# Patient Record
Sex: FEMALE | Race: WHITE | NOT HISPANIC OR LATINO | Employment: OTHER | URBAN - METROPOLITAN AREA
[De-identification: names, ages, dates, MRNs, and addresses within clinical notes are randomized per-mention and may not be internally consistent; named-entity substitution may affect disease eponyms.]

---

## 2022-09-02 ENCOUNTER — LAB (OUTPATIENT)
Dept: OTHER | Facility: HOSPITAL | Age: 62
End: 2022-09-02

## 2022-09-02 ENCOUNTER — CONSULT (OUTPATIENT)
Dept: ONCOLOGY | Facility: CLINIC | Age: 62
End: 2022-09-02

## 2022-09-02 VITALS
WEIGHT: 137.7 LBS | OXYGEN SATURATION: 95 % | BODY MASS INDEX: 27.03 KG/M2 | TEMPERATURE: 97.1 F | HEART RATE: 98 BPM | HEIGHT: 60 IN | DIASTOLIC BLOOD PRESSURE: 79 MMHG | SYSTOLIC BLOOD PRESSURE: 122 MMHG | RESPIRATION RATE: 16 BRPM

## 2022-09-02 DIAGNOSIS — D80.1 HYPOGAMMAGLOBULINEMIA, ACQUIRED: Primary | ICD-10-CM

## 2022-09-02 DIAGNOSIS — D64.9 ANEMIA, UNSPECIFIED TYPE: ICD-10-CM

## 2022-09-02 DIAGNOSIS — D64.9 ANEMIA, UNSPECIFIED TYPE: Primary | ICD-10-CM

## 2022-09-02 PROBLEM — R89.9 ABNORMAL LABORATORY TEST RESULT: Status: ACTIVE | Noted: 2022-09-02

## 2022-09-02 LAB
ALBUMIN SERPL-MCNC: 3.9 G/DL (ref 3.5–5.2)
ALBUMIN/GLOB SERPL: 1.3 G/DL
ALP SERPL-CCNC: 101 U/L (ref 39–117)
ALT SERPL W P-5'-P-CCNC: 19 U/L (ref 1–33)
ANION GAP SERPL CALCULATED.3IONS-SCNC: 8.8 MMOL/L (ref 5–15)
AST SERPL-CCNC: 28 U/L (ref 1–32)
BASOPHILS # BLD AUTO: 0.1 10*3/MM3 (ref 0–0.2)
BASOPHILS NFR BLD AUTO: 0.7 % (ref 0–1.5)
BILIRUB SERPL-MCNC: <0.2 MG/DL (ref 0–1.2)
BUN SERPL-MCNC: 15 MG/DL (ref 8–23)
BUN/CREAT SERPL: 22.4 (ref 7–25)
CALCIUM SPEC-SCNC: 9.3 MG/DL (ref 8.6–10.5)
CHLORIDE SERPL-SCNC: 94 MMOL/L (ref 98–107)
CHROMATIN AB SERPL-ACNC: <10 IU/ML (ref 0–14)
CO2 SERPL-SCNC: 32.2 MMOL/L (ref 22–29)
CREAT SERPL-MCNC: 0.67 MG/DL (ref 0.57–1)
CRP SERPL-MCNC: 1.3 MG/DL (ref 0–0.5)
DEPRECATED RDW RBC AUTO: 36.2 FL (ref 37–54)
EGFRCR SERPLBLD CKD-EPI 2021: 98.4 ML/MIN/1.73
EOSINOPHIL # BLD AUTO: 0.4 10*3/MM3 (ref 0–0.4)
EOSINOPHIL NFR BLD AUTO: 2.7 % (ref 0.3–6.2)
ERYTHROCYTE [DISTWIDTH] IN BLOOD BY AUTOMATED COUNT: 11.4 % (ref 12.3–15.4)
ERYTHROCYTE [SEDIMENTATION RATE] IN BLOOD: 32 MM/HR (ref 0–30)
FERRITIN SERPL-MCNC: 54.5 NG/ML (ref 13–150)
FOLATE SERPL-MCNC: >20 NG/ML (ref 4.78–24.2)
GLOBULIN UR ELPH-MCNC: 3.1 GM/DL
GLUCOSE SERPL-MCNC: 103 MG/DL (ref 65–99)
HCT VFR BLD AUTO: 33.3 % (ref 34–46.6)
HGB BLD-MCNC: 11.4 G/DL (ref 12–15.9)
HGB RETIC QN AUTO: 29.7 PG (ref 29.8–36.1)
IMM GRANULOCYTES # BLD AUTO: 0.08 10*3/MM3 (ref 0–0.05)
IMM GRANULOCYTES NFR BLD AUTO: 0.5 % (ref 0–0.5)
IMM RETICS NFR: 20.8 % (ref 3–15.8)
IRON 24H UR-MRATE: 170 MCG/DL (ref 37–145)
IRON SATN MFR SERPL: 44 % (ref 20–50)
LDH SERPL-CCNC: 202 U/L (ref 135–214)
LYMPHOCYTES # BLD AUTO: 2.29 10*3/MM3 (ref 0.7–3.1)
LYMPHOCYTES NFR BLD AUTO: 15.5 % (ref 19.6–45.3)
MCH RBC QN AUTO: 29.5 PG (ref 26.6–33)
MCHC RBC AUTO-ENTMCNC: 34.2 G/DL (ref 31.5–35.7)
MCV RBC AUTO: 86.3 FL (ref 79–97)
MONOCYTES # BLD AUTO: 0.8 10*3/MM3 (ref 0.1–0.9)
MONOCYTES NFR BLD AUTO: 5.4 % (ref 5–12)
NEUTROPHILS NFR BLD AUTO: 11.1 10*3/MM3 (ref 1.7–7)
NEUTROPHILS NFR BLD AUTO: 75.2 % (ref 42.7–76)
NRBC BLD AUTO-RTO: 0 /100 WBC (ref 0–0.2)
PLATELET # BLD AUTO: 434 10*3/MM3 (ref 140–450)
PMV BLD AUTO: 10.1 FL (ref 6–12)
POTASSIUM SERPL-SCNC: 3.7 MMOL/L (ref 3.5–5.2)
PROT SERPL-MCNC: 7 G/DL (ref 6–8.5)
RBC # BLD AUTO: 3.86 10*6/MM3 (ref 3.77–5.28)
RETICS # AUTO: 0.07 10*6/MM3 (ref 0.02–0.13)
RETICS/RBC NFR AUTO: 1.77 % (ref 0.7–1.9)
SODIUM SERPL-SCNC: 135 MMOL/L (ref 136–145)
TIBC SERPL-MCNC: 389 MCG/DL (ref 298–536)
TRANSFERRIN SERPL-MCNC: 261 MG/DL (ref 200–360)
URATE SERPL-MCNC: 5.2 MG/DL (ref 2.4–5.7)
VIT B12 BLD-MCNC: 1931 PG/ML (ref 211–946)
WBC NRBC COR # BLD: 14.77 10*3/MM3 (ref 3.4–10.8)

## 2022-09-02 PROCEDURE — 83521 IG LIGHT CHAINS FREE EACH: CPT | Performed by: INTERNAL MEDICINE

## 2022-09-02 PROCEDURE — 82607 VITAMIN B-12: CPT | Performed by: INTERNAL MEDICINE

## 2022-09-02 PROCEDURE — 86038 ANTINUCLEAR ANTIBODIES: CPT | Performed by: INTERNAL MEDICINE

## 2022-09-02 PROCEDURE — 82728 ASSAY OF FERRITIN: CPT | Performed by: INTERNAL MEDICINE

## 2022-09-02 PROCEDURE — 36415 COLL VENOUS BLD VENIPUNCTURE: CPT

## 2022-09-02 PROCEDURE — 82784 ASSAY IGA/IGD/IGG/IGM EACH: CPT | Performed by: INTERNAL MEDICINE

## 2022-09-02 PROCEDURE — 80053 COMPREHEN METABOLIC PANEL: CPT | Performed by: INTERNAL MEDICINE

## 2022-09-02 PROCEDURE — 85025 COMPLETE CBC W/AUTO DIFF WBC: CPT | Performed by: INTERNAL MEDICINE

## 2022-09-02 PROCEDURE — 82746 ASSAY OF FOLIC ACID SERUM: CPT | Performed by: INTERNAL MEDICINE

## 2022-09-02 PROCEDURE — 84165 PROTEIN E-PHORESIS SERUM: CPT | Performed by: INTERNAL MEDICINE

## 2022-09-02 PROCEDURE — 86334 IMMUNOFIX E-PHORESIS SERUM: CPT | Performed by: INTERNAL MEDICINE

## 2022-09-02 PROCEDURE — 86140 C-REACTIVE PROTEIN: CPT | Performed by: INTERNAL MEDICINE

## 2022-09-02 PROCEDURE — 84550 ASSAY OF BLOOD/URIC ACID: CPT | Performed by: INTERNAL MEDICINE

## 2022-09-02 PROCEDURE — 85652 RBC SED RATE AUTOMATED: CPT | Performed by: INTERNAL MEDICINE

## 2022-09-02 PROCEDURE — 99204 OFFICE O/P NEW MOD 45 MIN: CPT | Performed by: INTERNAL MEDICINE

## 2022-09-02 PROCEDURE — 85046 RETICYTE/HGB CONCENTRATE: CPT | Performed by: INTERNAL MEDICINE

## 2022-09-02 PROCEDURE — 83615 LACTATE (LD) (LDH) ENZYME: CPT | Performed by: INTERNAL MEDICINE

## 2022-09-02 PROCEDURE — 86431 RHEUMATOID FACTOR QUANT: CPT | Performed by: INTERNAL MEDICINE

## 2022-09-02 PROCEDURE — 84466 ASSAY OF TRANSFERRIN: CPT | Performed by: INTERNAL MEDICINE

## 2022-09-02 PROCEDURE — 83540 ASSAY OF IRON: CPT | Performed by: INTERNAL MEDICINE

## 2022-09-02 RX ORDER — CELECOXIB 200 MG/1
2 CAPSULE ORAL 2 TIMES DAILY
COMMUNITY
Start: 2022-08-30

## 2022-09-02 RX ORDER — POTASSIUM CHLORIDE 20 MEQ/1
1 TABLET, EXTENDED RELEASE ORAL 2 TIMES DAILY
COMMUNITY
Start: 2022-08-30

## 2022-09-02 RX ORDER — DULOXETIN HYDROCHLORIDE 30 MG/1
CAPSULE, DELAYED RELEASE ORAL
COMMUNITY
Start: 2012-05-30 | End: 2022-12-02

## 2022-09-02 RX ORDER — DOXYCYCLINE HYCLATE 50 MG/1
324 CAPSULE, GELATIN COATED ORAL DAILY
COMMUNITY
Start: 2022-05-18

## 2022-09-02 RX ORDER — DULOXETIN HYDROCHLORIDE 60 MG/1
1 CAPSULE, DELAYED RELEASE ORAL 2 TIMES DAILY
COMMUNITY
Start: 2022-06-28

## 2022-09-02 RX ORDER — FUROSEMIDE 40 MG/1
1 TABLET ORAL 2 TIMES DAILY
COMMUNITY
Start: 2022-06-28

## 2022-09-02 RX ORDER — DOXEPIN HYDROCHLORIDE 25 MG/1
1 CAPSULE ORAL NIGHTLY
COMMUNITY
Start: 2022-06-22

## 2022-09-02 RX ORDER — SACCHAROMYCES BOULARDII 250 MG
250 CAPSULE ORAL
COMMUNITY
Start: 2022-08-15

## 2022-09-02 RX ORDER — ARIPIPRAZOLE 5 MG/1
TABLET ORAL
COMMUNITY
Start: 2022-08-09

## 2022-09-02 RX ORDER — FERROUS SULFATE 325(65) MG
325 TABLET ORAL
Qty: 60 TABLET | Refills: 3 | Status: SHIPPED | OUTPATIENT
Start: 2022-09-02 | End: 2022-12-02

## 2022-09-02 RX ORDER — LEVOTHYROXINE SODIUM 25 UG/1
CAPSULE ORAL
COMMUNITY
Start: 2022-08-06

## 2022-09-02 RX ORDER — OXYCODONE HYDROCHLORIDE 30 MG/1
TABLET ORAL
COMMUNITY
End: 2022-12-02

## 2022-09-02 RX ORDER — DIPHENOXYLATE HYDROCHLORIDE AND ATROPINE SULFATE 2.5; .025 MG/1; MG/1
TABLET ORAL
COMMUNITY

## 2022-09-02 RX ORDER — FERROUS SULFATE 325(65) MG
325 TABLET ORAL
Qty: 30 TABLET | Refills: 3 | Status: SHIPPED | OUTPATIENT
Start: 2022-09-02 | End: 2022-09-02

## 2022-09-02 RX ORDER — ONDANSETRON 4 MG/1
TABLET, FILM COATED ORAL
COMMUNITY
Start: 2022-08-25

## 2022-09-02 RX ORDER — OXYCODONE AND ACETAMINOPHEN 10; 325 MG/1; MG/1
TABLET ORAL
COMMUNITY
Start: 2022-08-26

## 2022-09-02 NOTE — PROGRESS NOTES
Subjective     REASON FOR CONSULTATION:   Hypogammaglobulinemia   Iron deficiency anemia vs anemia of chronic disease  Aberrant T-cell population with absent CD7  Leukocytosis  Provide an opinion on any further workup or treatment                             REQUESTING PHYSICIAN: Contreras Fontanez MD    RECORDS OBTAINED:  Records of the patients history including those obtained from the referring provider were reviewed and summarized in detail.    HISTORY OF PRESENT ILLNESS:  The patient is a 61 y.o. year old female who is here for an opinion about the above issue.  She is referred to us from her allergy/immunology office due to a finding of hypogammaglobulinemia and iron deficiency.  She has been experiencing recurring episodes of bronchitis/pneumonia and is also being followed by Dr. Julian Funez of pulmonary medicine.    As part of her immunology work-up she had T and B cell immunophenotyping performed which showed no obvious monoclonal process but did show an aberrant T-cell population with absent CD7.  The pathologist commented that this could be seen with reactive or neoplastic processes.    She also was noted to be anemic with iron studies that were somewhat ambiguous with normal ferritin level but a decreased iron saturation and normal TIBC.  It is unclear at this time whether this is most consistent with iron deficiency or anemia of chronic disease.    Her blood count in the office today shows an elevated white count with a preponderance of mature neutrophils and normal absolute lymphocytes.  Her hemoglobin was slightly better at 11.4 g/dL compared to a hemoglobin of 10.5 with elevated platelets of 563,000 on her labs from 8/15/2022.  Platelet count today is within normal limits at 434,000.      She tells me that she has been taking an iron supplement once daily which may explain the improvement in her hemoglobin and platelet count.    She tells me that she has had chronic back problems and has had 19 separate  back related surgeries.  After the most recent surgery she coded and had to be resuscitated.  She reports that most of her health issues have developed since that episode.  History of Present Illness     Past Medical History:   Diagnosis Date   • Bone infection (HCC)    • DDD (degenerative disc disease), lumbar    • Depression    • Disease of thyroid gland    • H/O transfusion of packed red blood cells    • Hypertension    • Neuropathy         Past Surgical History:   Procedure Laterality Date   • APPENDECTOMY     • BREAST SURGERY     • BUNIONECTOMY  2017   • CATARACT EXTRACTION Bilateral 2014   • CHOLECYSTECTOMY     • COLONOSCOPY  2011   • DILATATION AND CURETTAGE     • EYE SURGERY     • HAMMER TOE REPAIR     • LUMBAR SPINE SURGERY     • NECK SURGERY  2021    decompression fusion C3-C4, C6-C7   • THYROID SURGERY     • TUBAL ABDOMINAL LIGATION          Current Outpatient Medications on File Prior to Visit   Medication Sig Dispense Refill   • ARIPiprazole (ABILIFY) 5 MG tablet TAKE 1 TABLET BY MOUTH DAILY IN THE MORNING     • calcium citrate-vitamin d (CALCITRATE) 315-250 MG-UNIT tablet tablet Take  by mouth.     • celecoxib (CeleBREX) 200 MG capsule Take 2 capsules by mouth 2 (Two) Times a Day.     • cyclobenzaprine (FLEXERIL) 10 MG tablet Take 10 mg by mouth 3 (Three) Times a Day As Needed for Muscle Spasms.     • doxepin (SINEquan) 25 MG capsule Take 1 capsule by mouth Every Night.     • DULoxetine (CYMBALTA) 30 MG capsule Take  by mouth.     • DULoxetine (CYMBALTA) 60 MG capsule Take  by mouth.     • DULoxetine (CYMBALTA) 60 MG capsule Take 1 capsule by mouth 2 (Two) Times a Day.     • ferrous gluconate (FERGON) 324 MG tablet Take 324 mg by mouth Daily.     • furosemide (LASIX) 20 MG tablet Take 20 mg by mouth 2 (Two) Times a Day.     • furosemide (LASIX) 40 MG tablet Take 1 tablet by mouth 2 (Two) Times a Day.     • gabapentin (NEURONTIN) 800 MG tablet Take  by mouth.     • hydrochlorothiazide (HYDRODIURIL) 25  MG tablet Take 25 mg by mouth Daily.     • levothyroxine sodium (TIROSINT) 137 MCG capsule Take 137 mcg by mouth Daily.     • liothyronine (CYTOMEL) 5 MCG tablet Take 5 mcg by mouth Daily.     • methadone (DOLOPHINE) 10 MG tablet Take 10 mg by mouth Every 6 (Six) Hours As Needed for Moderate Pain ,     • multivitamin (THERAGRAN) tablet tablet Take  by mouth.     • ondansetron (ZOFRAN) 4 MG tablet TAKE 1 TABLET BY MOUTH EVERY 12 HOURS AS NEEDED FOR NAUSEA     • oxyCODONE (ROXICODONE) 30 MG immediate release tablet Take  by mouth.     • oxyCODONE-acetaminophen (PERCOCET)  MG per tablet      • potassium chloride (K-DUR,KLOR-CON) 20 MEQ CR tablet Take 1 tablet by mouth 2 (Two) Times a Day.     • potassium chloride (KLOR-CON) 20 MEQ packet Take 20 mEq by mouth 2 (Two) Times a Day.     • saccharomyces boulardii (FLORASTOR) 250 MG capsule Take 250 mg by mouth.     • Tirosint 25 MCG capsule      • Vortioxetine HBr (TRINTELLIX PO) Take  by mouth.     • zolpidem (AMBIEN) 10 MG tablet Take 10 mg by mouth At Night As Needed for Sleep.       No current facility-administered medications on file prior to visit.        ALLERGIES:    Allergies   Allergen Reactions   • Diclofenac Swelling   • Adhesive Tape Rash     blisters   • Baclofen Other (See Comments)     Makes me crazy, jitters when mixed with other pain medications    • Meperidine Hives        Social History     Socioeconomic History   • Marital status:      Spouse name: J Luis   Tobacco Use   • Smoking status: Never Smoker   • Smokeless tobacco: Never Used   Substance and Sexual Activity   • Alcohol use: No   • Drug use: No   • Sexual activity: Defer        Family History   Problem Relation Age of Onset   • Hypertension Mother    • Heart disease Mother    • Heart failure Mother    • Stroke Father    • No Known Problems Sister    • Heart failure Brother    • No Known Problems Daughter    • No Known Problems Son    • Breast cancer Maternal Grandmother    • No  "Known Problems Maternal Grandfather    • No Known Problems Paternal Grandmother    • Breast cancer Paternal Grandfather    • No Known Problems Cousin         Review of Systems   Constitutional: Positive for activity change and fatigue. Negative for chills and fever.   HENT: Negative for mouth sores, trouble swallowing and voice change.    Eyes: Negative for pain and visual disturbance.   Respiratory: Negative for cough, shortness of breath and wheezing.    Cardiovascular: Negative for chest pain and palpitations.   Gastrointestinal: Positive for nausea. Negative for abdominal pain, constipation, diarrhea and vomiting.        She tells me she was recently diagnosed with gastroparesis   Genitourinary: Negative for difficulty urinating, frequency and urgency.   Musculoskeletal: Positive for arthralgias, back pain, gait problem, joint swelling and neck stiffness.   Skin: Negative for rash.   Neurological: Negative for dizziness, seizures, weakness and headaches.   Hematological: Negative for adenopathy. Does not bruise/bleed easily.   Psychiatric/Behavioral: Negative for behavioral problems and confusion. The patient is not nervous/anxious.         Objective     Vitals:    09/02/22 1038   BP: 122/79   Pulse: 98   Resp: 16   Temp: 97.1 °F (36.2 °C)   TempSrc: Temporal   SpO2: 95%   Weight: 62.5 kg (137 lb 11.2 oz)   Height: 152.4 cm (60\")   PainSc:   6   PainLoc: Back  Comment: back/ neck/ knee/ abdomen PAIN     Current Status 9/2/2022   ECOG score 0       Physical Exam  Constitutional:       General: She is not in acute distress.     Appearance: She is well-developed.   HENT:      Head: Normocephalic.   Eyes:      General: No scleral icterus.     Conjunctiva/sclera: Conjunctivae normal.      Pupils: Pupils are equal, round, and reactive to light.   Neck:      Thyroid: No thyromegaly.      Vascular: No JVD.   Cardiovascular:      Rate and Rhythm: Normal rate and regular rhythm.      Heart sounds: No murmur heard.    No " friction rub. No gallop.   Pulmonary:      Effort: Pulmonary effort is normal.      Breath sounds: Normal breath sounds. No wheezing or rales.   Abdominal:      General: There is no distension.      Palpations: Abdomen is soft. There is no mass.      Tenderness: There is no abdominal tenderness.   Musculoskeletal:         General: Swelling and deformity present. Normal range of motion.      Cervical back: Normal range of motion and neck supple.      Comments: She has visible swelling and redness of the metacarpal joints bilaterally.  She is chronically stooped over due to her severe back problems.  She is ambulating without a cane or walker.   Lymphadenopathy:      Cervical: No cervical adenopathy.   Skin:     General: Skin is warm and dry.      Findings: No erythema or rash.   Neurological:      Mental Status: She is alert and oriented to person, place, and time.      Cranial Nerves: No cranial nerve deficit.      Deep Tendon Reflexes: Reflexes are normal and symmetric.   Psychiatric:         Behavior: Behavior normal.         Judgment: Judgment normal.           RECENT LABS:  Hematology WBC   Date Value Ref Range Status   09/02/2022 14.77 (H) 3.40 - 10.80 10*3/mm3 Final   05/27/2021 5.04 4.5 - 11.0 10*3/uL Final     RBC   Date Value Ref Range Status   09/02/2022 3.86 3.77 - 5.28 10*6/mm3 Final   05/27/2021 4.10 4.0 - 5.2 10*6/uL Final     Hemoglobin   Date Value Ref Range Status   09/02/2022 11.4 (L) 12.0 - 15.9 g/dL Final   05/27/2021 12.5 12.0 - 16.0 g/dL Final     Hematocrit   Date Value Ref Range Status   09/02/2022 33.3 (L) 34.0 - 46.6 % Final   05/27/2021 34.2 (L) 36.0 - 46.0 % Final     Platelets   Date Value Ref Range Status   09/02/2022 434 140 - 450 10*3/mm3 Final   05/27/2021 358 140 - 440 10*3/uL Final        Iron   37 - 147 ug/dL 45    Unsaturated Binding Capacity   112 - 347 ug/dL 319    TIBC   265 - 497 ug/dL 364    SAT  12%    CT SCAN OF THE CHEST WITHOUT CONTRAST  7/7/2022  CONCLUSION:     1.  Worsening subtotal middle lobe atelectasis.   2. Development of mild multifocal bilateral lower lobe reticulonodular pulmonary infiltrate.   3. Small but increasing pericardial thickening/effusion.   4. Chronic changes of the chest are stable including the right upper lobe lung nodule.   5. Postoperative thoracic spine.       Assessment & Plan   1.  Hypogammaglobulinemia with recurring pulmonary infections.  2.  Anemia with somewhat ambiguous iron studies.  Her hemoglobin does seem to be improving with once daily iron supplementation.  3.  Aberrant population of CD7 negative T cells noted on T and B cell analysis.  There is no evidence of monoclonal B-cell population.    Recommendations  1.  I reassured the patient that I suspect this is a benign reactive process but we will pursue additional lab studies to shed further light on it.  2.  Additional labs will be drawn from the office today including repeat iron panel and ferritin, reticulocyte count, serum protein electrophoresis with immunofixation and free serum light chains, serum chemistries with LDH and uric acid, TONG and rheumatoid factor.  We also will check erythrocyte sedimentation rate and C-reactive protein as markers of inflammation.  3.  We we will asked patient to increase her iron supplement to twice daily with ferrous sulfate 325 mg daily with breakfast and dinner.  4.  We will have the patient return to our office in 4 weeks for follow-up with repeat CBC at that time.    Thanks for allowing us to see this nice patient in consultation.

## 2022-09-06 LAB
ALBUMIN SERPL ELPH-MCNC: 3.5 G/DL (ref 2.9–4.4)
ALBUMIN/GLOB SERPL: 1.2 {RATIO} (ref 0.7–1.7)
ALPHA1 GLOB SERPL ELPH-MCNC: 0.4 G/DL (ref 0–0.4)
ALPHA2 GLOB SERPL ELPH-MCNC: 1 G/DL (ref 0.4–1)
ANA SER QL: POSITIVE
B-GLOBULIN SERPL ELPH-MCNC: 1 G/DL (ref 0.7–1.3)
GAMMA GLOB SERPL ELPH-MCNC: 0.7 G/DL (ref 0.4–1.8)
GLOBULIN SER-MCNC: 3.1 G/DL (ref 2.2–3.9)
IGA SERPL-MCNC: 226 MG/DL (ref 87–352)
IGG SERPL-MCNC: 636 MG/DL (ref 586–1602)
IGM SERPL-MCNC: 68 MG/DL (ref 26–217)
INTERPRETATION SERPL IEP-IMP: ABNORMAL
KAPPA LC FREE SER-MCNC: 20 MG/L (ref 3.3–19.4)
KAPPA LC FREE/LAMBDA FREE SER: 1.4 {RATIO} (ref 0.26–1.65)
LABORATORY COMMENT REPORT: ABNORMAL
LAMBDA LC FREE SERPL-MCNC: 14.3 MG/L (ref 5.7–26.3)
M PROTEIN SERPL ELPH-MCNC: ABNORMAL G/DL
PROT SERPL-MCNC: 6.6 G/DL (ref 6–8.5)

## 2022-09-30 ENCOUNTER — OFFICE VISIT (OUTPATIENT)
Dept: ONCOLOGY | Facility: CLINIC | Age: 62
End: 2022-09-30

## 2022-09-30 ENCOUNTER — LAB (OUTPATIENT)
Dept: OTHER | Facility: HOSPITAL | Age: 62
End: 2022-09-30

## 2022-09-30 VITALS
OXYGEN SATURATION: 98 % | RESPIRATION RATE: 20 BRPM | WEIGHT: 139 LBS | DIASTOLIC BLOOD PRESSURE: 88 MMHG | BODY MASS INDEX: 27.29 KG/M2 | HEART RATE: 101 BPM | SYSTOLIC BLOOD PRESSURE: 160 MMHG | TEMPERATURE: 98.4 F | HEIGHT: 60 IN

## 2022-09-30 DIAGNOSIS — D64.9 ANEMIA, UNSPECIFIED TYPE: ICD-10-CM

## 2022-09-30 DIAGNOSIS — D80.1 HYPOGAMMAGLOBULINEMIA, ACQUIRED: Primary | ICD-10-CM

## 2022-09-30 LAB
BASOPHILS # BLD AUTO: 0.05 10*3/MM3 (ref 0–0.2)
BASOPHILS NFR BLD AUTO: 0.3 % (ref 0–1.5)
DEPRECATED RDW RBC AUTO: 36.9 FL (ref 37–54)
EOSINOPHIL # BLD AUTO: 0.01 10*3/MM3 (ref 0–0.4)
EOSINOPHIL NFR BLD AUTO: 0.1 % (ref 0.3–6.2)
ERYTHROCYTE [DISTWIDTH] IN BLOOD BY AUTOMATED COUNT: 12.1 % (ref 12.3–15.4)
HCT VFR BLD AUTO: 37.1 % (ref 34–46.6)
HGB BLD-MCNC: 12.6 G/DL (ref 12–15.9)
IMM GRANULOCYTES # BLD AUTO: 0.15 10*3/MM3 (ref 0–0.05)
IMM GRANULOCYTES NFR BLD AUTO: 0.9 % (ref 0–0.5)
LYMPHOCYTES # BLD AUTO: 2.26 10*3/MM3 (ref 0.7–3.1)
LYMPHOCYTES NFR BLD AUTO: 13.3 % (ref 19.6–45.3)
MCH RBC QN AUTO: 28.6 PG (ref 26.6–33)
MCHC RBC AUTO-ENTMCNC: 34 G/DL (ref 31.5–35.7)
MCV RBC AUTO: 84.1 FL (ref 79–97)
MONOCYTES # BLD AUTO: 0.7 10*3/MM3 (ref 0.1–0.9)
MONOCYTES NFR BLD AUTO: 4.1 % (ref 5–12)
NEUTROPHILS NFR BLD AUTO: 13.88 10*3/MM3 (ref 1.7–7)
NEUTROPHILS NFR BLD AUTO: 81.3 % (ref 42.7–76)
NRBC BLD AUTO-RTO: 0 /100 WBC (ref 0–0.2)
PLATELET # BLD AUTO: 486 10*3/MM3 (ref 140–450)
PMV BLD AUTO: 9.6 FL (ref 6–12)
RBC # BLD AUTO: 4.41 10*6/MM3 (ref 3.77–5.28)
WBC NRBC COR # BLD: 17.05 10*3/MM3 (ref 3.4–10.8)

## 2022-09-30 PROCEDURE — 36415 COLL VENOUS BLD VENIPUNCTURE: CPT

## 2022-09-30 PROCEDURE — 85025 COMPLETE CBC W/AUTO DIFF WBC: CPT | Performed by: INTERNAL MEDICINE

## 2022-09-30 PROCEDURE — 99213 OFFICE O/P EST LOW 20 MIN: CPT | Performed by: INTERNAL MEDICINE

## 2022-09-30 RX ORDER — CLONAZEPAM 0.5 MG/1
TABLET ORAL
COMMUNITY
Start: 2022-05-26

## 2022-09-30 RX ORDER — LEVOTHYROXINE SODIUM 13 UG/1
CAPSULE ORAL
COMMUNITY
Start: 2022-09-22

## 2022-09-30 NOTE — PROGRESS NOTES
Subjective     REASON FOR FOLLOW UP:   Hypogammaglobulinemia   Iron deficiency anemia vs anemia of chronic disease  Aberrant T-cell population with absent CD7  Leukocytosis    HISTORY OF PRESENT ILLNESS:  The patient is a 62 y.o. year old female who was referred to us from her allergy/immunology office due to a finding of hypogammaglobulinemia and iron deficiency.  She has been experiencing recurring episodes of bronchitis/pneumonia and is also being followed by Dr. Julian Funez of pulmonary medicine.    As part of her immunology work-up she had T and B cell immunophenotyping performed which showed no obvious monoclonal process but did show an aberrant T-cell population with absent CD7.  The pathologist commented that this could be seen with reactive or neoplastic processes.    She also was noted to be anemic with iron studies that were somewhat ambiguous with normal ferritin level but a decreased iron saturation and normal TIBC.  It is unclear at this time whether this is most consistent with iron deficiency or anemia of chronic disease.    Her blood count in the office today shows an elevated white count with a preponderance of mature neutrophils and normal absolute lymphocytes.  Her hemoglobin was slightly better at 11.4 g/dL compared to a hemoglobin of 10.5 with elevated platelets of 563,000 on her labs from 8/15/2022.  Platelet count today is within normal limits at 434,000.      She tells me that she has been taking an iron supplement once daily which may explain the improvement in her hemoglobin and platelet count.    She tells me that she has had chronic back problems and has had 19 separate back related surgeries.  After the most recent surgery she coded and had to be resuscitated.  She reports that most of her health issues have developed since that episode.    INTERVAL HISTORY:  After her initial consult visit of 9/2/2022 we asked her to increase her iron supplement to twice daily.  She also had  additional lab test performed with results as noted below.  She did have elevated markers of inflammation and a positive TONG.  We feel that her abnormal T-cell population is reactive and not malignant.    We will try to arrange referral to rheumatology as well as checking an TONG comprehensive panel.    History of Present Illness     Past Medical History:   Diagnosis Date   • Anemia    • Bone infection (HCC)    • DDD (degenerative disc disease), lumbar    • Depression    • Disease of thyroid gland    • H/O transfusion of packed red blood cells    • Hypertension    • Neuropathy         Past Surgical History:   Procedure Laterality Date   • APPENDECTOMY     • BREAST SURGERY     • BUNIONECTOMY  2017   • CATARACT EXTRACTION Bilateral 2014   • CHOLECYSTECTOMY     • COLONOSCOPY  2011   • DILATATION AND CURETTAGE     • EYE SURGERY     • HAMMER TOE REPAIR     • LUMBAR SPINE SURGERY     • NECK SURGERY  2021    decompression fusion C3-C4, C6-C7   • THYROID SURGERY     • TUBAL ABDOMINAL LIGATION          Current Outpatient Medications on File Prior to Visit   Medication Sig Dispense Refill   • ARIPiprazole (ABILIFY) 5 MG tablet TAKE 1 TABLET BY MOUTH DAILY IN THE MORNING     • celecoxib (CeleBREX) 200 MG capsule Take 2 capsules by mouth 2 (Two) Times a Day.     • clonazePAM (KlonoPIN) 0.5 MG tablet      • cyclobenzaprine (FLEXERIL) 10 MG tablet Take 10 mg by mouth 3 (Three) Times a Day As Needed for Muscle Spasms.     • doxepin (SINEquan) 25 MG capsule Take 1 capsule by mouth Every Night.     • DULoxetine (CYMBALTA) 60 MG capsule Take 1 capsule by mouth 2 (Two) Times a Day.     • ferrous gluconate (FERGON) 324 MG tablet Take 324 mg by mouth Daily.     • furosemide (LASIX) 40 MG tablet Take 1 tablet by mouth 2 (Two) Times a Day.     • gabapentin (NEURONTIN) 800 MG tablet Take  by mouth.     • hydrochlorothiazide (HYDRODIURIL) 25 MG tablet Take 25 mg by mouth Daily.     • liothyronine (CYTOMEL) 5 MCG tablet Take 5 mcg by mouth  "Daily.     • methadone (DOLOPHINE) 10 MG tablet Take 10 mg by mouth Every 6 (Six) Hours As Needed for Moderate Pain ,     • multivitamin (THERAGRAN) tablet tablet Take  by mouth.     • ondansetron (ZOFRAN) 4 MG tablet TAKE 1 TABLET BY MOUTH EVERY 12 HOURS AS NEEDED FOR NAUSEA     • oxyCODONE (ROXICODONE) 30 MG immediate release tablet Take  by mouth.     • oxyCODONE-acetaminophen (PERCOCET)  MG per tablet      • potassium chloride (K-DUR,KLOR-CON) 20 MEQ CR tablet Take 1 tablet by mouth 2 (Two) Times a Day.     • potassium chloride (KLOR-CON) 20 MEQ packet Take 20 mEq by mouth 2 (Two) Times a Day.     • saccharomyces boulardii (FLORASTOR) 250 MG capsule Take 250 mg by mouth.     • Tirosint 150 MCG capsule      • Vortioxetine HBr (TRINTELLIX PO) Take  by mouth.     • zolpidem (AMBIEN) 10 MG tablet Take 10 mg by mouth At Night As Needed for Sleep.     • calcium citrate-vitamin d (CALCITRATE) 315-250 MG-UNIT tablet tablet Take  by mouth.     • DULoxetine (CYMBALTA) 30 MG capsule Take  by mouth.     • DULoxetine (CYMBALTA) 60 MG capsule Take  by mouth.     • ferrous sulfate 325 (65 FE) MG tablet Take 1 tablet by mouth Daily With Breakfast & Dinner. 60 tablet 3   • furosemide (LASIX) 20 MG tablet Take 20 mg by mouth 2 (Two) Times a Day.     • levothyroxine sodium (TIROSINT) 137 MCG capsule Take 137 mcg by mouth Daily.     • Tirosint 25 MCG capsule        No current facility-administered medications on file prior to visit.        ALLERGIES:    Allergies   Allergen Reactions   • Diclofenac Swelling   • Losartan Other (See Comments)     \"GIVES ME THE JITTERS\"  \"GIVES ME THE JITTERS\"  <paragraph>\"GIVES ME THE JITTERS\"</paragraph>     • Other Unknown - Low Severity   • Adhesive Tape Rash     blisters   • Baclofen Other (See Comments)     Makes me crazy, jitters when mixed with other pain medications    • Meperidine Hives        Social History     Socioeconomic History   • Marital status:      Spouse name: J Luis " "  Tobacco Use   • Smoking status: Never Smoker   • Smokeless tobacco: Never Used   Substance and Sexual Activity   • Alcohol use: No   • Drug use: No   • Sexual activity: Defer        Family History   Problem Relation Age of Onset   • Hypertension Mother    • Heart disease Mother    • Heart failure Mother    • Stroke Father    • No Known Problems Sister    • Heart failure Brother    • No Known Problems Daughter    • No Known Problems Son    • Breast cancer Maternal Grandmother    • No Known Problems Maternal Grandfather    • No Known Problems Paternal Grandmother    • Breast cancer Paternal Grandfather    • No Known Problems Cousin         Review of Systems   Constitutional: Positive for activity change and fatigue. Negative for chills and fever.   HENT: Negative for mouth sores, trouble swallowing and voice change.    Eyes: Negative for pain and visual disturbance.   Respiratory: Negative for cough, shortness of breath and wheezing.    Cardiovascular: Negative for chest pain and palpitations.   Gastrointestinal: Positive for nausea. Negative for abdominal pain, constipation, diarrhea and vomiting.        She tells me she was recently diagnosed with gastroparesis   Genitourinary: Negative for difficulty urinating, frequency and urgency.   Musculoskeletal: Positive for arthralgias, back pain, gait problem, joint swelling and neck stiffness.   Skin: Negative for rash.   Neurological: Negative for dizziness, seizures, weakness and headaches.   Hematological: Negative for adenopathy. Does not bruise/bleed easily.   Psychiatric/Behavioral: Negative for behavioral problems and confusion. The patient is not nervous/anxious.         Objective     Vitals:    09/30/22 1515   BP: 160/88   Pulse: 101   Resp: 20   Temp: 98.4 °F (36.9 °C)   TempSrc: Temporal   SpO2: 98%   Weight: 63 kg (139 lb)  Comment: per patient   Height: 152.4 cm (60\")   PainSc:   7   PainLoc: Generalized  Comment: all over body pain, neck, back, belly "     Current Status 9/30/2022   ECOG score 1       Physical Exam  Constitutional:       General: She is not in acute distress.     Appearance: She is well-developed.   HENT:      Head: Normocephalic.   Eyes:      General: No scleral icterus.     Conjunctiva/sclera: Conjunctivae normal.      Pupils: Pupils are equal, round, and reactive to light.   Neck:      Thyroid: No thyromegaly.      Vascular: No JVD.   Cardiovascular:      Rate and Rhythm: Normal rate and regular rhythm.      Heart sounds: No murmur heard.    No friction rub. No gallop.   Pulmonary:      Effort: Pulmonary effort is normal.      Breath sounds: Normal breath sounds. No wheezing or rales.   Abdominal:      General: There is no distension.      Palpations: Abdomen is soft. There is no mass.      Tenderness: There is no abdominal tenderness.   Musculoskeletal:         General: Swelling and deformity present. Normal range of motion.      Cervical back: Normal range of motion and neck supple.      Comments: She has visible swelling and redness of the metacarpal joints bilaterally.  She is chronically stooped over due to her severe back problems.  She is ambulating without a cane or walker.   Lymphadenopathy:      Cervical: No cervical adenopathy.   Skin:     General: Skin is warm and dry.      Findings: No erythema or rash.   Neurological:      Mental Status: She is alert and oriented to person, place, and time.      Cranial Nerves: No cranial nerve deficit.      Deep Tendon Reflexes: Reflexes are normal and symmetric.   Psychiatric:         Behavior: Behavior normal.         Judgment: Judgment normal.           RECENT LABS:  Hematology WBC   Date Value Ref Range Status   09/30/2022 17.05 (H) 3.40 - 10.80 10*3/mm3 Final   05/27/2021 5.04 4.5 - 11.0 10*3/uL Final     RBC   Date Value Ref Range Status   09/30/2022 4.41 3.77 - 5.28 10*6/mm3 Final   05/27/2021 4.10 4.0 - 5.2 10*6/uL Final     Hemoglobin   Date Value Ref Range Status   09/30/2022 12.6 12.0  - 15.9 g/dL Final   05/27/2021 12.5 12.0 - 16.0 g/dL Final     Hematocrit   Date Value Ref Range Status   09/30/2022 37.1 34.0 - 46.6 % Final   05/27/2021 34.2 (L) 36.0 - 46.0 % Final     Platelets   Date Value Ref Range Status   09/30/2022 486 (H) 140 - 450 10*3/mm3 Final   05/27/2021 358 140 - 440 10*3/uL Final        Iron   37 - 147 ug/dL 45    Unsaturated Binding Capacity   112 - 347 ug/dL 319    TIBC   265 - 497 ug/dL 364    SAT  12%    CT SCAN OF THE CHEST WITHOUT CONTRAST  7/7/2022  CONCLUSION:     1. Worsening subtotal middle lobe atelectasis.   2. Development of mild multifocal bilateral lower lobe reticulonodular pulmonary infiltrate.   3. Small but increasing pericardial thickening/effusion.   4. Chronic changes of the chest are stable including the right upper lobe lung nodule.   5. Postoperative thoracic spine.       Assessment & Plan   1.  Hypogammaglobulinemia with recurring pulmonary infections.  2.  Anemia with somewhat ambiguous iron studies.  Her hemoglobin does seem to be improving with once daily iron supplementation.  Since increasing her iron supplement to twice daily she has had further improvement in her hemoglobin up to 12.6 today.  3.  Aberrant population of CD7 negative T cells noted on T and B cell analysis.  There is no evidence of monoclonal B-cell population.  We feel this is a reactive T-cell population and not indicative of an underlying T-cell leukemia or lymphoma.  4.  Positive TONG and elevated markers of inflammation.    Recommendations  1.  She will continue her iron supplement twice daily for now.  2.  We have asked her to return to our office in 8 weeks with labs drawn 1 week prior to that visit to include a repeat CBC iron panel and ferritin as well as a comprehensive TONG panel.  3.  We will try to arrange referral to rheumatology  4.  We recommended screening colonoscopy and she tells me that she is seeing a gastroenterologist in October.      Thanks for allowing us to see  this nice patient in consultation.

## 2022-10-19 ENCOUNTER — TELEPHONE (OUTPATIENT)
Dept: ONCOLOGY | Facility: CLINIC | Age: 62
End: 2022-10-19

## 2022-10-19 NOTE — TELEPHONE ENCOUNTER
Calling patient to check if [facility] has reached out to the patient, no answer, DEMETRIUS, requested a call back [phone]. Will provide their contact no. [phone] as we have faxed over referral last 10/4/22     [id number] As per patient she was seen yesterday and had labs drawn and imaging ordered

## 2022-10-28 ENCOUNTER — TRANSCRIBE ORDERS (OUTPATIENT)
Dept: ADMINISTRATIVE | Facility: HOSPITAL | Age: 62
End: 2022-10-28

## 2022-10-28 ENCOUNTER — HOSPITAL ENCOUNTER (OUTPATIENT)
Dept: GENERAL RADIOLOGY | Facility: HOSPITAL | Age: 62
Discharge: HOME | End: 2022-10-28
Admitting: INTERNAL MEDICINE

## 2022-10-28 DIAGNOSIS — M79.641 PAIN IN BOTH HANDS: ICD-10-CM

## 2022-10-28 DIAGNOSIS — M79.642 PAIN IN BOTH HANDS: ICD-10-CM

## 2022-10-28 DIAGNOSIS — D80.1 COMMON VARIABLE AGAMMAGLOBULINEMIA: ICD-10-CM

## 2022-10-28 DIAGNOSIS — D80.1 COMMON VARIABLE AGAMMAGLOBULINEMIA: Primary | ICD-10-CM

## 2022-10-28 DIAGNOSIS — M06.4 INFLAMMATORY POLYARTHROPATHY: ICD-10-CM

## 2022-10-28 DIAGNOSIS — R76.0 RAISED ANTIBODY TITER: ICD-10-CM

## 2022-10-28 PROCEDURE — 73130 X-RAY EXAM OF HAND: CPT

## 2022-11-02 ENCOUNTER — TELEPHONE (OUTPATIENT)
Dept: ONCOLOGY | Facility: CLINIC | Age: 62
End: 2022-11-02

## 2022-11-02 NOTE — TELEPHONE ENCOUNTER
Caller: Paola Reid    Relationship: Self    Best call back number:[phone]    Who are you requesting to speak with (clinical staff, provider,  specific staff member): CLINICAL    What was the call regarding: PATIENT RETURNING A MISSED CALL FOR KATHY    Do you require a callback: YES

## 2022-11-23 ENCOUNTER — APPOINTMENT (OUTPATIENT)
Dept: OTHER | Facility: HOSPITAL | Age: 62
End: 2022-11-23

## 2022-11-28 ENCOUNTER — LAB (OUTPATIENT)
Dept: OTHER | Facility: HOSPITAL | Age: 62
End: 2022-11-28

## 2022-11-28 DIAGNOSIS — D80.1 HYPOGAMMAGLOBULINEMIA, ACQUIRED: ICD-10-CM

## 2022-11-28 DIAGNOSIS — D64.9 ANEMIA, UNSPECIFIED TYPE: ICD-10-CM

## 2022-11-28 LAB
BASOPHILS # BLD AUTO: 0.08 10*3/MM3 (ref 0–0.2)
BASOPHILS NFR BLD AUTO: 0.7 % (ref 0–1.5)
DEPRECATED RDW RBC AUTO: 43 FL (ref 37–54)
EOSINOPHIL # BLD AUTO: 0.06 10*3/MM3 (ref 0–0.4)
EOSINOPHIL NFR BLD AUTO: 0.5 % (ref 0.3–6.2)
ERYTHROCYTE [DISTWIDTH] IN BLOOD BY AUTOMATED COUNT: 13.7 % (ref 12.3–15.4)
FERRITIN SERPL-MCNC: 39.9 NG/ML (ref 13–150)
HCT VFR BLD AUTO: 35.3 % (ref 34–46.6)
HGB BLD-MCNC: 11.7 G/DL (ref 12–15.9)
IMM GRANULOCYTES # BLD AUTO: 0.05 10*3/MM3 (ref 0–0.05)
IMM GRANULOCYTES NFR BLD AUTO: 0.4 % (ref 0–0.5)
IRON 24H UR-MRATE: 165 MCG/DL (ref 37–145)
IRON SATN MFR SERPL: 38 % (ref 20–50)
LYMPHOCYTES # BLD AUTO: 1.21 10*3/MM3 (ref 0.7–3.1)
LYMPHOCYTES NFR BLD AUTO: 10 % (ref 19.6–45.3)
MCH RBC QN AUTO: 28.6 PG (ref 26.6–33)
MCHC RBC AUTO-ENTMCNC: 33.1 G/DL (ref 31.5–35.7)
MCV RBC AUTO: 86.3 FL (ref 79–97)
MONOCYTES # BLD AUTO: 0.25 10*3/MM3 (ref 0.1–0.9)
MONOCYTES NFR BLD AUTO: 2.1 % (ref 5–12)
NEUTROPHILS NFR BLD AUTO: 10.43 10*3/MM3 (ref 1.7–7)
NEUTROPHILS NFR BLD AUTO: 86.3 % (ref 42.7–76)
NRBC BLD AUTO-RTO: 0 /100 WBC (ref 0–0.2)
PLATELET # BLD AUTO: 381 10*3/MM3 (ref 140–450)
PMV BLD AUTO: 9.2 FL (ref 6–12)
RBC # BLD AUTO: 4.09 10*6/MM3 (ref 3.77–5.28)
TIBC SERPL-MCNC: 437 MCG/DL (ref 298–536)
TRANSFERRIN SERPL-MCNC: 293 MG/DL (ref 200–360)
WBC NRBC COR # BLD: 12.08 10*3/MM3 (ref 3.4–10.8)

## 2022-11-28 PROCEDURE — 86225 DNA ANTIBODY NATIVE: CPT | Performed by: INTERNAL MEDICINE

## 2022-11-28 PROCEDURE — 86235 NUCLEAR ANTIGEN ANTIBODY: CPT | Performed by: INTERNAL MEDICINE

## 2022-11-28 PROCEDURE — 36415 COLL VENOUS BLD VENIPUNCTURE: CPT

## 2022-11-28 PROCEDURE — 85025 COMPLETE CBC W/AUTO DIFF WBC: CPT | Performed by: INTERNAL MEDICINE

## 2022-11-28 PROCEDURE — 82728 ASSAY OF FERRITIN: CPT | Performed by: INTERNAL MEDICINE

## 2022-11-28 PROCEDURE — 83540 ASSAY OF IRON: CPT | Performed by: INTERNAL MEDICINE

## 2022-11-28 PROCEDURE — 84466 ASSAY OF TRANSFERRIN: CPT | Performed by: INTERNAL MEDICINE

## 2022-11-29 LAB
CENTROMERE B AB SER-ACNC: <0.2 AI (ref 0–0.9)
CHROMATIN AB SERPL-ACNC: <0.2 AI (ref 0–0.9)
DSDNA AB SER-ACNC: <1 IU/ML (ref 0–9)
ENA JO1 AB SER-ACNC: <0.2 AI (ref 0–0.9)
ENA RNP AB SER-ACNC: <0.2 AI (ref 0–0.9)
ENA SCL70 AB SER-ACNC: <0.2 AI (ref 0–0.9)
ENA SM AB SER-ACNC: <0.2 AI (ref 0–0.9)
ENA SS-A AB SER-ACNC: 0.4 AI (ref 0–0.9)
ENA SS-B AB SER-ACNC: <0.2 AI (ref 0–0.9)
Lab: NORMAL

## 2022-12-02 ENCOUNTER — OFFICE VISIT (OUTPATIENT)
Dept: ONCOLOGY | Facility: CLINIC | Age: 62
End: 2022-12-02

## 2022-12-02 VITALS
HEIGHT: 60 IN | DIASTOLIC BLOOD PRESSURE: 74 MMHG | OXYGEN SATURATION: 98 % | BODY MASS INDEX: 28.07 KG/M2 | TEMPERATURE: 98.7 F | WEIGHT: 143 LBS | SYSTOLIC BLOOD PRESSURE: 118 MMHG | RESPIRATION RATE: 18 BRPM | HEART RATE: 101 BPM

## 2022-12-02 DIAGNOSIS — D80.1 HYPOGAMMAGLOBULINEMIA, ACQUIRED: ICD-10-CM

## 2022-12-02 DIAGNOSIS — D64.9 ANEMIA, UNSPECIFIED TYPE: Primary | ICD-10-CM

## 2022-12-02 PROCEDURE — 99213 OFFICE O/P EST LOW 20 MIN: CPT | Performed by: INTERNAL MEDICINE

## 2022-12-02 RX ORDER — GABAPENTIN 800 MG/1
1 TABLET ORAL 3 TIMES DAILY
COMMUNITY
Start: 2022-11-07

## 2022-12-02 RX ORDER — ESOMEPRAZOLE MAGNESIUM 40 MG/1
40 CAPSULE, DELAYED RELEASE ORAL DAILY
COMMUNITY
Start: 2022-11-05 | End: 2023-11-05

## 2022-12-02 RX ORDER — PREDNISONE 1 MG/1
15 TABLET ORAL EVERY MORNING
COMMUNITY
Start: 2022-11-16

## 2022-12-02 RX ORDER — FOLIC ACID 1 MG/1
1000 TABLET ORAL DAILY
COMMUNITY
Start: 2022-11-29 | End: 2022-12-02

## 2022-12-02 RX ORDER — LIFITEGRAST 50 MG/ML
SOLUTION/ DROPS OPHTHALMIC
COMMUNITY
Start: 2022-11-01

## 2022-12-02 NOTE — PROGRESS NOTES
Subjective     REASON FOR FOLLOW UP:   Hypogammaglobulinemia   Iron deficiency anemia vs anemia of chronic disease  Aberrant T-cell population with absent CD7  Leukocytosis    HISTORY OF PRESENT ILLNESS:  The patient is a 62 y.o. year old female who was referred to us from her allergy/immunology office due to a finding of hypogammaglobulinemia and iron deficiency.  She has been experiencing recurring episodes of bronchitis/pneumonia and is also being followed by Dr. Julian Funez of pulmonary medicine.    As part of her immunology work-up she had T and B cell immunophenotyping performed which showed no obvious monoclonal process but did show an aberrant T-cell population with absent CD7.  The pathologist commented that this could be seen with reactive or neoplastic processes.    She also was noted to be anemic with iron studies that were somewhat ambiguous with normal ferritin level but a decreased iron saturation and normal TIBC.  It is unclear at this time whether this is most consistent with iron deficiency or anemia of chronic disease.    Her blood count in the office today shows an elevated white count with a preponderance of mature neutrophils and normal absolute lymphocytes.  Her hemoglobin was slightly better at 11.4 g/dL compared to a hemoglobin of 10.5 with elevated platelets of 563,000 on her labs from 8/15/2022.  Platelet count today is within normal limits at 434,000.      She tells me that she has been taking an iron supplement once daily which may explain the improvement in her hemoglobin and platelet count.    She tells me that she has had chronic back problems and has had 19 separate back related surgeries.  After the most recent surgery she coded and had to be resuscitated.  She reports that most of her health issues have developed since that episode.    INTERVAL HISTORY:  Since her last visit here, she was evaluated by rheumatology and diagnosed with rheumatoid arthritis.  She initially was given  steroid medication but now is going to be taking weekly methotrexate under the direction of Dr. Ofelia Tinajero    Her hemoglobin remains stable at 11.7 g/dL and she seems to be replete with iron at this point.    She has been seen by gastroenterology and is scheduled for colonoscopy later this month.  History of Present Illness     Past Medical History:   Diagnosis Date   • Anemia    • Bone infection (HCC)    • DDD (degenerative disc disease), lumbar    • Depression    • Disease of thyroid gland    • H/O transfusion of packed red blood cells    • Hypertension    • Neuropathy         Past Surgical History:   Procedure Laterality Date   • APPENDECTOMY     • BREAST SURGERY     • BUNIONECTOMY  2017   • CATARACT EXTRACTION Bilateral 2014   • CHOLECYSTECTOMY     • COLONOSCOPY  2011   • DILATATION AND CURETTAGE     • EYE SURGERY     • HAMMER TOE REPAIR     • LUMBAR SPINE SURGERY     • NECK SURGERY  2021    decompression fusion C3-C4, C6-C7   • THYROID SURGERY     • TUBAL ABDOMINAL LIGATION          Current Outpatient Medications on File Prior to Visit   Medication Sig Dispense Refill   • esomeprazole (nexIUM) 40 MG capsule Take 40 mg by mouth Daily.     • gabapentin (NEURONTIN) 800 MG tablet Take 1 tablet by mouth 3 (Three) Times a Day.     • ARIPiprazole (ABILIFY) 5 MG tablet TAKE 1 TABLET BY MOUTH DAILY IN THE MORNING     • calcium citrate-vitamin d (CALCITRATE) 315-250 MG-UNIT tablet tablet Take  by mouth.     • celecoxib (CeleBREX) 200 MG capsule Take 2 capsules by mouth 2 (Two) Times a Day.     • clonazePAM (KlonoPIN) 0.5 MG tablet      • cyclobenzaprine (FLEXERIL) 10 MG tablet Take 10 mg by mouth 3 (Three) Times a Day As Needed for Muscle Spasms.     • doxepin (SINEquan) 25 MG capsule Take 1 capsule by mouth Every Night.     • DULoxetine (CYMBALTA) 30 MG capsule Take  by mouth.     • DULoxetine (CYMBALTA) 60 MG capsule Take  by mouth.     • DULoxetine (CYMBALTA) 60 MG capsule Take 1 capsule by mouth 2 (Two) Times a  Day.     • ferrous gluconate (FERGON) 324 MG tablet Take 324 mg by mouth Daily.     • ferrous sulfate 325 (65 FE) MG tablet Take 1 tablet by mouth Daily With Breakfast & Dinner. 60 tablet 3   • folic acid (FOLVITE) 1 MG tablet Take 1,000 mcg by mouth Daily.     • furosemide (LASIX) 20 MG tablet Take 20 mg by mouth 2 (Two) Times a Day.     • furosemide (LASIX) 40 MG tablet Take 1 tablet by mouth 2 (Two) Times a Day.     • gabapentin (NEURONTIN) 800 MG tablet Take  by mouth.     • hydrochlorothiazide (HYDRODIURIL) 25 MG tablet Take 25 mg by mouth Daily.     • levothyroxine sodium (TIROSINT) 137 MCG capsule Take 137 mcg by mouth Daily.     • liothyronine (CYTOMEL) 5 MCG tablet Take 5 mcg by mouth Daily.     • methadone (DOLOPHINE) 10 MG tablet Take 10 mg by mouth Every 6 (Six) Hours As Needed for Moderate Pain ,     • methotrexate 2.5 MG tablet Take 20 mg by mouth 1 (One) Time Per Week.     • multivitamin (THERAGRAN) tablet tablet Take  by mouth.     • ondansetron (ZOFRAN) 4 MG tablet TAKE 1 TABLET BY MOUTH EVERY 12 HOURS AS NEEDED FOR NAUSEA     • oxyCODONE (ROXICODONE) 30 MG immediate release tablet Take  by mouth.     • oxyCODONE-acetaminophen (PERCOCET)  MG per tablet      • potassium chloride (K-DUR,KLOR-CON) 20 MEQ CR tablet Take 1 tablet by mouth 2 (Two) Times a Day.     • potassium chloride (KLOR-CON) 20 MEQ packet Take 20 mEq by mouth 2 (Two) Times a Day.     • predniSONE (DELTASONE) 5 MG tablet Take 15 mg by mouth Every Morning.     • saccharomyces boulardii (FLORASTOR) 250 MG capsule Take 250 mg by mouth.     • Tirosint 150 MCG capsule      • Tirosint 25 MCG capsule      • Vortioxetine HBr (TRINTELLIX PO) Take  by mouth.     • Xiidra 5 % ophthalmic solution      • zolpidem (AMBIEN) 10 MG tablet Take 10 mg by mouth At Night As Needed for Sleep.       No current facility-administered medications on file prior to visit.        ALLERGIES:    Allergies   Allergen Reactions   • Diclofenac Swelling   •  "Losartan Other (See Comments)     \"GIVES ME THE JITTERS\"  \"GIVES ME THE JITTERS\"  <paragraph>\"GIVES ME THE JITTERS\"</paragraph>     • Other Unknown - Low Severity   • Adhesive Tape Rash     blisters   • Baclofen Other (See Comments)     Makes me crazy, jitters when mixed with other pain medications    • Meperidine Hives        Social History     Socioeconomic History   • Marital status:      Spouse name: J Luis   Tobacco Use   • Smoking status: Never   • Smokeless tobacco: Never   Substance and Sexual Activity   • Alcohol use: No   • Drug use: No   • Sexual activity: Defer        Family History   Problem Relation Age of Onset   • Hypertension Mother    • Heart disease Mother    • Heart failure Mother    • Stroke Father    • No Known Problems Sister    • Heart failure Brother    • No Known Problems Daughter    • No Known Problems Son    • Breast cancer Maternal Grandmother    • No Known Problems Maternal Grandfather    • No Known Problems Paternal Grandmother    • Breast cancer Paternal Grandfather    • No Known Problems Cousin         Review of Systems   Constitutional: Positive for activity change and fatigue. Negative for chills and fever.   HENT: Negative for mouth sores, trouble swallowing and voice change.    Eyes: Negative for pain and visual disturbance.   Respiratory: Negative for cough, shortness of breath and wheezing.    Cardiovascular: Negative for chest pain and palpitations.   Gastrointestinal: Positive for nausea. Negative for abdominal pain, constipation, diarrhea and vomiting.        She tells me she was recently diagnosed with gastroparesis   Genitourinary: Negative for difficulty urinating, frequency and urgency.   Musculoskeletal: Positive for arthralgias, back pain, gait problem, joint swelling and neck stiffness.   Skin: Negative for rash.   Neurological: Negative for dizziness, seizures, weakness and headaches.   Hematological: Negative for adenopathy. Does not bruise/bleed easily. " "  Psychiatric/Behavioral: Negative for behavioral problems and confusion. The patient is not nervous/anxious.         Objective     Vitals:    12/02/22 1546   Temp: 98.7 °F (37.1 °C)   TempSrc: Temporal   Weight: 64.9 kg (143 lb)  Comment: per patient   Height: 152 cm (59.84\")   PainSc:   7   PainLoc: Generalized  Comment: joint pain     Current Status 12/2/2022   ECOG score 0       Physical Exam  Constitutional:       General: She is not in acute distress.     Appearance: She is well-developed.   HENT:      Head: Normocephalic.   Eyes:      General: No scleral icterus.     Conjunctiva/sclera: Conjunctivae normal.      Pupils: Pupils are equal, round, and reactive to light.   Neck:      Thyroid: No thyromegaly.      Vascular: No JVD.   Cardiovascular:      Rate and Rhythm: Normal rate and regular rhythm.      Heart sounds: No murmur heard.    No friction rub. No gallop.   Pulmonary:      Effort: Pulmonary effort is normal.      Breath sounds: Normal breath sounds. No wheezing or rales.   Abdominal:      General: There is no distension.      Palpations: Abdomen is soft. There is no mass.      Tenderness: There is no abdominal tenderness.   Musculoskeletal:         General: Swelling and deformity present. Normal range of motion.      Cervical back: Normal range of motion and neck supple.      Comments: She has visible swelling and redness of the metacarpal joints bilaterally.  She is chronically stooped over due to her severe back problems.  She is ambulating without a cane or walker.   Lymphadenopathy:      Cervical: No cervical adenopathy.   Skin:     General: Skin is warm and dry.      Findings: No erythema or rash.   Neurological:      Mental Status: She is alert and oriented to person, place, and time.      Cranial Nerves: No cranial nerve deficit.      Deep Tendon Reflexes: Reflexes are normal and symmetric.   Psychiatric:         Behavior: Behavior normal.         Judgment: Judgment normal.           RECENT " LABS:  Hematology WBC   Date Value Ref Range Status   11/28/2022 12.08 (H) 3.40 - 10.80 10*3/mm3 Final   05/27/2021 5.04 4.5 - 11.0 10*3/uL Final     RBC   Date Value Ref Range Status   11/28/2022 4.09 3.77 - 5.28 10*6/mm3 Final   05/27/2021 4.10 4.0 - 5.2 10*6/uL Final     Hemoglobin   Date Value Ref Range Status   11/28/2022 11.7 (L) 12.0 - 15.9 g/dL Final   05/27/2021 12.5 12.0 - 16.0 g/dL Final     Hematocrit   Date Value Ref Range Status   11/28/2022 35.3 34.0 - 46.6 % Final   05/27/2021 34.2 (L) 36.0 - 46.0 % Final     Platelets   Date Value Ref Range Status   11/28/2022 381 140 - 450 10*3/mm3 Final   05/27/2021 358 140 - 440 10*3/uL Final        Lab Results   Component Value Date    GLUCOSE 103 (H) 09/02/2022    BUN 15 09/02/2022    CREATININE 0.67 09/02/2022    BCR 22.4 09/02/2022    K 3.7 09/02/2022    CO2 32.2 (H) 09/02/2022    CALCIUM 9.3 09/02/2022    PROTENTOTREF 6.6 09/02/2022    ALBUMIN 3.90 09/02/2022    ALBUMIN 3.5 09/02/2022    LABIL2 1.2 09/02/2022    AST 28 09/02/2022    ALT 19 09/02/2022     Lab Results   Component Value Date    IRON 165 (H) 11/28/2022    TIBC 437 11/28/2022    FERRITIN 39.90 11/28/2022   SAT 38%    9/2/2022  TONG Direct   Negative Positive Abnormal       Lab Results   Component Value Date    CRP 1.30 (H) 09/02/2022     Lab Results   Component Value Date    SEDRATE 32 (H) 09/02/2022       IMAGING:  CT SCAN OF THE CHEST WITHOUT CONTRAST  7/7/2022  CONCLUSION:     1. Worsening subtotal middle lobe atelectasis.   2. Development of mild multifocal bilateral lower lobe reticulonodular pulmonary infiltrate.   3. Small but increasing pericardial thickening/effusion.   4. Chronic changes of the chest are stable including the right upper lobe lung nodule.   5. Postoperative thoracic spine.       Assessment & Plan   1.  Hypogammaglobulinemia with recurring pulmonary infections.  2.  Anemia with somewhat ambiguous iron studies.  Her hemoglobin does seem to be improving with once daily iron  supplementation.  3.  Aberrant population of CD7 negative T cells noted on T and B cell analysis.  There is no evidence of monoclonal B-cell population.  We feel this is a reactive T-cell population and not indicative of an underlying T-cell leukemia or lymphoma.  4.  Positive TONG and elevated markers of inflammation.  Patient has been evaluated by rheumatology and will be following up with Dr. Ofelia Tinajero.    PLAN  1.  She will continue her iron supplement once daily for now.  2.    3.  We recommended screening colonoscopy and she currently is scheduled for colonoscopy 12/13/2022.

## 2023-04-02 DIAGNOSIS — D80.1 HYPOGAMMAGLOBULINEMIA, ACQUIRED: ICD-10-CM

## 2023-04-02 DIAGNOSIS — D64.9 ANEMIA, UNSPECIFIED TYPE: ICD-10-CM

## 2023-04-03 RX ORDER — FERROUS SULFATE 325(65) MG
TABLET ORAL
Qty: 30 TABLET | Refills: 1 | Status: SHIPPED | OUTPATIENT
Start: 2023-04-03

## 2023-06-07 DIAGNOSIS — D64.9 ANEMIA, UNSPECIFIED TYPE: ICD-10-CM

## 2023-06-07 DIAGNOSIS — D80.1 HYPOGAMMAGLOBULINEMIA, ACQUIRED: ICD-10-CM

## 2023-06-07 RX ORDER — FERROUS SULFATE 325(65) MG
TABLET ORAL
Qty: 30 TABLET | Refills: 1 | OUTPATIENT
Start: 2023-06-07

## 2023-06-13 ENCOUNTER — TELEPHONE (OUTPATIENT)
Dept: ONCOLOGY | Facility: CLINIC | Age: 63
End: 2023-06-13

## 2023-06-13 NOTE — TELEPHONE ENCOUNTER
Caller: Paola Reid    Relationship: Self    Best call back number: [phone]    What is the best time to reach you: ANYTIME    Who are you requesting to speak with (clinical staff, provider, specific staff member): SCHEDULING    What was the call regarding: PT NEEDS TO R/S HER MISSED LAB AND F/U APPT - HER FERROUS SULFATE WILL BE RUNNING OUT IN 3 DAYS AND THE REFILL REQUEST WAS DENIED DUE TO HER HAVING TO CANCEL HER APPT SHE SAID.    PLEASE CALL TO R/S APPT.     Is it okay if the provider responds through MyChart: NO

## 2023-06-14 ENCOUNTER — TELEPHONE (OUTPATIENT)
Dept: ONCOLOGY | Facility: CLINIC | Age: 63
End: 2023-06-14
Payer: MEDICARE

## 2023-06-14 RX ORDER — DOXYCYCLINE HYCLATE 50 MG/1
324 CAPSULE, GELATIN COATED ORAL DAILY
Qty: 30 TABLET | Refills: 0 | Status: SHIPPED | OUTPATIENT
Start: 2023-06-14

## 2023-06-14 RX ORDER — DOXYCYCLINE HYCLATE 50 MG/1
324 CAPSULE, GELATIN COATED ORAL DAILY
Qty: 90 TABLET | OUTPATIENT
Start: 2023-06-14

## 2023-06-14 NOTE — TELEPHONE ENCOUNTER
Caller: Paola Reid    Relationship to patient: Self    Best call back number: [phone]    Patient is needing: TO CHECK ON MEDICATION REFILL FOR FERROUS SULFATE. SHE STATES THE PHARMACY DID NOT REFILL BECAUSE PT HAS NOT SEEN DOCTOR, HOWEVER, PT WILL RUN OUT BEFORE SHE COMES TO SEE US ON 6-16-23.

## 2023-06-14 NOTE — TELEPHONE ENCOUNTER
Call back to Ms. Reid to let her know prescription for 7 days would be sent to get her through to her appointment, but patient states she has appointments the next 2 weeks, and has sent a message to scheduling to reschedule.  Let her know refill for 30 days will be sent with 0 refills.Patient verbalized understanding.

## 2023-06-14 NOTE — TELEPHONE ENCOUNTER
Caller: Paola Reid    Relationship to patient: Self    Best call back number: [phone]    Patient is needing: TO R/S 6-23-23 F/U APPT TO ANY OTHER DAY THE WEEK OF 6-26-23. SHE CANNOT DO 6-30-23 THOUGH.

## 2023-06-14 NOTE — TELEPHONE ENCOUNTER
CALLED AND LEFT MESSAGE FOR PT W/ APPT DATE AND TIME- TOLD HER TO CALL US IF THIS WAS NOT A GOOD DAY TIME.  PLEASE WT TO [facility]

## 2023-06-16 ENCOUNTER — LAB (OUTPATIENT)
Dept: OTHER | Facility: HOSPITAL | Age: 63
End: 2023-06-16
Payer: MEDICARE

## 2023-06-16 DIAGNOSIS — D64.9 ANEMIA, UNSPECIFIED TYPE: ICD-10-CM

## 2023-06-16 DIAGNOSIS — D80.1 HYPOGAMMAGLOBULINEMIA, ACQUIRED: ICD-10-CM

## 2023-06-16 LAB
ALBUMIN SERPL-MCNC: 4.3 G/DL (ref 3.5–5.2)
ALBUMIN/GLOB SERPL: 1.3 G/DL
ALP SERPL-CCNC: 106 U/L (ref 39–117)
ALT SERPL W P-5'-P-CCNC: 11 U/L (ref 1–33)
ANION GAP SERPL CALCULATED.3IONS-SCNC: 16.3 MMOL/L (ref 5–15)
AST SERPL-CCNC: 21 U/L (ref 1–32)
BASOPHILS # BLD AUTO: 0.1 10*3/MM3 (ref 0–0.2)
BASOPHILS NFR BLD AUTO: 1.2 % (ref 0–1.5)
BILIRUB SERPL-MCNC: 0.3 MG/DL (ref 0–1.2)
BUN SERPL-MCNC: 29 MG/DL (ref 8–23)
BUN/CREAT SERPL: 42.6 (ref 7–25)
CALCIUM SPEC-SCNC: 9.5 MG/DL (ref 8.6–10.5)
CHLORIDE SERPL-SCNC: 91 MMOL/L (ref 98–107)
CO2 SERPL-SCNC: 27.7 MMOL/L (ref 22–29)
CREAT SERPL-MCNC: 0.68 MG/DL (ref 0.57–1)
DEPRECATED RDW RBC AUTO: 38.4 FL (ref 37–54)
EGFRCR SERPLBLD CKD-EPI 2021: 97.4 ML/MIN/1.73
EOSINOPHIL # BLD AUTO: 0.18 10*3/MM3 (ref 0–0.4)
EOSINOPHIL NFR BLD AUTO: 2.1 % (ref 0.3–6.2)
ERYTHROCYTE [DISTWIDTH] IN BLOOD BY AUTOMATED COUNT: 12 % (ref 12.3–15.4)
FERRITIN SERPL-MCNC: 163.1 NG/ML (ref 13–150)
GLOBULIN UR ELPH-MCNC: 3.3 GM/DL
GLUCOSE SERPL-MCNC: 97 MG/DL (ref 65–99)
HCT VFR BLD AUTO: 34.1 % (ref 34–46.6)
HGB BLD-MCNC: 12.1 G/DL (ref 12–15.9)
IMM GRANULOCYTES # BLD AUTO: 0.05 10*3/MM3 (ref 0–0.05)
IMM GRANULOCYTES NFR BLD AUTO: 0.6 % (ref 0–0.5)
IRON 24H UR-MRATE: 100 MCG/DL (ref 37–145)
IRON SATN MFR SERPL: 26 % (ref 20–50)
LYMPHOCYTES # BLD AUTO: 2.05 10*3/MM3 (ref 0.7–3.1)
LYMPHOCYTES NFR BLD AUTO: 24.2 % (ref 19.6–45.3)
MCH RBC QN AUTO: 31.1 PG (ref 26.6–33)
MCHC RBC AUTO-ENTMCNC: 35.5 G/DL (ref 31.5–35.7)
MCV RBC AUTO: 87.7 FL (ref 79–97)
MONOCYTES # BLD AUTO: 0.41 10*3/MM3 (ref 0.1–0.9)
MONOCYTES NFR BLD AUTO: 4.8 % (ref 5–12)
NEUTROPHILS NFR BLD AUTO: 5.69 10*3/MM3 (ref 1.7–7)
NEUTROPHILS NFR BLD AUTO: 67.1 % (ref 42.7–76)
NRBC BLD AUTO-RTO: 0 /100 WBC (ref 0–0.2)
PLATELET # BLD AUTO: 510 10*3/MM3 (ref 140–450)
PMV BLD AUTO: 10.4 FL (ref 6–12)
POTASSIUM SERPL-SCNC: 3.6 MMOL/L (ref 3.5–5.2)
PROT SERPL-MCNC: 7.6 G/DL (ref 6–8.5)
RBC # BLD AUTO: 3.89 10*6/MM3 (ref 3.77–5.28)
SODIUM SERPL-SCNC: 135 MMOL/L (ref 136–145)
TIBC SERPL-MCNC: 378 MCG/DL (ref 298–536)
TRANSFERRIN SERPL-MCNC: 254 MG/DL (ref 200–360)
WBC NRBC COR # BLD: 8.48 10*3/MM3 (ref 3.4–10.8)

## 2023-06-16 PROCEDURE — 83521 IG LIGHT CHAINS FREE EACH: CPT | Performed by: INTERNAL MEDICINE

## 2023-06-16 PROCEDURE — 84165 PROTEIN E-PHORESIS SERUM: CPT | Performed by: INTERNAL MEDICINE

## 2023-06-16 PROCEDURE — 80053 COMPREHEN METABOLIC PANEL: CPT | Performed by: INTERNAL MEDICINE

## 2023-06-16 PROCEDURE — 86334 IMMUNOFIX E-PHORESIS SERUM: CPT | Performed by: INTERNAL MEDICINE

## 2023-06-16 PROCEDURE — 36415 COLL VENOUS BLD VENIPUNCTURE: CPT

## 2023-06-16 PROCEDURE — 85025 COMPLETE CBC W/AUTO DIFF WBC: CPT | Performed by: INTERNAL MEDICINE

## 2023-06-16 PROCEDURE — 83540 ASSAY OF IRON: CPT | Performed by: INTERNAL MEDICINE

## 2023-06-16 PROCEDURE — 82784 ASSAY IGA/IGD/IGG/IGM EACH: CPT | Performed by: INTERNAL MEDICINE

## 2023-06-16 PROCEDURE — 84466 ASSAY OF TRANSFERRIN: CPT | Performed by: INTERNAL MEDICINE

## 2023-06-16 PROCEDURE — 82728 ASSAY OF FERRITIN: CPT | Performed by: INTERNAL MEDICINE

## 2023-06-19 LAB
ALBUMIN SERPL ELPH-MCNC: 4.4 G/DL (ref 2.9–4.4)
ALBUMIN/GLOB SERPL: 1.5 {RATIO} (ref 0.7–1.7)
ALPHA1 GLOB SERPL ELPH-MCNC: 0.3 G/DL (ref 0–0.4)
ALPHA2 GLOB SERPL ELPH-MCNC: 0.9 G/DL (ref 0.4–1)
B-GLOBULIN SERPL ELPH-MCNC: 1.1 G/DL (ref 0.7–1.3)
GAMMA GLOB SERPL ELPH-MCNC: 0.8 G/DL (ref 0.4–1.8)
GLOBULIN SER-MCNC: 3.1 G/DL (ref 2.2–3.9)
IGA SERPL-MCNC: 207 MG/DL (ref 87–352)
IGG SERPL-MCNC: 766 MG/DL (ref 586–1602)
IGM SERPL-MCNC: 55 MG/DL (ref 26–217)
INTERPRETATION SERPL IEP-IMP: ABNORMAL
KAPPA LC FREE SER-MCNC: 22.9 MG/L (ref 3.3–19.4)
KAPPA LC FREE/LAMBDA FREE SER: 1.73 {RATIO} (ref 0.26–1.65)
LABORATORY COMMENT REPORT: ABNORMAL
LAMBDA LC FREE SERPL-MCNC: 13.2 MG/L (ref 5.7–26.3)
M PROTEIN SERPL ELPH-MCNC: ABNORMAL G/DL
PROT SERPL-MCNC: 7.5 G/DL (ref 6–8.5)

## 2023-06-27 PROBLEM — D64.9 ANEMIA: Status: ACTIVE | Noted: 2023-06-27

## 2023-06-28 PROBLEM — D80.1 HYPOGAMMAGLOBULINEMIA: Status: ACTIVE | Noted: 2023-06-28

## 2023-07-21 PROCEDURE — 85025 COMPLETE CBC W/AUTO DIFF WBC: CPT | Performed by: INTERNAL MEDICINE

## 2023-07-21 PROCEDURE — 82784 ASSAY IGA/IGD/IGG/IGM EACH: CPT | Performed by: INTERNAL MEDICINE

## 2023-07-26 RX ORDER — DOXYCYCLINE HYCLATE 50 MG/1
324 CAPSULE, GELATIN COATED ORAL DAILY
Qty: 30 TABLET | Refills: 2 | Status: SHIPPED | OUTPATIENT
Start: 2023-07-26

## 2023-08-03 DIAGNOSIS — D80.1 HYPOGAMMAGLOBULINEMIA: Primary | ICD-10-CM

## 2023-08-04 ENCOUNTER — INFUSION (OUTPATIENT)
Dept: ONCOLOGY | Facility: HOSPITAL | Age: 63
End: 2023-08-04
Payer: MEDICARE

## 2023-08-04 VITALS
BODY MASS INDEX: 29.25 KG/M2 | TEMPERATURE: 97.1 F | DIASTOLIC BLOOD PRESSURE: 79 MMHG | HEART RATE: 73 BPM | SYSTOLIC BLOOD PRESSURE: 123 MMHG | OXYGEN SATURATION: 98 % | WEIGHT: 149 LBS | RESPIRATION RATE: 16 BRPM

## 2023-08-04 DIAGNOSIS — D80.1 HYPOGAMMAGLOBULINEMIA: Primary | ICD-10-CM

## 2023-08-04 LAB
BASOPHILS # BLD AUTO: 0.11 10*3/MM3 (ref 0–0.2)
BASOPHILS NFR BLD AUTO: 0.8 % (ref 0–1.5)
DEPRECATED RDW RBC AUTO: 44.3 FL (ref 37–54)
EOSINOPHIL # BLD AUTO: 0.36 10*3/MM3 (ref 0–0.4)
EOSINOPHIL NFR BLD AUTO: 2.7 % (ref 0.3–6.2)
ERYTHROCYTE [DISTWIDTH] IN BLOOD BY AUTOMATED COUNT: 13.7 % (ref 12.3–15.4)
HCT VFR BLD AUTO: 31.3 % (ref 34–46.6)
HGB BLD-MCNC: 11.1 G/DL (ref 12–15.9)
IGA1 MFR SER: 178 MG/DL (ref 70–400)
IGG1 SER-MCNC: 913 MG/DL (ref 700–1600)
IGM SERPL-MCNC: 54 MG/DL (ref 40–230)
IMM GRANULOCYTES # BLD AUTO: 0.16 10*3/MM3 (ref 0–0.05)
IMM GRANULOCYTES NFR BLD AUTO: 1.2 % (ref 0–0.5)
LYMPHOCYTES # BLD AUTO: 2.43 10*3/MM3 (ref 0.7–3.1)
LYMPHOCYTES NFR BLD AUTO: 18.5 % (ref 19.6–45.3)
MCH RBC QN AUTO: 31.9 PG (ref 26.6–33)
MCHC RBC AUTO-ENTMCNC: 35.5 G/DL (ref 31.5–35.7)
MCV RBC AUTO: 89.9 FL (ref 79–97)
MONOCYTES # BLD AUTO: 0.66 10*3/MM3 (ref 0.1–0.9)
MONOCYTES NFR BLD AUTO: 5 % (ref 5–12)
NEUTROPHILS NFR BLD AUTO: 71.8 % (ref 42.7–76)
NEUTROPHILS NFR BLD AUTO: 9.38 10*3/MM3 (ref 1.7–7)
NRBC BLD AUTO-RTO: 0 /100 WBC (ref 0–0.2)
PLATELET # BLD AUTO: 393 10*3/MM3 (ref 140–450)
PMV BLD AUTO: 10.1 FL (ref 6–12)
RBC # BLD AUTO: 3.48 10*6/MM3 (ref 3.77–5.28)
WBC NRBC COR # BLD: 13.1 10*3/MM3 (ref 3.4–10.8)

## 2023-08-04 PROCEDURE — 63710000001 DIPHENHYDRAMINE PER 50 MG: Performed by: INTERNAL MEDICINE

## 2023-08-04 PROCEDURE — 25010000002 IMMUNE GLOBULIN (HUMAN) 10 GM/100ML SOLUTION: Performed by: INTERNAL MEDICINE

## 2023-08-04 PROCEDURE — 85025 COMPLETE CBC W/AUTO DIFF WBC: CPT

## 2023-08-04 PROCEDURE — 96365 THER/PROPH/DIAG IV INF INIT: CPT

## 2023-08-04 PROCEDURE — 25010000002 IMMUNE GLOBULIN (HUMAN) 5 GM/50ML SOLUTION: Performed by: INTERNAL MEDICINE

## 2023-08-04 PROCEDURE — 96366 THER/PROPH/DIAG IV INF ADDON: CPT

## 2023-08-04 PROCEDURE — 82784 ASSAY IGA/IGD/IGG/IGM EACH: CPT | Performed by: INTERNAL MEDICINE

## 2023-08-04 RX ORDER — ACETAMINOPHEN 325 MG/1
650 TABLET ORAL ONCE
Status: COMPLETED | OUTPATIENT
Start: 2023-08-04 | End: 2023-08-04

## 2023-08-04 RX ORDER — SODIUM CHLORIDE 9 MG/ML
250 INJECTION, SOLUTION INTRAVENOUS ONCE
Status: COMPLETED | OUTPATIENT
Start: 2023-08-04 | End: 2023-08-04

## 2023-08-04 RX ORDER — DIPHENHYDRAMINE HCL 25 MG
25 CAPSULE ORAL ONCE
Status: COMPLETED | OUTPATIENT
Start: 2023-08-04 | End: 2023-08-04

## 2023-08-04 RX ADMIN — ACETAMINOPHEN 650 MG: 325 TABLET ORAL at 11:37

## 2023-08-04 RX ADMIN — IMMUNE GLOBULIN (HUMAN) 10 G: 10 INJECTION INTRAVENOUS; SUBCUTANEOUS at 12:01

## 2023-08-04 RX ADMIN — IMMUNE GLOBULIN (HUMAN) 5 G: 10 INJECTION INTRAVENOUS; SUBCUTANEOUS at 13:18

## 2023-08-04 RX ADMIN — DIPHENHYDRAMINE HYDROCHLORIDE 25 MG: 25 CAPSULE ORAL at 11:37

## 2023-08-04 RX ADMIN — SODIUM CHLORIDE 250 ML: 9 INJECTION, SOLUTION INTRAVENOUS at 11:40

## 2023-08-18 ENCOUNTER — INFUSION (OUTPATIENT)
Dept: ONCOLOGY | Facility: HOSPITAL | Age: 63
End: 2023-08-18
Payer: MEDICARE

## 2023-08-18 VITALS
DIASTOLIC BLOOD PRESSURE: 76 MMHG | SYSTOLIC BLOOD PRESSURE: 119 MMHG | BODY MASS INDEX: 28.11 KG/M2 | OXYGEN SATURATION: 97 % | HEART RATE: 79 BPM | RESPIRATION RATE: 20 BRPM | HEIGHT: 60 IN | TEMPERATURE: 98.2 F | WEIGHT: 143.2 LBS

## 2023-08-18 DIAGNOSIS — D80.1 HYPOGAMMAGLOBULINEMIA: Primary | ICD-10-CM

## 2023-08-18 DIAGNOSIS — I87.8 POOR VENOUS ACCESS: ICD-10-CM

## 2023-08-18 LAB
BASOPHILS # BLD AUTO: 0.1 10*3/MM3 (ref 0–0.2)
BASOPHILS NFR BLD AUTO: 1.5 % (ref 0–1.5)
DEPRECATED RDW RBC AUTO: 42.7 FL (ref 37–54)
EOSINOPHIL # BLD AUTO: 0.25 10*3/MM3 (ref 0–0.4)
EOSINOPHIL NFR BLD AUTO: 3.8 % (ref 0.3–6.2)
ERYTHROCYTE [DISTWIDTH] IN BLOOD BY AUTOMATED COUNT: 13.2 % (ref 12.3–15.4)
HCT VFR BLD AUTO: 34.5 % (ref 34–46.6)
HGB BLD-MCNC: 12.5 G/DL (ref 12–15.9)
IGA1 MFR SER: 214 MG/DL (ref 70–400)
IGG1 SER-MCNC: 1052 MG/DL (ref 700–1600)
IGM SERPL-MCNC: 61 MG/DL (ref 40–230)
IMM GRANULOCYTES # BLD AUTO: 0.01 10*3/MM3 (ref 0–0.05)
IMM GRANULOCYTES NFR BLD AUTO: 0.2 % (ref 0–0.5)
LYMPHOCYTES # BLD AUTO: 2 10*3/MM3 (ref 0.7–3.1)
LYMPHOCYTES NFR BLD AUTO: 30.3 % (ref 19.6–45.3)
MCH RBC QN AUTO: 32.6 PG (ref 26.6–33)
MCHC RBC AUTO-ENTMCNC: 36.2 G/DL (ref 31.5–35.7)
MCV RBC AUTO: 89.8 FL (ref 79–97)
MONOCYTES # BLD AUTO: 0.34 10*3/MM3 (ref 0.1–0.9)
MONOCYTES NFR BLD AUTO: 5.2 % (ref 5–12)
NEUTROPHILS NFR BLD AUTO: 3.9 10*3/MM3 (ref 1.7–7)
NEUTROPHILS NFR BLD AUTO: 59 % (ref 42.7–76)
NRBC BLD AUTO-RTO: 0 /100 WBC (ref 0–0.2)
PLATELET # BLD AUTO: 459 10*3/MM3 (ref 140–450)
PMV BLD AUTO: 10.4 FL (ref 6–12)
RBC # BLD AUTO: 3.84 10*6/MM3 (ref 3.77–5.28)
WBC NRBC COR # BLD: 6.6 10*3/MM3 (ref 3.4–10.8)

## 2023-08-18 PROCEDURE — 25010000002 IMMUNE GLOBULIN (HUMAN) 10 GM/100ML SOLUTION: Performed by: NURSE PRACTITIONER

## 2023-08-18 PROCEDURE — 82784 ASSAY IGA/IGD/IGG/IGM EACH: CPT | Performed by: NURSE PRACTITIONER

## 2023-08-18 PROCEDURE — 96365 THER/PROPH/DIAG IV INF INIT: CPT

## 2023-08-18 PROCEDURE — 25010000002 IMMUNE GLOBULIN (HUMAN) 5 GM/50ML SOLUTION: Performed by: INTERNAL MEDICINE

## 2023-08-18 PROCEDURE — 96375 TX/PRO/DX INJ NEW DRUG ADDON: CPT

## 2023-08-18 PROCEDURE — 63710000001 DIPHENHYDRAMINE PER 50 MG: Performed by: NURSE PRACTITIONER

## 2023-08-18 PROCEDURE — 96366 THER/PROPH/DIAG IV INF ADDON: CPT

## 2023-08-18 PROCEDURE — 85025 COMPLETE CBC W/AUTO DIFF WBC: CPT | Performed by: NURSE PRACTITIONER

## 2023-08-18 RX ORDER — SODIUM CHLORIDE 9 MG/ML
250 INJECTION, SOLUTION INTRAVENOUS ONCE
Status: COMPLETED | OUTPATIENT
Start: 2023-08-18 | End: 2023-08-18

## 2023-08-18 RX ORDER — DIPHENHYDRAMINE HCL 25 MG
25 CAPSULE ORAL ONCE
Status: COMPLETED | OUTPATIENT
Start: 2023-08-18 | End: 2023-08-18

## 2023-08-18 RX ORDER — IMMUNE GLOBULIN (HUMAN) 10 G/100ML
5 INJECTION INTRAVENOUS; SUBCUTANEOUS ONCE
Qty: 5 G | Refills: 0 | Status: SHIPPED | OUTPATIENT
Start: 2023-08-18 | End: 2023-08-18

## 2023-08-18 RX ORDER — FAMOTIDINE 10 MG/ML
20 INJECTION, SOLUTION INTRAVENOUS AS NEEDED
Status: COMPLETED | OUTPATIENT
Start: 2023-08-18 | End: 2023-08-18

## 2023-08-18 RX ORDER — ACETAMINOPHEN 325 MG/1
650 TABLET ORAL ONCE
Status: COMPLETED | OUTPATIENT
Start: 2023-08-18 | End: 2023-08-18

## 2023-08-18 RX ADMIN — IMMUNE GLOBULIN (HUMAN) 5 G: 10 INJECTION INTRAVENOUS; SUBCUTANEOUS at 16:23

## 2023-08-18 RX ADMIN — ACETAMINOPHEN 650 MG: 325 TABLET ORAL at 14:13

## 2023-08-18 RX ADMIN — FAMOTIDINE 20 MG: 10 INJECTION INTRAVENOUS at 16:38

## 2023-08-18 RX ADMIN — DIPHENHYDRAMINE HYDROCHLORIDE 25 MG: 25 CAPSULE ORAL at 14:13

## 2023-08-18 RX ADMIN — SODIUM CHLORIDE 250 ML: 9 INJECTION, SOLUTION INTRAVENOUS at 14:13

## 2023-08-18 RX ADMIN — IMMUNE GLOBULIN (HUMAN) 10 G: 10 INJECTION INTRAVENOUS; SUBCUTANEOUS at 14:46

## 2023-08-18 NOTE — NURSING NOTE
Patient here for IVIG infusion. Verbal order per Dr. Dominguez to continue IVIG as ordered.    1637 pt developed red rash above IV site (after 10g IVIG had infused and about 20 ml of the 5g IVIG had infused) that pt states feels sensitive. Infusion stopped. Altagracia MOSQUERA came to chair side to evaluate and received order for IV pepcid. /76; HR 80.  1638 20mg IV pepcid given.  1641 Site above IV on right wrist with no swelling but diffuse redness noted and pt c/o itching.  1644 Altagracia back at bedside to evaluate redness and itching at site. NS IVF's stopped and she states to not infuse any more of today's IVIG infusion. Altagracia to discuss options for IV access with Dr Dominguez due to pt's poor venous access.  1651 Pt states itching at IV site subsiding but still present. Redness fading at site.  1655 Altagracia spoke with pt after discussing the above with Dr Dominguez regarding Scheduling to arrange to have a port placed. Altagracia instructed that she could use hydrocortisone cream to site at home and another PO benadryl at home if needed. Pt verbalized understanding.  1709 Pt states itching at site continues to improve and IV site area not as red. Pt discharged in stable condition.

## 2023-08-21 PROBLEM — I87.8 POOR VENOUS ACCESS: Status: ACTIVE | Noted: 2023-08-21

## 2023-08-21 PROBLEM — Z45.2 ENCOUNTER FOR ADJUSTMENT OR MANAGEMENT OF VASCULAR ACCESS DEVICE: Status: ACTIVE | Noted: 2023-08-21

## 2023-08-21 RX ORDER — SODIUM CHLORIDE 0.9 % (FLUSH) 0.9 %
10 SYRINGE (ML) INJECTION AS NEEDED
OUTPATIENT
Start: 2023-08-22

## 2023-08-21 RX ORDER — HEPARIN SODIUM (PORCINE) LOCK FLUSH IV SOLN 100 UNIT/ML 100 UNIT/ML
500 SOLUTION INTRAVENOUS AS NEEDED
OUTPATIENT
Start: 2023-08-22

## 2023-08-21 NOTE — PROGRESS NOTES
08/22/23 0001   Pre-Procedure Phone Call   Procedure Time Verified Yes   Arrival Time 1200   Procedure Location Verified Yes   Medical History Reviewed No   NPO Status Reinforced Yes   Ride and Caregiver Arranged Yes   Patient Knows to Bring Current Medications Yes   Bring Outside Films Requested No

## 2023-08-22 ENCOUNTER — APPOINTMENT (OUTPATIENT)
Dept: OTHER | Facility: HOSPITAL | Age: 63
End: 2023-08-22
Payer: MEDICARE

## 2023-08-22 ENCOUNTER — HOSPITAL ENCOUNTER (OUTPATIENT)
Dept: INTERVENTIONAL RADIOLOGY/VASCULAR | Facility: HOSPITAL | Age: 63
Discharge: HOME | End: 2023-08-22
Payer: MEDICARE

## 2023-08-22 VITALS
HEIGHT: 60 IN | OXYGEN SATURATION: 97 % | BODY MASS INDEX: 27.48 KG/M2 | SYSTOLIC BLOOD PRESSURE: 97 MMHG | RESPIRATION RATE: 18 BRPM | HEART RATE: 85 BPM | WEIGHT: 140 LBS | DIASTOLIC BLOOD PRESSURE: 76 MMHG | TEMPERATURE: 98.3 F

## 2023-08-22 DIAGNOSIS — D80.1 HYPOGAMMAGLOBULINEMIA: ICD-10-CM

## 2023-08-22 DIAGNOSIS — Z09 FOLLOW-UP EXAM: ICD-10-CM

## 2023-08-22 DIAGNOSIS — I87.8 POOR VENOUS ACCESS: ICD-10-CM

## 2023-08-22 LAB
ANION GAP SERPL CALCULATED.3IONS-SCNC: 14 MMOL/L (ref 5–15)
BUN SERPL-MCNC: 33 MG/DL (ref 8–23)
BUN/CREAT SERPL: 42.3 (ref 7–25)
CALCIUM SPEC-SCNC: 8.9 MG/DL (ref 8.6–10.5)
CHLORIDE SERPL-SCNC: 97 MMOL/L (ref 98–107)
CO2 SERPL-SCNC: 29 MMOL/L (ref 22–29)
CREAT SERPL-MCNC: 0.78 MG/DL (ref 0.57–1)
EGFRCR SERPLBLD CKD-EPI 2021: 84.9 ML/MIN/1.73
GLUCOSE SERPL-MCNC: 106 MG/DL (ref 65–99)
INR PPP: 1 (ref 0.9–1.1)
INR PPP: 1.1 (ref 0.8–1.2)
PLATELET # BLD AUTO: 353 10*3/MM3 (ref 140–450)
POTASSIUM SERPL-SCNC: 3.6 MMOL/L (ref 3.5–5.2)
PROTHROMBIN TIME: 13.1 SECONDS (ref 12.8–15.2)
PROTHROMBIN TIME: 13.3 SECONDS (ref 11.7–14.2)
SODIUM SERPL-SCNC: 140 MMOL/L (ref 136–145)

## 2023-08-22 PROCEDURE — 25010000002 DIPHENHYDRAMINE PER 50 MG: Performed by: RADIOLOGY

## 2023-08-22 PROCEDURE — 25010000002 CEFAZOLIN IN DEXTROSE 2000 MG/ 100 ML SOLUTION: Performed by: RADIOLOGY

## 2023-08-22 PROCEDURE — 25010000002 HYDROMORPHONE PER 4 MG: Performed by: RADIOLOGY

## 2023-08-22 PROCEDURE — 80048 BASIC METABOLIC PNL TOTAL CA: CPT | Performed by: RADIOLOGY

## 2023-08-22 PROCEDURE — 85049 AUTOMATED PLATELET COUNT: CPT | Performed by: RADIOLOGY

## 2023-08-22 PROCEDURE — 85610 PROTHROMBIN TIME: CPT

## 2023-08-22 PROCEDURE — 25010000002 FENTANYL CITRATE (PF) 50 MCG/ML SOLUTION: Performed by: RADIOLOGY

## 2023-08-22 PROCEDURE — C1788 PORT, INDWELLING, IMP: HCPCS

## 2023-08-22 PROCEDURE — 85610 PROTHROMBIN TIME: CPT | Performed by: RADIOLOGY

## 2023-08-22 PROCEDURE — 25010000002 MIDAZOLAM PER 1 MG: Performed by: RADIOLOGY

## 2023-08-22 PROCEDURE — C1894 INTRO/SHEATH, NON-LASER: HCPCS

## 2023-08-22 RX ORDER — HYDROCODONE BITARTRATE AND ACETAMINOPHEN 5; 325 MG/1; MG/1
1 TABLET ORAL EVERY 4 HOURS PRN
COMMUNITY
Start: 2023-07-22

## 2023-08-22 RX ORDER — ALBUTEROL SULFATE 90 UG/1
2 AEROSOL, METERED RESPIRATORY (INHALATION)
COMMUNITY
Start: 2023-06-20 | End: 2024-06-19

## 2023-08-22 RX ORDER — HYDROMORPHONE HYDROCHLORIDE 1 MG/ML
INJECTION, SOLUTION INTRAMUSCULAR; INTRAVENOUS; SUBCUTANEOUS AS NEEDED
Status: COMPLETED | OUTPATIENT
Start: 2023-08-22 | End: 2023-08-22

## 2023-08-22 RX ORDER — SODIUM CHLORIDE 9 MG/ML
INJECTION, SOLUTION INTRAVENOUS CONTINUOUS PRN
Status: COMPLETED | OUTPATIENT
Start: 2023-08-22 | End: 2023-08-22

## 2023-08-22 RX ORDER — SODIUM CHLORIDE 0.9 % (FLUSH) 0.9 %
10 SYRINGE (ML) INJECTION AS NEEDED
Status: DISCONTINUED | OUTPATIENT
Start: 2023-08-22 | End: 2023-08-23 | Stop reason: HOSPADM

## 2023-08-22 RX ORDER — SODIUM CHLORIDE 0.9 % (FLUSH) 0.9 %
3 SYRINGE (ML) INJECTION EVERY 12 HOURS SCHEDULED
Status: DISCONTINUED | OUTPATIENT
Start: 2023-08-22 | End: 2023-08-23 | Stop reason: HOSPADM

## 2023-08-22 RX ORDER — DIPHENHYDRAMINE HYDROCHLORIDE 50 MG/ML
INJECTION INTRAMUSCULAR; INTRAVENOUS AS NEEDED
Status: COMPLETED | OUTPATIENT
Start: 2023-08-22 | End: 2023-08-22

## 2023-08-22 RX ORDER — FENTANYL CITRATE 50 UG/ML
INJECTION, SOLUTION INTRAMUSCULAR; INTRAVENOUS AS NEEDED
Status: COMPLETED | OUTPATIENT
Start: 2023-08-22 | End: 2023-08-22

## 2023-08-22 RX ORDER — SODIUM CHLORIDE 9 MG/ML
40 INJECTION, SOLUTION INTRAVENOUS AS NEEDED
Status: DISCONTINUED | OUTPATIENT
Start: 2023-08-22 | End: 2023-08-23 | Stop reason: HOSPADM

## 2023-08-22 RX ORDER — CEFAZOLIN SODIUM 2 G/100ML
2000 INJECTION, SOLUTION INTRAVENOUS ONCE
Status: COMPLETED | OUTPATIENT
Start: 2023-08-22 | End: 2023-08-22

## 2023-08-22 RX ORDER — LIDOCAINE HYDROCHLORIDE 10 MG/ML
INJECTION, SOLUTION EPIDURAL; INFILTRATION; INTRACAUDAL; PERINEURAL AS NEEDED
Status: COMPLETED | OUTPATIENT
Start: 2023-08-22 | End: 2023-08-22

## 2023-08-22 RX ORDER — MIDAZOLAM HYDROCHLORIDE 1 MG/ML
INJECTION INTRAMUSCULAR; INTRAVENOUS AS NEEDED
Status: COMPLETED | OUTPATIENT
Start: 2023-08-22 | End: 2023-08-22

## 2023-08-22 RX ADMIN — FENTANYL CITRATE 25 MCG: 50 INJECTION INTRAMUSCULAR; INTRAVENOUS at 14:12

## 2023-08-22 RX ADMIN — LIDOCAINE HYDROCHLORIDE 20 ML: 10 INJECTION, SOLUTION EPIDURAL; INFILTRATION; INTRACAUDAL; PERINEURAL at 14:44

## 2023-08-22 RX ADMIN — MIDAZOLAM 0.5 MG: 1 INJECTION INTRAMUSCULAR; INTRAVENOUS at 14:28

## 2023-08-22 RX ADMIN — HYDROMORPHONE HYDROCHLORIDE 0.5 MG: 1 INJECTION, SOLUTION INTRAMUSCULAR; INTRAVENOUS; SUBCUTANEOUS at 14:59

## 2023-08-22 RX ADMIN — DIPHENHYDRAMINE HYDROCHLORIDE 25 MG: 50 INJECTION, SOLUTION INTRAMUSCULAR; INTRAVENOUS at 14:20

## 2023-08-22 RX ADMIN — MIDAZOLAM 0.5 MG: 1 INJECTION INTRAMUSCULAR; INTRAVENOUS at 14:08

## 2023-08-22 RX ADMIN — MIDAZOLAM 1 MG: 1 INJECTION INTRAMUSCULAR; INTRAVENOUS at 14:06

## 2023-08-22 RX ADMIN — DIPHENHYDRAMINE HYDROCHLORIDE 25 MG: 50 INJECTION, SOLUTION INTRAMUSCULAR; INTRAVENOUS at 14:28

## 2023-08-22 RX ADMIN — FENTANYL CITRATE 50 MCG: 50 INJECTION INTRAMUSCULAR; INTRAVENOUS at 14:06

## 2023-08-22 RX ADMIN — FENTANYL CITRATE 25 MCG: 50 INJECTION INTRAMUSCULAR; INTRAVENOUS at 14:35

## 2023-08-22 RX ADMIN — Medication 3 ML: at 14:29

## 2023-08-22 RX ADMIN — MIDAZOLAM 0.5 MG: 1 INJECTION INTRAMUSCULAR; INTRAVENOUS at 14:12

## 2023-08-22 RX ADMIN — FENTANYL CITRATE 25 MCG: 50 INJECTION INTRAMUSCULAR; INTRAVENOUS at 14:08

## 2023-08-22 RX ADMIN — CEFAZOLIN SODIUM 2000 MG: 2 INJECTION, SOLUTION INTRAVENOUS at 13:52

## 2023-08-22 RX ADMIN — FENTANYL CITRATE 25 MCG: 50 INJECTION INTRAMUSCULAR; INTRAVENOUS at 14:28

## 2023-08-22 RX ADMIN — FENTANYL CITRATE 25 MCG: 50 INJECTION INTRAMUSCULAR; INTRAVENOUS at 14:20

## 2023-08-22 RX ADMIN — SODIUM CHLORIDE 20 ML/HR: 9 INJECTION, SOLUTION INTRAVENOUS at 13:49

## 2023-08-22 RX ADMIN — MIDAZOLAM 0.5 MG: 1 INJECTION INTRAMUSCULAR; INTRAVENOUS at 14:40

## 2023-08-22 RX ADMIN — MIDAZOLAM 0.5 MG: 1 INJECTION INTRAMUSCULAR; INTRAVENOUS at 14:20

## 2023-08-22 NOTE — POST-PROCEDURE NOTE
POST PROCEDURE NOTE    Procedure: port     Pre-Procedure Diagnosis: Hypogammaglobulinemia, Poor venous access    Post-procedure Diagnosis: same    Findings: successful SL port plcmt via right IJ, heplocked, ready to use    Complications: none    Blood loss: min    Specimen Removed: n/a    Disposition:   Discharge home

## 2023-08-22 NOTE — NURSING NOTE
Discharged home via private car.  Escorted to vehicle per this RN.  No acute distress noted and tolerating po well.  All belongings returned to patient prior to discharge and discharge instructions given verbally and in writing including to expect postop discharge phone call.

## 2023-08-22 NOTE — H&P
Name: Paola Reid ADMIT: 2023   : 1960  PCP: Akil Mcnally DO    MRN: [id number] LOS: 0 days   AGE/SEX: 62 y.o. female  ROOM: Room/bed info not found     Chief complaint   Patient is a 62 y.o. female presents with Hypogammaglobulinemia, Poor venous access .     History of Present Illness: Hypogammaglobulinemia, Poor venous access     Past Surgical History:  Past Surgical History:   Procedure Laterality Date    APPENDECTOMY      BREAST SURGERY      BUNIONECTOMY  2017    CATARACT EXTRACTION Bilateral 2014    CHOLECYSTECTOMY      COLONOSCOPY      DILATATION AND CURETTAGE      EYE SURGERY      HAMMER TOE REPAIR      LUMBAR SPINE SURGERY      NECK SURGERY      decompression fusion C3-C4, C6-C7    THYROID SURGERY      TUBAL ABDOMINAL LIGATION         Past Medical History:  Past Medical History:   Diagnosis Date    Anemia     Bone infection     DDD (degenerative disc disease), lumbar     Depression     Disease of thyroid gland     H/O transfusion of packed red blood cells     Hypertension     Neuropathy        Home Medications:  (Not in a hospital admission)      Allergies:  Diclofenac, Losartan, Other, Adhesive tape, Baclofen, and Meperidine    Family History:  Family History   Problem Relation Age of Onset    Hypertension Mother     Heart disease Mother     Heart failure Mother     Stroke Father     No Known Problems Sister     Heart failure Brother     No Known Problems Daughter     No Known Problems Son     Breast cancer Maternal Grandmother     No Known Problems Maternal Grandfather     No Known Problems Paternal Grandmother     Breast cancer Paternal Grandfather     No Known Problems Cousin        Social History:  Social History     Tobacco Use    Smoking status: Never    Smokeless tobacco: Never   Substance Use Topics    Alcohol use: No    Drug use: No        Objective     Physical Exam:   Heart: rrr   Lungs: nml resp eff     Vital Signs  Temp:  [98.3 øF (36.8 øC)] 98.3 øF (36.8  øC)  Heart Rate:  [98] 98  Resp:  [18] 18  BP: (128)/(81) 128/81    Anticipated Surgical Procedure:  Port plcmt    The risks, benefits and alternatives of this procedure have been discussed with the patient or responsible party: Yes      Robert Smith MD  08/22/23  12:38 EDT

## 2023-09-01 ENCOUNTER — INFUSION (OUTPATIENT)
Dept: ONCOLOGY | Facility: HOSPITAL | Age: 63
End: 2023-09-01
Payer: MEDICARE

## 2023-09-01 VITALS
RESPIRATION RATE: 15 BRPM | OXYGEN SATURATION: 99 % | BODY MASS INDEX: 27.64 KG/M2 | HEIGHT: 61 IN | WEIGHT: 146.4 LBS | TEMPERATURE: 96 F | DIASTOLIC BLOOD PRESSURE: 88 MMHG | HEART RATE: 87 BPM | SYSTOLIC BLOOD PRESSURE: 152 MMHG

## 2023-09-01 DIAGNOSIS — D80.1 HYPOGAMMAGLOBULINEMIA: Primary | ICD-10-CM

## 2023-09-01 DIAGNOSIS — Z45.2 ENCOUNTER FOR ADJUSTMENT OR MANAGEMENT OF VASCULAR ACCESS DEVICE: ICD-10-CM

## 2023-09-01 DIAGNOSIS — I87.8 POOR VENOUS ACCESS: ICD-10-CM

## 2023-09-01 LAB
BASOPHILS # BLD AUTO: 0.08 10*3/MM3 (ref 0–0.2)
BASOPHILS NFR BLD AUTO: 0.8 % (ref 0–1.5)
DEPRECATED RDW RBC AUTO: 42.5 FL (ref 37–54)
EOSINOPHIL # BLD AUTO: 0.16 10*3/MM3 (ref 0–0.4)
EOSINOPHIL NFR BLD AUTO: 1.5 % (ref 0.3–6.2)
ERYTHROCYTE [DISTWIDTH] IN BLOOD BY AUTOMATED COUNT: 12.7 % (ref 12.3–15.4)
HCT VFR BLD AUTO: 34.9 % (ref 34–46.6)
HGB BLD-MCNC: 12.2 G/DL (ref 12–15.9)
IGA1 MFR SER: 196 MG/DL (ref 70–400)
IGG1 SER-MCNC: 996 MG/DL (ref 700–1600)
IGM SERPL-MCNC: 55 MG/DL (ref 40–230)
IMM GRANULOCYTES # BLD AUTO: 0.04 10*3/MM3 (ref 0–0.05)
IMM GRANULOCYTES NFR BLD AUTO: 0.4 % (ref 0–0.5)
LYMPHOCYTES # BLD AUTO: 2.11 10*3/MM3 (ref 0.7–3.1)
LYMPHOCYTES NFR BLD AUTO: 20.3 % (ref 19.6–45.3)
MCH RBC QN AUTO: 32.5 PG (ref 26.6–33)
MCHC RBC AUTO-ENTMCNC: 35 G/DL (ref 31.5–35.7)
MCV RBC AUTO: 93.1 FL (ref 79–97)
MONOCYTES # BLD AUTO: 0.4 10*3/MM3 (ref 0.1–0.9)
MONOCYTES NFR BLD AUTO: 3.8 % (ref 5–12)
NEUTROPHILS NFR BLD AUTO: 7.61 10*3/MM3 (ref 1.7–7)
NEUTROPHILS NFR BLD AUTO: 73.2 % (ref 42.7–76)
NRBC BLD AUTO-RTO: 0 /100 WBC (ref 0–0.2)
PLATELET # BLD AUTO: 349 10*3/MM3 (ref 140–450)
PMV BLD AUTO: 9.5 FL (ref 6–12)
RBC # BLD AUTO: 3.75 10*6/MM3 (ref 3.77–5.28)
WBC NRBC COR # BLD: 10.4 10*3/MM3 (ref 3.4–10.8)

## 2023-09-01 PROCEDURE — 63710000001 DIPHENHYDRAMINE PER 50 MG: Performed by: NURSE PRACTITIONER

## 2023-09-01 PROCEDURE — 25010000002 IMMUNE GLOBULIN (HUMAN) 10 GM/100ML SOLUTION: Performed by: NURSE PRACTITIONER

## 2023-09-01 PROCEDURE — 96366 THER/PROPH/DIAG IV INF ADDON: CPT

## 2023-09-01 PROCEDURE — 85025 COMPLETE CBC W/AUTO DIFF WBC: CPT | Performed by: INTERNAL MEDICINE

## 2023-09-01 PROCEDURE — 25010000002 IMMUNE GLOBULIN (HUMAN) 5 GM/50ML SOLUTION: Performed by: INTERNAL MEDICINE

## 2023-09-01 PROCEDURE — 25010000002 HEPARIN LOCK FLUSH PER 10 UNITS: Performed by: NURSE PRACTITIONER

## 2023-09-01 PROCEDURE — 96365 THER/PROPH/DIAG IV INF INIT: CPT

## 2023-09-01 PROCEDURE — 82784 ASSAY IGA/IGD/IGG/IGM EACH: CPT | Performed by: INTERNAL MEDICINE

## 2023-09-01 RX ORDER — HEPARIN SODIUM (PORCINE) LOCK FLUSH IV SOLN 100 UNIT/ML 100 UNIT/ML
500 SOLUTION INTRAVENOUS AS NEEDED
OUTPATIENT
Start: 2023-09-01

## 2023-09-01 RX ORDER — SODIUM CHLORIDE 9 MG/ML
250 INJECTION, SOLUTION INTRAVENOUS ONCE
Status: COMPLETED | OUTPATIENT
Start: 2023-09-01 | End: 2023-09-01

## 2023-09-01 RX ORDER — DIPHENHYDRAMINE HCL 25 MG
25 CAPSULE ORAL ONCE
Status: COMPLETED | OUTPATIENT
Start: 2023-09-01 | End: 2023-09-01

## 2023-09-01 RX ORDER — HEPARIN SODIUM (PORCINE) LOCK FLUSH IV SOLN 100 UNIT/ML 100 UNIT/ML
500 SOLUTION INTRAVENOUS AS NEEDED
Status: DISCONTINUED | OUTPATIENT
Start: 2023-09-01 | End: 2023-09-01 | Stop reason: HOSPADM

## 2023-09-01 RX ORDER — LIDOCAINE AND PRILOCAINE 25; 25 MG/G; MG/G
CREAM TOPICAL
Qty: 30 G | Refills: 0 | Status: SHIPPED | OUTPATIENT
Start: 2023-09-01

## 2023-09-01 RX ORDER — SODIUM CHLORIDE 0.9 % (FLUSH) 0.9 %
10 SYRINGE (ML) INJECTION AS NEEDED
OUTPATIENT
Start: 2023-09-01

## 2023-09-01 RX ORDER — SODIUM CHLORIDE 0.9 % (FLUSH) 0.9 %
10 SYRINGE (ML) INJECTION AS NEEDED
Status: DISCONTINUED | OUTPATIENT
Start: 2023-09-01 | End: 2023-09-01 | Stop reason: HOSPADM

## 2023-09-01 RX ORDER — ACETAMINOPHEN 325 MG/1
650 TABLET ORAL ONCE
Status: COMPLETED | OUTPATIENT
Start: 2023-09-01 | End: 2023-09-01

## 2023-09-01 RX ORDER — CEFDINIR 300 MG/1
300 CAPSULE ORAL 2 TIMES DAILY
COMMUNITY

## 2023-09-01 RX ADMIN — IMMUNE GLOBULIN (HUMAN) 10 G: 10 INJECTION INTRAVENOUS; SUBCUTANEOUS at 14:39

## 2023-09-01 RX ADMIN — Medication 10 ML: at 15:47

## 2023-09-01 RX ADMIN — SODIUM CHLORIDE 250 ML: 9 INJECTION, SOLUTION INTRAVENOUS at 13:20

## 2023-09-01 RX ADMIN — ACETAMINOPHEN 650 MG: 325 TABLET ORAL at 13:21

## 2023-09-01 RX ADMIN — Medication 500 UNITS: at 15:47

## 2023-09-01 RX ADMIN — IMMUNE GLOBULIN (HUMAN) 5 G: 10 INJECTION INTRAVENOUS; SUBCUTANEOUS at 13:53

## 2023-09-01 RX ADMIN — DIPHENHYDRAMINE HYDROCHLORIDE 25 MG: 25 CAPSULE ORAL at 13:20

## 2023-09-15 ENCOUNTER — INFUSION (OUTPATIENT)
Dept: ONCOLOGY | Facility: HOSPITAL | Age: 63
End: 2023-09-15
Payer: MEDICARE

## 2023-09-15 VITALS
RESPIRATION RATE: 16 BRPM | WEIGHT: 147.6 LBS | HEART RATE: 102 BPM | BODY MASS INDEX: 27.87 KG/M2 | OXYGEN SATURATION: 100 % | DIASTOLIC BLOOD PRESSURE: 71 MMHG | TEMPERATURE: 96.4 F | HEIGHT: 61 IN | SYSTOLIC BLOOD PRESSURE: 124 MMHG

## 2023-09-15 DIAGNOSIS — D80.1 HYPOGAMMAGLOBULINEMIA: Primary | ICD-10-CM

## 2023-09-15 DIAGNOSIS — I87.8 POOR VENOUS ACCESS: ICD-10-CM

## 2023-09-15 DIAGNOSIS — Z45.2 ENCOUNTER FOR ADJUSTMENT OR MANAGEMENT OF VASCULAR ACCESS DEVICE: ICD-10-CM

## 2023-09-15 LAB
BASOPHILS # BLD AUTO: 0.06 10*3/MM3 (ref 0–0.2)
BASOPHILS NFR BLD AUTO: 1 % (ref 0–1.5)
DEPRECATED RDW RBC AUTO: 43.4 FL (ref 37–54)
EOSINOPHIL # BLD AUTO: 0.14 10*3/MM3 (ref 0–0.4)
EOSINOPHIL NFR BLD AUTO: 2.3 % (ref 0.3–6.2)
ERYTHROCYTE [DISTWIDTH] IN BLOOD BY AUTOMATED COUNT: 13.2 % (ref 12.3–15.4)
HCT VFR BLD AUTO: 34.7 % (ref 34–46.6)
HGB BLD-MCNC: 12.2 G/DL (ref 12–15.9)
IGA1 MFR SER: 222 MG/DL (ref 70–400)
IGG1 SER-MCNC: 1129 MG/DL (ref 700–1600)
IGM SERPL-MCNC: 63 MG/DL (ref 40–230)
IMM GRANULOCYTES # BLD AUTO: 0.01 10*3/MM3 (ref 0–0.05)
IMM GRANULOCYTES NFR BLD AUTO: 0.2 % (ref 0–0.5)
LYMPHOCYTES # BLD AUTO: 1.73 10*3/MM3 (ref 0.7–3.1)
LYMPHOCYTES NFR BLD AUTO: 28.1 % (ref 19.6–45.3)
MCH RBC QN AUTO: 32.7 PG (ref 26.6–33)
MCHC RBC AUTO-ENTMCNC: 35.2 G/DL (ref 31.5–35.7)
MCV RBC AUTO: 93 FL (ref 79–97)
MONOCYTES # BLD AUTO: 0.31 10*3/MM3 (ref 0.1–0.9)
MONOCYTES NFR BLD AUTO: 5 % (ref 5–12)
NEUTROPHILS NFR BLD AUTO: 3.9 10*3/MM3 (ref 1.7–7)
NEUTROPHILS NFR BLD AUTO: 63.4 % (ref 42.7–76)
NRBC BLD AUTO-RTO: 0 /100 WBC (ref 0–0.2)
PLATELET # BLD AUTO: 442 10*3/MM3 (ref 140–450)
PMV BLD AUTO: 9.6 FL (ref 6–12)
RBC # BLD AUTO: 3.73 10*6/MM3 (ref 3.77–5.28)
WBC NRBC COR # BLD: 6.15 10*3/MM3 (ref 3.4–10.8)

## 2023-09-15 PROCEDURE — 82784 ASSAY IGA/IGD/IGG/IGM EACH: CPT | Performed by: INTERNAL MEDICINE

## 2023-09-15 PROCEDURE — 25010000002 IMMUNE GLOBULIN (HUMAN) 10 GM/100ML SOLUTION: Performed by: NURSE PRACTITIONER

## 2023-09-15 PROCEDURE — 25010000002 HEPARIN LOCK FLUSH PER 10 UNITS: Performed by: NURSE PRACTITIONER

## 2023-09-15 PROCEDURE — 63710000001 DIPHENHYDRAMINE PER 50 MG: Performed by: NURSE PRACTITIONER

## 2023-09-15 PROCEDURE — 25010000002 IMMUNE GLOBULIN (HUMAN) 5 GM/50ML SOLUTION: Performed by: INTERNAL MEDICINE

## 2023-09-15 PROCEDURE — 96365 THER/PROPH/DIAG IV INF INIT: CPT

## 2023-09-15 PROCEDURE — 85025 COMPLETE CBC W/AUTO DIFF WBC: CPT | Performed by: INTERNAL MEDICINE

## 2023-09-15 PROCEDURE — 96366 THER/PROPH/DIAG IV INF ADDON: CPT

## 2023-09-15 RX ORDER — HEPARIN SODIUM (PORCINE) LOCK FLUSH IV SOLN 100 UNIT/ML 100 UNIT/ML
500 SOLUTION INTRAVENOUS AS NEEDED
OUTPATIENT
Start: 2023-09-15

## 2023-09-15 RX ORDER — SODIUM CHLORIDE 9 MG/ML
250 INJECTION, SOLUTION INTRAVENOUS ONCE
Status: COMPLETED | OUTPATIENT
Start: 2023-09-15 | End: 2023-09-15

## 2023-09-15 RX ORDER — SODIUM CHLORIDE 0.9 % (FLUSH) 0.9 %
10 SYRINGE (ML) INJECTION AS NEEDED
OUTPATIENT
Start: 2023-09-15

## 2023-09-15 RX ORDER — DIPHENHYDRAMINE HYDROCHLORIDE 50 MG/ML
50 INJECTION INTRAMUSCULAR; INTRAVENOUS AS NEEDED
Status: CANCELLED | OUTPATIENT
Start: 2023-09-15

## 2023-09-15 RX ORDER — SODIUM CHLORIDE 0.9 % (FLUSH) 0.9 %
10 SYRINGE (ML) INJECTION AS NEEDED
Status: DISCONTINUED | OUTPATIENT
Start: 2023-09-15 | End: 2023-09-15 | Stop reason: HOSPADM

## 2023-09-15 RX ORDER — ACETAMINOPHEN 325 MG/1
650 TABLET ORAL ONCE
Status: COMPLETED | OUTPATIENT
Start: 2023-09-15 | End: 2023-09-15

## 2023-09-15 RX ORDER — DIPHENHYDRAMINE HCL 25 MG
25 CAPSULE ORAL ONCE
Status: COMPLETED | OUTPATIENT
Start: 2023-09-15 | End: 2023-09-15

## 2023-09-15 RX ORDER — FAMOTIDINE 10 MG/ML
20 INJECTION, SOLUTION INTRAVENOUS AS NEEDED
Status: CANCELLED | OUTPATIENT
Start: 2023-09-15

## 2023-09-15 RX ORDER — HEPARIN SODIUM (PORCINE) LOCK FLUSH IV SOLN 100 UNIT/ML 100 UNIT/ML
500 SOLUTION INTRAVENOUS AS NEEDED
Status: DISCONTINUED | OUTPATIENT
Start: 2023-09-15 | End: 2023-09-15 | Stop reason: HOSPADM

## 2023-09-15 RX ADMIN — IMMUNE GLOBULIN (HUMAN) 10 G: 10 INJECTION INTRAVENOUS; SUBCUTANEOUS at 14:36

## 2023-09-15 RX ADMIN — SODIUM CHLORIDE 250 ML: 9 INJECTION, SOLUTION INTRAVENOUS at 13:02

## 2023-09-15 RX ADMIN — IMMUNE GLOBULIN (HUMAN) 5 G: 10 INJECTION INTRAVENOUS; SUBCUTANEOUS at 13:55

## 2023-09-15 RX ADMIN — DIPHENHYDRAMINE HYDROCHLORIDE 25 MG: 25 CAPSULE ORAL at 13:23

## 2023-09-15 RX ADMIN — Medication 10 ML: at 15:38

## 2023-09-15 RX ADMIN — Medication 500 UNITS: at 15:38

## 2023-09-15 RX ADMIN — ACETAMINOPHEN 650 MG: 325 TABLET ORAL at 13:23

## 2023-09-15 NOTE — NURSING NOTE
Patient is tolerating IVIG without difficulty. Verbal order per Dr. Dominguez to continue IVIG as ordered.      Patient tolerated treatment today without complaints. Discharged in stable condition.

## 2023-09-29 ENCOUNTER — INFUSION (OUTPATIENT)
Dept: ONCOLOGY | Facility: HOSPITAL | Age: 63
End: 2023-09-29
Payer: MEDICARE

## 2023-09-29 ENCOUNTER — OFFICE VISIT (OUTPATIENT)
Dept: ONCOLOGY | Facility: CLINIC | Age: 63
End: 2023-09-29
Payer: MEDICARE

## 2023-09-29 VITALS
BODY MASS INDEX: 28.58 KG/M2 | WEIGHT: 151.4 LBS | HEART RATE: 102 BPM | RESPIRATION RATE: 16 BRPM | TEMPERATURE: 98.2 F | OXYGEN SATURATION: 96 % | HEIGHT: 61 IN | DIASTOLIC BLOOD PRESSURE: 87 MMHG | SYSTOLIC BLOOD PRESSURE: 151 MMHG

## 2023-09-29 VITALS — HEART RATE: 84 BPM | DIASTOLIC BLOOD PRESSURE: 72 MMHG | SYSTOLIC BLOOD PRESSURE: 123 MMHG

## 2023-09-29 DIAGNOSIS — M06.9 RHEUMATOID ARTHRITIS, INVOLVING UNSPECIFIED SITE, UNSPECIFIED WHETHER RHEUMATOID FACTOR PRESENT: ICD-10-CM

## 2023-09-29 DIAGNOSIS — D64.9 ANEMIA, UNSPECIFIED TYPE: ICD-10-CM

## 2023-09-29 DIAGNOSIS — D80.1 HYPOGAMMAGLOBULINEMIA: Primary | ICD-10-CM

## 2023-09-29 DIAGNOSIS — D80.1 HYPOGAMMAGLOBULINEMIA: ICD-10-CM

## 2023-09-29 DIAGNOSIS — I87.8 POOR VENOUS ACCESS: ICD-10-CM

## 2023-09-29 DIAGNOSIS — Z45.2 ENCOUNTER FOR ADJUSTMENT OR MANAGEMENT OF VASCULAR ACCESS DEVICE: ICD-10-CM

## 2023-09-29 LAB
BASOPHILS # BLD AUTO: 0.09 10*3/MM3 (ref 0–0.2)
BASOPHILS NFR BLD AUTO: 1.5 % (ref 0–1.5)
DEPRECATED RDW RBC AUTO: 41.5 FL (ref 37–54)
EOSINOPHIL # BLD AUTO: 0.22 10*3/MM3 (ref 0–0.4)
EOSINOPHIL NFR BLD AUTO: 3.6 % (ref 0.3–6.2)
ERYTHROCYTE [DISTWIDTH] IN BLOOD BY AUTOMATED COUNT: 12.7 % (ref 12.3–15.4)
HCT VFR BLD AUTO: 30.4 % (ref 34–46.6)
HGB BLD-MCNC: 10.8 G/DL (ref 12–15.9)
IGA1 MFR SER: 182 MG/DL (ref 70–400)
IGG1 SER-MCNC: 1051 MG/DL (ref 700–1600)
IGM SERPL-MCNC: 50 MG/DL (ref 40–230)
IMM GRANULOCYTES # BLD AUTO: 0.02 10*3/MM3 (ref 0–0.05)
IMM GRANULOCYTES NFR BLD AUTO: 0.3 % (ref 0–0.5)
LYMPHOCYTES # BLD AUTO: 2.35 10*3/MM3 (ref 0.7–3.1)
LYMPHOCYTES NFR BLD AUTO: 38.5 % (ref 19.6–45.3)
MCH RBC QN AUTO: 32.7 PG (ref 26.6–33)
MCHC RBC AUTO-ENTMCNC: 35.5 G/DL (ref 31.5–35.7)
MCV RBC AUTO: 92.1 FL (ref 79–97)
MONOCYTES # BLD AUTO: 0.32 10*3/MM3 (ref 0.1–0.9)
MONOCYTES NFR BLD AUTO: 5.2 % (ref 5–12)
NEUTROPHILS NFR BLD AUTO: 3.11 10*3/MM3 (ref 1.7–7)
NEUTROPHILS NFR BLD AUTO: 50.9 % (ref 42.7–76)
NRBC BLD AUTO-RTO: 0 /100 WBC (ref 0–0.2)
PLATELET # BLD AUTO: 304 10*3/MM3 (ref 140–450)
PMV BLD AUTO: 10.1 FL (ref 6–12)
RBC # BLD AUTO: 3.3 10*6/MM3 (ref 3.77–5.28)
WBC NRBC COR # BLD: 6.11 10*3/MM3 (ref 3.4–10.8)

## 2023-09-29 PROCEDURE — 63710000001 DIPHENHYDRAMINE PER 50 MG: Performed by: INTERNAL MEDICINE

## 2023-09-29 PROCEDURE — 82784 ASSAY IGA/IGD/IGG/IGM EACH: CPT | Performed by: INTERNAL MEDICINE

## 2023-09-29 PROCEDURE — 25010000002 HEPARIN LOCK FLUSH PER 10 UNITS: Performed by: NURSE PRACTITIONER

## 2023-09-29 PROCEDURE — 25010000002 IMMUNE GLOBULIN (HUMAN) 5 GM/50ML SOLUTION: Performed by: INTERNAL MEDICINE

## 2023-09-29 PROCEDURE — 85025 COMPLETE CBC W/AUTO DIFF WBC: CPT | Performed by: INTERNAL MEDICINE

## 2023-09-29 PROCEDURE — 96365 THER/PROPH/DIAG IV INF INIT: CPT

## 2023-09-29 PROCEDURE — 25010000002 IMMUNE GLOBULIN (HUMAN) 10 GM/100ML SOLUTION: Performed by: INTERNAL MEDICINE

## 2023-09-29 PROCEDURE — 96366 THER/PROPH/DIAG IV INF ADDON: CPT

## 2023-09-29 RX ORDER — SODIUM CHLORIDE 9 MG/ML
250 INJECTION, SOLUTION INTRAVENOUS ONCE
Status: CANCELLED | OUTPATIENT
Start: 2023-09-29

## 2023-09-29 RX ORDER — SODIUM CHLORIDE 9 MG/ML
250 INJECTION, SOLUTION INTRAVENOUS ONCE
Status: COMPLETED | OUTPATIENT
Start: 2023-09-29 | End: 2023-09-29

## 2023-09-29 RX ORDER — ACETAMINOPHEN 325 MG/1
650 TABLET ORAL ONCE
Status: COMPLETED | OUTPATIENT
Start: 2023-09-29 | End: 2023-09-29

## 2023-09-29 RX ORDER — SODIUM CHLORIDE 9 MG/ML
250 INJECTION, SOLUTION INTRAVENOUS ONCE
Status: CANCELLED | OUTPATIENT
Start: 2023-10-20

## 2023-09-29 RX ORDER — FAMOTIDINE 10 MG/ML
20 INJECTION, SOLUTION INTRAVENOUS AS NEEDED
Status: CANCELLED | OUTPATIENT
Start: 2023-09-29

## 2023-09-29 RX ORDER — FAMOTIDINE 10 MG/ML
20 INJECTION, SOLUTION INTRAVENOUS AS NEEDED
Status: CANCELLED | OUTPATIENT
Start: 2023-10-20

## 2023-09-29 RX ORDER — MEPERIDINE HYDROCHLORIDE 25 MG/ML
25 INJECTION INTRAMUSCULAR; INTRAVENOUS; SUBCUTANEOUS
Status: CANCELLED | OUTPATIENT
Start: 2023-10-20

## 2023-09-29 RX ORDER — SODIUM CHLORIDE 0.9 % (FLUSH) 0.9 %
10 SYRINGE (ML) INJECTION AS NEEDED
OUTPATIENT
Start: 2023-09-29

## 2023-09-29 RX ORDER — SODIUM CHLORIDE 0.9 % (FLUSH) 0.9 %
10 SYRINGE (ML) INJECTION AS NEEDED
Status: DISCONTINUED | OUTPATIENT
Start: 2023-09-29 | End: 2023-09-29 | Stop reason: HOSPADM

## 2023-09-29 RX ORDER — ACETAMINOPHEN 325 MG/1
650 TABLET ORAL ONCE
Status: CANCELLED | OUTPATIENT
Start: 2023-10-20

## 2023-09-29 RX ORDER — ACETAMINOPHEN 325 MG/1
650 TABLET ORAL ONCE
Status: CANCELLED | OUTPATIENT
Start: 2023-09-29

## 2023-09-29 RX ORDER — DIPHENHYDRAMINE HYDROCHLORIDE 50 MG/ML
50 INJECTION INTRAMUSCULAR; INTRAVENOUS AS NEEDED
Status: CANCELLED | OUTPATIENT
Start: 2023-09-29

## 2023-09-29 RX ORDER — DIPHENHYDRAMINE HCL 25 MG
25 CAPSULE ORAL ONCE
Status: CANCELLED | OUTPATIENT
Start: 2023-09-29

## 2023-09-29 RX ORDER — MEPERIDINE HYDROCHLORIDE 25 MG/ML
25 INJECTION INTRAMUSCULAR; INTRAVENOUS; SUBCUTANEOUS
Status: CANCELLED | OUTPATIENT
Start: 2023-09-29

## 2023-09-29 RX ORDER — DIPHENHYDRAMINE HCL 25 MG
25 CAPSULE ORAL ONCE
Status: COMPLETED | OUTPATIENT
Start: 2023-09-29 | End: 2023-09-29

## 2023-09-29 RX ORDER — DIPHENHYDRAMINE HYDROCHLORIDE 50 MG/ML
50 INJECTION INTRAMUSCULAR; INTRAVENOUS AS NEEDED
Status: CANCELLED | OUTPATIENT
Start: 2023-10-20

## 2023-09-29 RX ORDER — HEPARIN SODIUM (PORCINE) LOCK FLUSH IV SOLN 100 UNIT/ML 100 UNIT/ML
500 SOLUTION INTRAVENOUS AS NEEDED
Status: DISCONTINUED | OUTPATIENT
Start: 2023-09-29 | End: 2023-09-29 | Stop reason: HOSPADM

## 2023-09-29 RX ORDER — HEPARIN SODIUM (PORCINE) LOCK FLUSH IV SOLN 100 UNIT/ML 100 UNIT/ML
500 SOLUTION INTRAVENOUS AS NEEDED
OUTPATIENT
Start: 2023-09-29

## 2023-09-29 RX ORDER — DIPHENHYDRAMINE HCL 25 MG
25 CAPSULE ORAL ONCE
Status: CANCELLED | OUTPATIENT
Start: 2023-10-20

## 2023-09-29 RX ADMIN — DIPHENHYDRAMINE HYDROCHLORIDE 25 MG: 25 CAPSULE ORAL at 12:06

## 2023-09-29 RX ADMIN — SODIUM CHLORIDE 250 ML: 9 INJECTION, SOLUTION INTRAVENOUS at 12:34

## 2023-09-29 RX ADMIN — Medication 10 ML: at 14:27

## 2023-09-29 RX ADMIN — Medication 500 UNITS: at 14:27

## 2023-09-29 RX ADMIN — IMMUNE GLOBULIN (HUMAN) 10 G: 10 INJECTION INTRAVENOUS; SUBCUTANEOUS at 12:34

## 2023-09-29 RX ADMIN — IMMUNE GLOBULIN (HUMAN) 5 G: 10 INJECTION INTRAVENOUS; SUBCUTANEOUS at 13:52

## 2023-09-29 RX ADMIN — ACETAMINOPHEN 650 MG: 325 TABLET ORAL at 12:06

## 2023-09-29 NOTE — NURSING NOTE
Patient is tolerating IVIG without difficulty. Verbal order per Dr. Dominguez to continue IVIG as ordered.

## 2023-09-29 NOTE — PROGRESS NOTES
Subjective     REASON FOR FOLLOW UP:    Hypogammaglobulinemia; receiving Gamunex infusions every 2 weeks  2.  Iron deficiency anemia vs anemia of chronic disease.  Her anemia has improved significantly with oral iron supplementation.  3.  Aberrant T-cell population with absent CD7  4.  Reactive leukocytosis  5.  Reactive thrombocytosis  6.  Rheumatoid arthritis currently on methotrexate and followed by Dr. Ofelia Tinajero    HISTORY OF PRESENT ILLNESS:  The patient is a 63 y.o. year old female who was referred to us from her allergy/immunology office due to a finding of hypogammaglobulinemia and iron deficiency.  She has been experiencing recurring episodes of bronchitis/pneumonia and is also being followed by Dr. Julian Funez of pulmonary medicine.    As part of her immunology work-up she had T and B cell immunophenotyping performed which showed no obvious monoclonal process but did show an aberrant T-cell population with absent CD7.  The pathologist commented that this could be seen with reactive or neoplastic processes.    She also was noted to be anemic with iron studies that were somewhat ambiguous with normal ferritin level but a decreased iron saturation and normal TIBC.  It is unclear at this time whether this is most consistent with iron deficiency or anemia of chronic disease.    Her blood count in the office today shows an elevated white count with a preponderance of mature neutrophils and normal absolute lymphocytes.  Her hemoglobin was slightly better at 11.4 g/dL compared to a hemoglobin of 10.5 with elevated platelets of 563,000 on her labs from 8/15/2022.  Platelet count today is within normal limits at 434,000.      She has been taking an iron supplement once daily with improvement in her hemoglobin.     She tells me that she has had chronic back problems and has had 19 separate back related surgeries.  After the most recent surgery she coded and had to be resuscitated.  She reports that most of her  health issues have developed since that episode.    She was evaluated by rheumatology and diagnosed with rheumatoid arthritis.  She initially was given steroid medication but now is going to be taking weekly methotrexate under the direction of Dr. Ofelia Tinajero    She was referred to family [facility] and E[facility] and was started on Gamunex infusions every 2 weeks by Dr. Fontanez.  He is now retiring and she is asking us to take over the infusions.    INTERVAL HISTORY:  She had labs performed on 9/15/2023 which look much better.  Her IgG level was up to 1129.  Her hemoglobin is now now normal at 12.2 and her white count is normal.  Platelet count is also normal at 442,000.    Because her IgG level is significantly improved we we will try  to space out her IVIG infusions from every 2 weeks to every 3 weeks.  History of Present Illness     Past Medical History:   Diagnosis Date    Anemia     Bone infection     DDD (degenerative disc disease), lumbar     Depression     Disease of thyroid gland     H/O transfusion of packed red blood cells     Hypertension     Neuropathy         Past Surgical History:   Procedure Laterality Date    APPENDECTOMY      BREAST SURGERY      BUNIONECTOMY  2017    CATARACT EXTRACTION Bilateral 2014    CHOLECYSTECTOMY      COLONOSCOPY  2011    DILATATION AND CURETTAGE      EYE SURGERY      HAMMER TOE REPAIR      LUMBAR SPINE SURGERY      NECK SURGERY  2021    decompression fusion C3-C4, C6-C7    THYROID SURGERY      TUBAL ABDOMINAL LIGATION          Current Outpatient Medications on File Prior to Visit   Medication Sig Dispense Refill    albuterol sulfate  (90 Base) MCG/ACT inhaler Inhale 2 puffs.      ARIPiprazole (ABILIFY) 5 MG tablet       calcium citrate-vitamin d (CALCITRATE) 315-250 MG-UNIT tablet tablet Take  by mouth.      cefdinir (OMNICEF) 300 MG capsule Take 1 capsule by mouth 2 (Two) Times a Day.      celecoxib (CeleBREX) 200 MG capsule Take 2 capsules by mouth 2 (Two) Times a Day.       clonazePAM (KlonoPIN) 0.5 MG tablet       cyclobenzaprine (FLEXERIL) 10 MG tablet Take 1 tablet by mouth 3 (Three) Times a Day As Needed for Muscle Spasms.      doxepin (SINEquan) 50 MG capsule Take 1 capsule by mouth Every Night.      DULoxetine (CYMBALTA) 60 MG capsule Take  by mouth.      DULoxetine (CYMBALTA) 60 MG capsule Take 1 capsule by mouth 2 (Two) Times a Day.      esomeprazole (nexIUM) 40 MG capsule Take 1 capsule by mouth Daily.      FeroSul 325 (65 Fe) MG tablet TAKE 1 TABLET BY MOUTH EVERY DAY WITH BREAKFAST 30 tablet 1    ferrous gluconate (FERGON) 324 MG tablet TAKE 1 TABLET BY MOUTH DAILY 30 tablet 2    folic acid (FOLVITE) 1 MG tablet Take 2 tablets by mouth Daily.      furosemide (LASIX) 40 MG tablet Take 1 tablet by mouth 2 (Two) Times a Day.      gabapentin (NEURONTIN) 800 MG tablet Take 1 tablet by mouth 3 (Three) Times a Day.      hydrochlorothiazide (HYDRODIURIL) 25 MG tablet Take 1 tablet by mouth Daily.      HYDROcodone-acetaminophen (NORCO) 5-325 MG per tablet Take 1 tablet by mouth Every 4 (Four) Hours As Needed.      immune globulin, human, (GAMUNEX-C) 20 GM/200ML solution infusion Inject 20 g as directed See Admin Instructions.      lidocaine-prilocaine (EMLA) 2.5-2.5 % cream Apply to port site 1 hour prior to accessing port 30 g 0    methotrexate 2.5 MG tablet Take 8 tablets by mouth 1 (One) Time Per Week.      multivitamin (THERAGRAN) tablet tablet Take  by mouth.      ondansetron (ZOFRAN) 4 MG tablet TAKE 1 TABLET BY MOUTH EVERY 12 HOURS AS NEEDED FOR NAUSEA      oxyCODONE-acetaminophen (PERCOCET)  MG per tablet       potassium chloride (K-DUR,KLOR-CON) 20 MEQ CR tablet Take 1 tablet by mouth 2 (Two) Times a Day.      saccharomyces boulardii (FLORASTOR) 250 MG capsule Take 1 capsule by mouth.      Tirosint 150 MCG capsule       Ventolin  (90 Base) MCG/ACT inhaler       Xiidra 5 % ophthalmic solution       zolpidem (AMBIEN) 10 MG tablet Take 1 tablet by mouth At Night  "As Needed for Sleep.      Tirosint 25 MCG capsule  (Patient not taking: Reported on 9/29/2023)       No current facility-administered medications on file prior to visit.        ALLERGIES:    Allergies   Allergen Reactions    Levothyroxine Other (See Comments)     Unable to take due to NO THYROID    Diclofenac Swelling    Losartan Other (See Comments)     \"GIVES ME THE JITTERS\"  \"GIVES ME THE JITTERS\"  <paragraph>\"GIVES ME THE JITTERS\"</paragraph>      Other Unknown - Low Severity    Adhesive Tape Rash     blisters    Baclofen Other (See Comments)     Makes me crazy, jitters when mixed with other pain medications     Meperidine Hives        Social History     Socioeconomic History    Marital status:      Spouse name: J Luis   Tobacco Use    Smoking status: Never    Smokeless tobacco: Never   Substance and Sexual Activity    Alcohol use: No    Drug use: No    Sexual activity: Defer        Family History   Problem Relation Age of Onset    Hypertension Mother     Heart disease Mother     Heart failure Mother     Stroke Father     No Known Problems Sister     Heart failure Brother     No Known Problems Daughter     No Known Problems Son     Breast cancer Maternal Grandmother     No Known Problems Maternal Grandfather     No Known Problems Paternal Grandmother     Breast cancer Paternal Grandfather     No Known Problems Cousin         Review of Systems   Constitutional:  Positive for activity change and fatigue. Negative for chills and fever.   HENT:  Negative for mouth sores, trouble swallowing and voice change.    Eyes:  Negative for pain and visual disturbance.   Respiratory:  Negative for cough, shortness of breath and wheezing.    Cardiovascular:  Negative for chest pain and palpitations.   Gastrointestinal:  Positive for nausea. Negative for abdominal pain, constipation, diarrhea and vomiting.        She tells me she was recently diagnosed with gastroparesis   Genitourinary:  Negative for difficulty " "urinating, frequency and urgency.   Musculoskeletal:  Positive for arthralgias, back pain, gait problem, joint swelling and neck stiffness.   Skin:  Negative for rash.   Neurological:  Negative for dizziness, seizures, weakness and headaches.   Hematological:  Negative for adenopathy. Does not bruise/bleed easily.   Psychiatric/Behavioral:  Negative for behavioral problems and confusion. The patient is not nervous/anxious.       Objective     Vitals:    09/29/23 1118   BP: 151/87   Pulse: 102   Resp: 16   Temp: 98.2 °F (36.8 °C)   TempSrc: Temporal   SpO2: 96%   Weight: 68.7 kg (151 lb 6.4 oz)   Height: 154.9 cm (60.98\")   PainSc:   6         9/29/2023    11:18 AM   Current Status   ECOG score 2       Physical Exam  Constitutional:       General: She is not in acute distress.     Appearance: She is well-developed.   HENT:      Head: Normocephalic.   Eyes:      General: No scleral icterus.     Conjunctiva/sclera: Conjunctivae normal.      Pupils: Pupils are equal, round, and reactive to light.   Neck:      Thyroid: No thyromegaly.      Vascular: No JVD.   Cardiovascular:      Rate and Rhythm: Normal rate and regular rhythm.      Heart sounds: No murmur heard.    No friction rub. No gallop.   Pulmonary:      Effort: Pulmonary effort is normal.      Breath sounds: Normal breath sounds. No wheezing or rales.   Abdominal:      General: There is no distension.      Palpations: Abdomen is soft. There is no mass.      Tenderness: There is no abdominal tenderness.   Musculoskeletal:         General: Swelling and deformity present. Normal range of motion.      Cervical back: Normal range of motion and neck supple.      Comments: She has visible swelling and redness of the metacarpal joints bilaterally.  She is chronically stooped over due to her severe back problems.  She is ambulating without a cane or walker.   Lymphadenopathy:      Cervical: No cervical adenopathy.   Skin:     General: Skin is warm and dry.      Findings: " No erythema or rash.   Neurological:      Mental Status: She is alert and oriented to person, place, and time.      Cranial Nerves: No cranial nerve deficit.      Deep Tendon Reflexes: Reflexes are normal and symmetric.   Psychiatric:         Behavior: Behavior normal.         Judgment: Judgment normal.         RECENT LABS:  Hematology WBC   Date Value Ref Range Status   09/15/2023 6.15 3.40 - 10.80 10*3/mm3 Final   07/12/2023 6.57 4.5 - 11.0 10*3/uL Final     RBC   Date Value Ref Range Status   09/15/2023 3.73 (L) 3.77 - 5.28 10*6/mm3 Final   07/12/2023 3.59 (L) 4.0 - 5.2 10*6/uL Final     Hemoglobin   Date Value Ref Range Status   09/15/2023 12.2 12.0 - 15.9 g/dL Final   07/12/2023 11.4 (L) 12.0 - 16.0 g/dL Final     Hematocrit   Date Value Ref Range Status   09/15/2023 34.7 34.0 - 46.6 % Final   07/12/2023 31.7 (L) 36.0 - 46.0 % Final     Platelets   Date Value Ref Range Status   09/15/2023 442 140 - 450 10*3/mm3 Final   07/12/2023 513 (H) 140 - 440 10*3/uL Final        Lab Results   Component Value Date    GLUCOSE 106 (H) 08/22/2023    BUN 33 (H) 08/22/2023    CREATININE 0.78 08/22/2023    BCR 42.3 (H) 08/22/2023    K 3.6 08/22/2023    CO2 29.0 08/22/2023    CALCIUM 8.9 08/22/2023    PROTENTOTREF 7.5 06/16/2023    ALBUMIN 4.3 06/16/2023    ALBUMIN 4.4 06/16/2023    LABIL2 1.5 06/16/2023    AST 21 06/16/2023    ALT 11 06/16/2023     Lab Results   Component Value Date    IRON 74 07/12/2023    TIBC 301 07/12/2023    FERRITIN 171.0 (H) 07/12/2023   SAT 26%    9/2/2022  TONG Direct   Negative Positive Abnormal       Lab Results   Component Value Date    CRP 1.30 (H) 09/02/2022     Lab Results   Component Value Date    SEDRATE 32 (H) 09/02/2022               Component  Ref Range & Units 2 wk ago  (9/15/23) 4 wk ago  (9/1/23) 1 mo ago  (8/18/23) 1 mo ago  (8/4/23) 2 mo ago  (7/21/23) 2 mo ago  (7/6/23) 3 mo ago  (6/21/23)   IgG  700 - 1,600 mg/dL 1,129 996 1,052 913 1,002 820         IMAGING:  CT SCAN OF THE CHEST  WITHOUT CONTRAST  7/7/2022  CONCLUSION:     1. Worsening subtotal middle lobe atelectasis.   2. Development of mild multifocal bilateral lower lobe reticulonodular pulmonary infiltrate.   3. Small but increasing pericardial thickening/effusion.   4. Chronic changes of the chest are stable including the right upper lobe lung nodule.   5. Postoperative thoracic spine.       Assessment & Plan   1.  Hypogammaglobulinemia with recurring pulmonary infections.  She is doing much better since starting on Gamunex infusions every 2 weeks.  Her allergist is retiring and she has asked us to take over the infusions.  Her IgG level has improved and at this point we will try to modify her IVIG dosing to every 3 weeks.  2.  Anemia with somewhat ambiguous iron studies.  Her hemoglobin has normalized with once daily iron supplementation.  3.  Aberrant population of CD7 negative T cells noted on T and B cell analysis.  There is no evidence of monoclonal B-cell population.  We feel this is a reactive T-cell population and not indicative of an underlying T-cell leukemia or lymphoma.  4.  Positive TONG and elevated markers of inflammation.  Patient has been evaluated by rheumatology and will be following up with Dr. Ofelia Tinajero.  He currently is on weekly methotrexate.    PLAN  1.  She will continue her iron supplement once daily for now.  2.  We will plan to continue her Gamunex infusions but will modify her schedule to every 3 weeks at our [place] office. .  3.  MD follow-up in 9 weekss with repeat labs including repeat iron studies and immunoglobulin level.

## 2023-10-27 ENCOUNTER — INFUSION (OUTPATIENT)
Dept: ONCOLOGY | Facility: HOSPITAL | Age: 63
End: 2023-10-27
Payer: MEDICARE

## 2023-10-27 VITALS
HEIGHT: 61 IN | SYSTOLIC BLOOD PRESSURE: 131 MMHG | WEIGHT: 155.8 LBS | BODY MASS INDEX: 29.42 KG/M2 | OXYGEN SATURATION: 98 % | DIASTOLIC BLOOD PRESSURE: 79 MMHG | HEART RATE: 80 BPM | RESPIRATION RATE: 15 BRPM | TEMPERATURE: 97.5 F

## 2023-10-27 DIAGNOSIS — Z45.2 ENCOUNTER FOR ADJUSTMENT OR MANAGEMENT OF VASCULAR ACCESS DEVICE: ICD-10-CM

## 2023-10-27 DIAGNOSIS — D80.1 HYPOGAMMAGLOBULINEMIA: Primary | ICD-10-CM

## 2023-10-27 DIAGNOSIS — I87.8 POOR VENOUS ACCESS: ICD-10-CM

## 2023-10-27 LAB
BASOPHILS # BLD AUTO: 0.11 10*3/MM3 (ref 0–0.2)
BASOPHILS NFR BLD AUTO: 1.3 % (ref 0–1.5)
DEPRECATED RDW RBC AUTO: 45.9 FL (ref 37–54)
EOSINOPHIL # BLD AUTO: 0.53 10*3/MM3 (ref 0–0.4)
EOSINOPHIL NFR BLD AUTO: 6.1 % (ref 0.3–6.2)
ERYTHROCYTE [DISTWIDTH] IN BLOOD BY AUTOMATED COUNT: 13.6 % (ref 12.3–15.4)
HCT VFR BLD AUTO: 31.5 % (ref 34–46.6)
HGB BLD-MCNC: 11.1 G/DL (ref 12–15.9)
IGA1 MFR SER: 178 MG/DL (ref 70–400)
IGG1 SER-MCNC: 871 MG/DL (ref 700–1600)
IGM SERPL-MCNC: 46 MG/DL (ref 40–230)
IMM GRANULOCYTES # BLD AUTO: 0.06 10*3/MM3 (ref 0–0.05)
IMM GRANULOCYTES NFR BLD AUTO: 0.7 % (ref 0–0.5)
LYMPHOCYTES # BLD AUTO: 3.13 10*3/MM3 (ref 0.7–3.1)
LYMPHOCYTES NFR BLD AUTO: 35.7 % (ref 19.6–45.3)
MCH RBC QN AUTO: 32.9 PG (ref 26.6–33)
MCHC RBC AUTO-ENTMCNC: 35.2 G/DL (ref 31.5–35.7)
MCV RBC AUTO: 93.5 FL (ref 79–97)
MONOCYTES # BLD AUTO: 0.53 10*3/MM3 (ref 0.1–0.9)
MONOCYTES NFR BLD AUTO: 6.1 % (ref 5–12)
NEUTROPHILS NFR BLD AUTO: 4.4 10*3/MM3 (ref 1.7–7)
NEUTROPHILS NFR BLD AUTO: 50.1 % (ref 42.7–76)
NRBC BLD AUTO-RTO: 0 /100 WBC (ref 0–0.2)
PLATELET # BLD AUTO: 367 10*3/MM3 (ref 140–450)
PMV BLD AUTO: 9.9 FL (ref 6–12)
RBC # BLD AUTO: 3.37 10*6/MM3 (ref 3.77–5.28)
WBC NRBC COR # BLD: 8.76 10*3/MM3 (ref 3.4–10.8)

## 2023-10-27 PROCEDURE — 25010000002 IMMUNE GLOBULIN (HUMAN) 10 GM/100ML SOLUTION: Performed by: NURSE PRACTITIONER

## 2023-10-27 PROCEDURE — 96365 THER/PROPH/DIAG IV INF INIT: CPT

## 2023-10-27 PROCEDURE — 85025 COMPLETE CBC W/AUTO DIFF WBC: CPT | Performed by: INTERNAL MEDICINE

## 2023-10-27 PROCEDURE — 63710000001 DIPHENHYDRAMINE PER 50 MG: Performed by: NURSE PRACTITIONER

## 2023-10-27 PROCEDURE — 82784 ASSAY IGA/IGD/IGG/IGM EACH: CPT | Performed by: INTERNAL MEDICINE

## 2023-10-27 PROCEDURE — 25810000003 SODIUM CHLORIDE 0.9 % SOLUTION: Performed by: NURSE PRACTITIONER

## 2023-10-27 PROCEDURE — 25010000002 HEPARIN LOCK FLUSH PER 10 UNITS: Performed by: NURSE PRACTITIONER

## 2023-10-27 RX ORDER — HEPARIN SODIUM (PORCINE) LOCK FLUSH IV SOLN 100 UNIT/ML 100 UNIT/ML
500 SOLUTION INTRAVENOUS AS NEEDED
OUTPATIENT
Start: 2023-10-27

## 2023-10-27 RX ORDER — SODIUM CHLORIDE 0.9 % (FLUSH) 0.9 %
10 SYRINGE (ML) INJECTION AS NEEDED
OUTPATIENT
Start: 2023-10-27

## 2023-10-27 RX ORDER — DIPHENHYDRAMINE HCL 25 MG
25 CAPSULE ORAL ONCE
Status: CANCELLED | OUTPATIENT
Start: 2023-10-27

## 2023-10-27 RX ORDER — DIPHENHYDRAMINE HCL 25 MG
25 CAPSULE ORAL ONCE
Status: COMPLETED | OUTPATIENT
Start: 2023-10-27 | End: 2023-10-27

## 2023-10-27 RX ORDER — HEPARIN SODIUM (PORCINE) LOCK FLUSH IV SOLN 100 UNIT/ML 100 UNIT/ML
500 SOLUTION INTRAVENOUS AS NEEDED
Status: DISCONTINUED | OUTPATIENT
Start: 2023-10-27 | End: 2023-10-27 | Stop reason: HOSPADM

## 2023-10-27 RX ORDER — FAMOTIDINE 10 MG/ML
20 INJECTION, SOLUTION INTRAVENOUS AS NEEDED
Status: CANCELLED | OUTPATIENT
Start: 2023-10-27

## 2023-10-27 RX ORDER — SODIUM CHLORIDE 0.9 % (FLUSH) 0.9 %
10 SYRINGE (ML) INJECTION AS NEEDED
Status: DISCONTINUED | OUTPATIENT
Start: 2023-10-27 | End: 2023-10-27 | Stop reason: HOSPADM

## 2023-10-27 RX ORDER — SODIUM CHLORIDE 9 MG/ML
250 INJECTION, SOLUTION INTRAVENOUS ONCE
Status: CANCELLED | OUTPATIENT
Start: 2023-10-27

## 2023-10-27 RX ORDER — MEPERIDINE HYDROCHLORIDE 25 MG/ML
25 INJECTION INTRAMUSCULAR; INTRAVENOUS; SUBCUTANEOUS
Status: CANCELLED | OUTPATIENT
Start: 2023-10-27

## 2023-10-27 RX ORDER — SODIUM CHLORIDE 9 MG/ML
250 INJECTION, SOLUTION INTRAVENOUS ONCE
Status: COMPLETED | OUTPATIENT
Start: 2023-10-27 | End: 2023-10-27

## 2023-10-27 RX ORDER — ACETAMINOPHEN 325 MG/1
650 TABLET ORAL ONCE
Status: CANCELLED | OUTPATIENT
Start: 2023-10-27

## 2023-10-27 RX ORDER — DIPHENHYDRAMINE HYDROCHLORIDE 50 MG/ML
50 INJECTION INTRAMUSCULAR; INTRAVENOUS AS NEEDED
Status: CANCELLED | OUTPATIENT
Start: 2023-10-27

## 2023-10-27 RX ORDER — ACETAMINOPHEN 325 MG/1
650 TABLET ORAL ONCE
Status: COMPLETED | OUTPATIENT
Start: 2023-10-27 | End: 2023-10-27

## 2023-10-27 RX ADMIN — Medication 10 ML: at 15:23

## 2023-10-27 RX ADMIN — ACETAMINOPHEN 650 MG: 325 TABLET ORAL at 13:27

## 2023-10-27 RX ADMIN — SODIUM CHLORIDE 250 ML: 9 INJECTION, SOLUTION INTRAVENOUS at 13:10

## 2023-10-27 RX ADMIN — DIPHENHYDRAMINE HYDROCHLORIDE 25 MG: 25 CAPSULE ORAL at 13:27

## 2023-10-27 RX ADMIN — Medication 500 UNITS: at 15:23

## 2023-10-27 RX ADMIN — IMMUNE GLOBULIN (HUMAN) 10 G: 10 INJECTION INTRAVENOUS; SUBCUTANEOUS at 13:42

## 2023-10-27 NOTE — NURSING NOTE
Patient to infusion room for IVIG. Patient states she woke up late last week and that is why she had to cancel that appointment.    Called and s/w Georgie Lopes (clinic RN for Dr Dominguez) to clarify dose change to 10g. Georgie states that Dr Dominguez ordered her to adjust patient's IVIG plan to 10g every 3 weeks. Patient verbalizes understanding.    Patient is tolerating IVIG without difficulty. Verbal order per Dr. Dominguez to continue IVIG as ordered.      Patient tolerated treatment without complaints. Discharged in stable condition.

## 2023-11-02 RX ORDER — FERROUS GLUCONATE 324(38)MG
324 TABLET ORAL DAILY
Qty: 30 TABLET | Refills: 2 | Status: SHIPPED | OUTPATIENT
Start: 2023-11-02

## 2023-11-17 ENCOUNTER — INFUSION (OUTPATIENT)
Dept: ONCOLOGY | Facility: HOSPITAL | Age: 63
End: 2023-11-17
Payer: MEDICARE

## 2023-11-17 VITALS
HEART RATE: 84 BPM | TEMPERATURE: 95.6 F | OXYGEN SATURATION: 97 % | DIASTOLIC BLOOD PRESSURE: 82 MMHG | SYSTOLIC BLOOD PRESSURE: 125 MMHG | WEIGHT: 151.4 LBS | BODY MASS INDEX: 28.58 KG/M2 | HEIGHT: 61 IN | RESPIRATION RATE: 15 BRPM

## 2023-11-17 DIAGNOSIS — D80.1 HYPOGAMMAGLOBULINEMIA: Primary | ICD-10-CM

## 2023-11-17 DIAGNOSIS — Z45.2 ENCOUNTER FOR ADJUSTMENT OR MANAGEMENT OF VASCULAR ACCESS DEVICE: ICD-10-CM

## 2023-11-17 DIAGNOSIS — I87.8 POOR VENOUS ACCESS: ICD-10-CM

## 2023-11-17 LAB
BASOPHILS # BLD AUTO: 0.06 10*3/MM3 (ref 0–0.2)
BASOPHILS NFR BLD AUTO: 1.1 % (ref 0–1.5)
DEPRECATED RDW RBC AUTO: 44.8 FL (ref 37–54)
EOSINOPHIL # BLD AUTO: 0.12 10*3/MM3 (ref 0–0.4)
EOSINOPHIL NFR BLD AUTO: 2.3 % (ref 0.3–6.2)
ERYTHROCYTE [DISTWIDTH] IN BLOOD BY AUTOMATED COUNT: 13.4 % (ref 12.3–15.4)
HCT VFR BLD AUTO: 33.5 % (ref 34–46.6)
HGB BLD-MCNC: 11.9 G/DL (ref 12–15.9)
IGA1 MFR SER: 209 MG/DL (ref 70–400)
IGG1 SER-MCNC: 872 MG/DL (ref 700–1600)
IGM SERPL-MCNC: 46 MG/DL (ref 40–230)
IMM GRANULOCYTES # BLD AUTO: 0.02 10*3/MM3 (ref 0–0.05)
IMM GRANULOCYTES NFR BLD AUTO: 0.4 % (ref 0–0.5)
LYMPHOCYTES # BLD AUTO: 1.64 10*3/MM3 (ref 0.7–3.1)
LYMPHOCYTES NFR BLD AUTO: 31.1 % (ref 19.6–45.3)
MCH RBC QN AUTO: 32.8 PG (ref 26.6–33)
MCHC RBC AUTO-ENTMCNC: 35.5 G/DL (ref 31.5–35.7)
MCV RBC AUTO: 92.3 FL (ref 79–97)
MONOCYTES # BLD AUTO: 0.27 10*3/MM3 (ref 0.1–0.9)
MONOCYTES NFR BLD AUTO: 5.1 % (ref 5–12)
NEUTROPHILS NFR BLD AUTO: 3.17 10*3/MM3 (ref 1.7–7)
NEUTROPHILS NFR BLD AUTO: 60 % (ref 42.7–76)
NRBC BLD AUTO-RTO: 0 /100 WBC (ref 0–0.2)
PLATELET # BLD AUTO: 423 10*3/MM3 (ref 140–450)
PMV BLD AUTO: 9.4 FL (ref 6–12)
RBC # BLD AUTO: 3.63 10*6/MM3 (ref 3.77–5.28)
WBC NRBC COR # BLD AUTO: 5.28 10*3/MM3 (ref 3.4–10.8)

## 2023-11-17 PROCEDURE — 82784 ASSAY IGA/IGD/IGG/IGM EACH: CPT | Performed by: INTERNAL MEDICINE

## 2023-11-17 PROCEDURE — 63710000001 DIPHENHYDRAMINE PER 50 MG: Performed by: INTERNAL MEDICINE

## 2023-11-17 PROCEDURE — 85025 COMPLETE CBC W/AUTO DIFF WBC: CPT | Performed by: INTERNAL MEDICINE

## 2023-11-17 PROCEDURE — 25810000003 SODIUM CHLORIDE 0.9 % SOLUTION: Performed by: INTERNAL MEDICINE

## 2023-11-17 PROCEDURE — 25010000002 IMMUNE GLOBULIN (HUMAN) 10 GM/100ML SOLUTION: Performed by: INTERNAL MEDICINE

## 2023-11-17 PROCEDURE — 96365 THER/PROPH/DIAG IV INF INIT: CPT

## 2023-11-17 PROCEDURE — 25010000002 HEPARIN LOCK FLUSH PER 10 UNITS: Performed by: NURSE PRACTITIONER

## 2023-11-17 RX ORDER — DIPHENHYDRAMINE HCL 25 MG
25 CAPSULE ORAL ONCE
Status: COMPLETED | OUTPATIENT
Start: 2023-11-17 | End: 2023-11-17

## 2023-11-17 RX ORDER — SODIUM CHLORIDE 9 MG/ML
250 INJECTION, SOLUTION INTRAVENOUS ONCE
Status: COMPLETED | OUTPATIENT
Start: 2023-11-17 | End: 2023-11-17

## 2023-11-17 RX ORDER — ACETAMINOPHEN 325 MG/1
650 TABLET ORAL ONCE
Status: COMPLETED | OUTPATIENT
Start: 2023-11-17 | End: 2023-11-17

## 2023-11-17 RX ORDER — SODIUM CHLORIDE 0.9 % (FLUSH) 0.9 %
10 SYRINGE (ML) INJECTION AS NEEDED
Status: DISCONTINUED | OUTPATIENT
Start: 2023-11-17 | End: 2023-11-17 | Stop reason: HOSPADM

## 2023-11-17 RX ORDER — SODIUM CHLORIDE 0.9 % (FLUSH) 0.9 %
10 SYRINGE (ML) INJECTION AS NEEDED
OUTPATIENT
Start: 2023-11-17

## 2023-11-17 RX ORDER — HEPARIN SODIUM (PORCINE) LOCK FLUSH IV SOLN 100 UNIT/ML 100 UNIT/ML
500 SOLUTION INTRAVENOUS AS NEEDED
OUTPATIENT
Start: 2023-11-17

## 2023-11-17 RX ORDER — HEPARIN SODIUM (PORCINE) LOCK FLUSH IV SOLN 100 UNIT/ML 100 UNIT/ML
500 SOLUTION INTRAVENOUS AS NEEDED
Status: DISCONTINUED | OUTPATIENT
Start: 2023-11-17 | End: 2023-11-17 | Stop reason: HOSPADM

## 2023-11-17 RX ADMIN — DIPHENHYDRAMINE HYDROCHLORIDE 25 MG: 25 CAPSULE ORAL at 13:19

## 2023-11-17 RX ADMIN — Medication 500 UNITS: at 14:58

## 2023-11-17 RX ADMIN — ACETAMINOPHEN 650 MG: 325 TABLET ORAL at 13:19

## 2023-11-17 RX ADMIN — SODIUM CHLORIDE 250 ML: 9 INJECTION, SOLUTION INTRAVENOUS at 13:22

## 2023-11-17 RX ADMIN — Medication 10 ML: at 14:58

## 2023-11-17 RX ADMIN — IMMUNE GLOBULIN (HUMAN) 10 G: 10 INJECTION INTRAVENOUS; SUBCUTANEOUS at 13:33

## 2023-12-08 ENCOUNTER — INFUSION (OUTPATIENT)
Dept: ONCOLOGY | Facility: HOSPITAL | Age: 63
End: 2023-12-08
Payer: MEDICARE

## 2023-12-08 ENCOUNTER — OFFICE VISIT (OUTPATIENT)
Dept: ONCOLOGY | Facility: CLINIC | Age: 63
End: 2023-12-08
Payer: MEDICARE

## 2023-12-08 VITALS
OXYGEN SATURATION: 95 % | TEMPERATURE: 98 F | BODY MASS INDEX: 29.83 KG/M2 | WEIGHT: 158 LBS | DIASTOLIC BLOOD PRESSURE: 80 MMHG | HEART RATE: 91 BPM | SYSTOLIC BLOOD PRESSURE: 137 MMHG | RESPIRATION RATE: 16 BRPM | HEIGHT: 61 IN

## 2023-12-08 VITALS — HEART RATE: 80 BPM | SYSTOLIC BLOOD PRESSURE: 132 MMHG | DIASTOLIC BLOOD PRESSURE: 79 MMHG

## 2023-12-08 DIAGNOSIS — D80.1 HYPOGAMMAGLOBULINEMIA: Primary | ICD-10-CM

## 2023-12-08 DIAGNOSIS — D64.9 ANEMIA, UNSPECIFIED TYPE: ICD-10-CM

## 2023-12-08 DIAGNOSIS — Z45.2 ENCOUNTER FOR ADJUSTMENT OR MANAGEMENT OF VASCULAR ACCESS DEVICE: ICD-10-CM

## 2023-12-08 DIAGNOSIS — I87.8 POOR VENOUS ACCESS: ICD-10-CM

## 2023-12-08 DIAGNOSIS — M06.9 RHEUMATOID ARTHRITIS, INVOLVING UNSPECIFIED SITE, UNSPECIFIED WHETHER RHEUMATOID FACTOR PRESENT: ICD-10-CM

## 2023-12-08 DIAGNOSIS — D80.1 HYPOGAMMAGLOBULINEMIA: ICD-10-CM

## 2023-12-08 DIAGNOSIS — R79.9 ABNORMAL FINDING OF BLOOD CHEMISTRY, UNSPECIFIED: ICD-10-CM

## 2023-12-08 LAB
ALBUMIN SERPL-MCNC: 4.1 G/DL (ref 3.5–5.2)
ALBUMIN/GLOB SERPL: 1.6 G/DL
ALP SERPL-CCNC: 96 U/L (ref 39–117)
ALT SERPL W P-5'-P-CCNC: 28 U/L (ref 1–33)
ANION GAP SERPL CALCULATED.3IONS-SCNC: 9.2 MMOL/L (ref 5–15)
AST SERPL-CCNC: 30 U/L (ref 1–32)
BASOPHILS # BLD AUTO: 0.13 10*3/MM3 (ref 0–0.2)
BASOPHILS NFR BLD AUTO: 1.9 % (ref 0–1.5)
BILIRUB SERPL-MCNC: 0.2 MG/DL (ref 0–1.2)
BUN SERPL-MCNC: 19 MG/DL (ref 8–23)
BUN/CREAT SERPL: 30.6 (ref 7–25)
CALCIUM SPEC-SCNC: 8.5 MG/DL (ref 8.6–10.5)
CHLORIDE SERPL-SCNC: 99 MMOL/L (ref 98–107)
CO2 SERPL-SCNC: 30.8 MMOL/L (ref 22–29)
CREAT SERPL-MCNC: 0.62 MG/DL (ref 0.57–1)
DEPRECATED RDW RBC AUTO: 44.3 FL (ref 37–54)
EGFRCR SERPLBLD CKD-EPI 2021: 99 ML/MIN/1.73
EOSINOPHIL # BLD AUTO: 0.41 10*3/MM3 (ref 0–0.4)
EOSINOPHIL NFR BLD AUTO: 6 % (ref 0.3–6.2)
ERYTHROCYTE [DISTWIDTH] IN BLOOD BY AUTOMATED COUNT: 13.1 % (ref 12.3–15.4)
FERRITIN SERPL-MCNC: 83.6 NG/ML (ref 13–150)
GLOBULIN UR ELPH-MCNC: 2.6 GM/DL
GLUCOSE SERPL-MCNC: 113 MG/DL (ref 65–99)
HCT VFR BLD AUTO: 32.1 % (ref 34–46.6)
HGB BLD-MCNC: 11.4 G/DL (ref 12–15.9)
IGA1 MFR SER: 203 MG/DL (ref 70–400)
IGG1 SER-MCNC: 758 MG/DL (ref 700–1600)
IGM SERPL-MCNC: 48 MG/DL (ref 40–230)
IMM GRANULOCYTES # BLD AUTO: 0.01 10*3/MM3 (ref 0–0.05)
IMM GRANULOCYTES NFR BLD AUTO: 0.1 % (ref 0–0.5)
IRON 24H UR-MRATE: 63 MCG/DL (ref 37–145)
IRON SATN MFR SERPL: 18 % (ref 20–50)
LYMPHOCYTES # BLD AUTO: 2.12 10*3/MM3 (ref 0.7–3.1)
LYMPHOCYTES NFR BLD AUTO: 30.9 % (ref 19.6–45.3)
MCH RBC QN AUTO: 33 PG (ref 26.6–33)
MCHC RBC AUTO-ENTMCNC: 35.5 G/DL (ref 31.5–35.7)
MCV RBC AUTO: 93 FL (ref 79–97)
MONOCYTES # BLD AUTO: 0.45 10*3/MM3 (ref 0.1–0.9)
MONOCYTES NFR BLD AUTO: 6.6 % (ref 5–12)
NEUTROPHILS NFR BLD AUTO: 3.74 10*3/MM3 (ref 1.7–7)
NEUTROPHILS NFR BLD AUTO: 54.5 % (ref 42.7–76)
NRBC BLD AUTO-RTO: 0 /100 WBC (ref 0–0.2)
PLATELET # BLD AUTO: 381 10*3/MM3 (ref 140–450)
PMV BLD AUTO: 9.5 FL (ref 6–12)
POTASSIUM SERPL-SCNC: 3.4 MMOL/L (ref 3.5–5.2)
PROT SERPL-MCNC: 6.7 G/DL (ref 6–8.5)
RBC # BLD AUTO: 3.45 10*6/MM3 (ref 3.77–5.28)
SODIUM SERPL-SCNC: 139 MMOL/L (ref 136–145)
TIBC SERPL-MCNC: 343 MCG/DL (ref 298–536)
TRANSFERRIN SERPL-MCNC: 230 MG/DL (ref 200–360)
WBC NRBC COR # BLD AUTO: 6.86 10*3/MM3 (ref 3.4–10.8)

## 2023-12-08 PROCEDURE — 80053 COMPREHEN METABOLIC PANEL: CPT | Performed by: INTERNAL MEDICINE

## 2023-12-08 PROCEDURE — 82728 ASSAY OF FERRITIN: CPT | Performed by: INTERNAL MEDICINE

## 2023-12-08 PROCEDURE — 85025 COMPLETE CBC W/AUTO DIFF WBC: CPT | Performed by: INTERNAL MEDICINE

## 2023-12-08 PROCEDURE — 25010000002 IMMUNE GLOBULIN (HUMAN) 10 GM/100ML SOLUTION: Performed by: INTERNAL MEDICINE

## 2023-12-08 PROCEDURE — 84466 ASSAY OF TRANSFERRIN: CPT | Performed by: INTERNAL MEDICINE

## 2023-12-08 PROCEDURE — 25010000002 HEPARIN LOCK FLUSH PER 10 UNITS: Performed by: NURSE PRACTITIONER

## 2023-12-08 PROCEDURE — 96365 THER/PROPH/DIAG IV INF INIT: CPT

## 2023-12-08 PROCEDURE — 63710000001 DIPHENHYDRAMINE PER 50 MG: Performed by: INTERNAL MEDICINE

## 2023-12-08 PROCEDURE — 25810000003 SODIUM CHLORIDE 0.9 % SOLUTION: Performed by: INTERNAL MEDICINE

## 2023-12-08 PROCEDURE — 82784 ASSAY IGA/IGD/IGG/IGM EACH: CPT | Performed by: INTERNAL MEDICINE

## 2023-12-08 PROCEDURE — 83540 ASSAY OF IRON: CPT | Performed by: INTERNAL MEDICINE

## 2023-12-08 RX ORDER — ACETAMINOPHEN 325 MG/1
650 TABLET ORAL ONCE
OUTPATIENT
Start: 2024-02-09

## 2023-12-08 RX ORDER — FAMOTIDINE 10 MG/ML
20 INJECTION, SOLUTION INTRAVENOUS AS NEEDED
OUTPATIENT
Start: 2024-03-01

## 2023-12-08 RX ORDER — SODIUM CHLORIDE 9 MG/ML
250 INJECTION, SOLUTION INTRAVENOUS ONCE
OUTPATIENT
Start: 2024-01-19

## 2023-12-08 RX ORDER — DICYCLOMINE HYDROCHLORIDE 10 MG/1
CAPSULE ORAL
COMMUNITY
Start: 2023-11-06

## 2023-12-08 RX ORDER — HEPARIN SODIUM (PORCINE) LOCK FLUSH IV SOLN 100 UNIT/ML 100 UNIT/ML
500 SOLUTION INTRAVENOUS AS NEEDED
Status: DISCONTINUED | OUTPATIENT
Start: 2023-12-08 | End: 2023-12-08 | Stop reason: HOSPADM

## 2023-12-08 RX ORDER — FAMOTIDINE 10 MG/ML
20 INJECTION, SOLUTION INTRAVENOUS AS NEEDED
OUTPATIENT
Start: 2023-12-29

## 2023-12-08 RX ORDER — SODIUM CHLORIDE 9 MG/ML
250 INJECTION, SOLUTION INTRAVENOUS ONCE
OUTPATIENT
Start: 2023-12-29

## 2023-12-08 RX ORDER — FAMOTIDINE 10 MG/ML
20 INJECTION, SOLUTION INTRAVENOUS AS NEEDED
OUTPATIENT
Start: 2024-02-09

## 2023-12-08 RX ORDER — SODIUM CHLORIDE 9 MG/ML
250 INJECTION, SOLUTION INTRAVENOUS ONCE
OUTPATIENT
Start: 2024-03-01

## 2023-12-08 RX ORDER — DIPHENHYDRAMINE HYDROCHLORIDE 50 MG/ML
50 INJECTION INTRAMUSCULAR; INTRAVENOUS AS NEEDED
OUTPATIENT
Start: 2024-02-09

## 2023-12-08 RX ORDER — FAMOTIDINE 10 MG/ML
20 INJECTION, SOLUTION INTRAVENOUS AS NEEDED
Status: CANCELLED | OUTPATIENT
Start: 2023-12-08

## 2023-12-08 RX ORDER — DIPHENHYDRAMINE HYDROCHLORIDE 50 MG/ML
50 INJECTION INTRAMUSCULAR; INTRAVENOUS AS NEEDED
OUTPATIENT
Start: 2024-01-19

## 2023-12-08 RX ORDER — DIPHENHYDRAMINE HYDROCHLORIDE 50 MG/ML
50 INJECTION INTRAMUSCULAR; INTRAVENOUS AS NEEDED
OUTPATIENT
Start: 2024-03-01

## 2023-12-08 RX ORDER — SODIUM CHLORIDE 9 MG/ML
250 INJECTION, SOLUTION INTRAVENOUS ONCE
Status: COMPLETED | OUTPATIENT
Start: 2023-12-08 | End: 2023-12-08

## 2023-12-08 RX ORDER — SODIUM CHLORIDE 0.9 % (FLUSH) 0.9 %
10 SYRINGE (ML) INJECTION AS NEEDED
OUTPATIENT
Start: 2023-12-08

## 2023-12-08 RX ORDER — DIPHENHYDRAMINE HYDROCHLORIDE 50 MG/ML
50 INJECTION INTRAMUSCULAR; INTRAVENOUS AS NEEDED
Status: CANCELLED | OUTPATIENT
Start: 2023-12-08

## 2023-12-08 RX ORDER — DIPHENHYDRAMINE HCL 25 MG
25 CAPSULE ORAL ONCE
OUTPATIENT
Start: 2024-01-19

## 2023-12-08 RX ORDER — SODIUM CHLORIDE 0.9 % (FLUSH) 0.9 %
10 SYRINGE (ML) INJECTION AS NEEDED
Status: DISCONTINUED | OUTPATIENT
Start: 2023-12-08 | End: 2023-12-08 | Stop reason: HOSPADM

## 2023-12-08 RX ORDER — DIPHENHYDRAMINE HCL 25 MG
25 CAPSULE ORAL ONCE
OUTPATIENT
Start: 2023-12-29

## 2023-12-08 RX ORDER — DESVENLAFAXINE SUCCINATE 50 MG/1
50 TABLET, EXTENDED RELEASE ORAL DAILY
COMMUNITY

## 2023-12-08 RX ORDER — DIPHENHYDRAMINE HYDROCHLORIDE 50 MG/ML
50 INJECTION INTRAMUSCULAR; INTRAVENOUS AS NEEDED
OUTPATIENT
Start: 2023-12-29

## 2023-12-08 RX ORDER — LEVOTHYROXINE SODIUM 137 UG/1
1 CAPSULE ORAL DAILY
COMMUNITY
Start: 2023-08-31

## 2023-12-08 RX ORDER — DIPHENHYDRAMINE HCL 25 MG
25 CAPSULE ORAL ONCE
OUTPATIENT
Start: 2024-03-01

## 2023-12-08 RX ORDER — ACETAMINOPHEN 325 MG/1
650 TABLET ORAL ONCE
OUTPATIENT
Start: 2024-03-01

## 2023-12-08 RX ORDER — ACETAMINOPHEN 325 MG/1
650 TABLET ORAL ONCE
Status: COMPLETED | OUTPATIENT
Start: 2023-12-08 | End: 2023-12-08

## 2023-12-08 RX ORDER — DIPHENHYDRAMINE HCL 25 MG
25 CAPSULE ORAL ONCE
Status: COMPLETED | OUTPATIENT
Start: 2023-12-08 | End: 2023-12-08

## 2023-12-08 RX ORDER — ACETAMINOPHEN 325 MG/1
650 TABLET ORAL ONCE
OUTPATIENT
Start: 2023-12-29

## 2023-12-08 RX ORDER — FAMOTIDINE 10 MG/ML
20 INJECTION, SOLUTION INTRAVENOUS AS NEEDED
OUTPATIENT
Start: 2024-01-19

## 2023-12-08 RX ORDER — ACETAMINOPHEN 325 MG/1
650 TABLET ORAL ONCE
OUTPATIENT
Start: 2024-01-19

## 2023-12-08 RX ORDER — INFLIXIMAB 100 MG/1
INJECTION, POWDER, LYOPHILIZED, FOR SOLUTION INTRAVENOUS
COMMUNITY

## 2023-12-08 RX ORDER — SODIUM CHLORIDE 9 MG/ML
250 INJECTION, SOLUTION INTRAVENOUS ONCE
OUTPATIENT
Start: 2024-02-09

## 2023-12-08 RX ORDER — HEPARIN SODIUM (PORCINE) LOCK FLUSH IV SOLN 100 UNIT/ML 100 UNIT/ML
500 SOLUTION INTRAVENOUS AS NEEDED
OUTPATIENT
Start: 2023-12-08

## 2023-12-08 RX ORDER — DIPHENHYDRAMINE HCL 25 MG
25 CAPSULE ORAL ONCE
OUTPATIENT
Start: 2024-02-09

## 2023-12-08 RX ADMIN — SODIUM CHLORIDE 250 ML: 9 INJECTION, SOLUTION INTRAVENOUS at 11:56

## 2023-12-08 RX ADMIN — DIPHENHYDRAMINE HYDROCHLORIDE 25 MG: 25 CAPSULE ORAL at 11:41

## 2023-12-08 RX ADMIN — ACETAMINOPHEN 650 MG: 325 TABLET ORAL at 11:41

## 2023-12-08 RX ADMIN — IMMUNE GLOBULIN (HUMAN) 10 G: 10 INJECTION INTRAVENOUS; SUBCUTANEOUS at 12:00

## 2023-12-08 RX ADMIN — Medication 500 UNITS: at 13:19

## 2023-12-08 RX ADMIN — Medication 10 ML: at 13:19

## 2023-12-08 NOTE — PROGRESS NOTES
Subjective     REASON FOR FOLLOW UP:    Hypogammaglobulinemia; receiving Gamunex infusions every 2 weeks  2.  Iron deficiency anemia vs anemia of chronic disease.  Her anemia has improved significantly with oral iron supplementation.  3.  Aberrant T-cell population with absent CD7  4.  Reactive leukocytosis  5.  Reactive thrombocytosis  6.  Rheumatoid arthritis currently on methotrexate and followed by Dr. Ofelia Tinajero    HISTORY OF PRESENT ILLNESS:  The patient is a 63 y.o. year old female who was referred to us from her allergy/immunology office due to a finding of hypogammaglobulinemia and iron deficiency.  She has been experiencing recurring episodes of bronchitis/pneumonia and is also being followed by Dr. Julian Funez of pulmonary medicine.    As part of her immunology work-up she had T and B cell immunophenotyping performed which showed no obvious monoclonal process but did show an aberrant T-cell population with absent CD7.  The pathologist commented that this could be seen with reactive or neoplastic processes.    She also was noted to be anemic with iron studies that were somewhat ambiguous with normal ferritin level but a decreased iron saturation and normal TIBC.  It is unclear at this time whether this is most consistent with iron deficiency or anemia of chronic disease.    Her blood count in the office today shows an elevated white count with a preponderance of mature neutrophils and normal absolute lymphocytes.  Her hemoglobin was slightly better at 11.4 g/dL compared to a hemoglobin of 10.5 with elevated platelets of 563,000 on her labs from 8/15/2022.  Platelet count today is within normal limits at 434,000.      She has been taking an iron supplement once daily with improvement in her hemoglobin.     She tells me that she has had chronic back problems and has had 19 separate back related surgeries.  After the most recent surgery she coded and had to be resuscitated.  She reports that most of her  health issues have developed since that episode.    She was evaluated by rheumatology and diagnosed with rheumatoid arthritis.  She initially was given steroid medication but now is going to be taking weekly methotrexate under the direction of Dr. Ofelia Tinajero    She was referred to family allergy and ENT and was started on Gamunex infusions every 2 weeks by Dr. Fontanez.  He is now retiring and she is asking us to take over the infusions.    INTERVAL HISTORY:  She had her IVIG schedule modified on her last visit to every 3-week infusion.  She continues to have a normal IgG trough level on the new schedule and for now we plan to continue her infusions every 3 weeks.    She reports that she did have 1 episode of bronchitis that was treated with antibiotic therapy by her pulmonologist Dr. Julian Funez.    History of Present Illness     Past Medical History:   Diagnosis Date    Anemia     Bone infection     DDD (degenerative disc disease), lumbar     Depression     Disease of thyroid gland     H/O transfusion of packed red blood cells     Hypertension     Neuropathy         Past Surgical History:   Procedure Laterality Date    APPENDECTOMY      BREAST SURGERY      BUNIONECTOMY  2017    CATARACT EXTRACTION Bilateral 2014    CHOLECYSTECTOMY      COLONOSCOPY  2011    DILATATION AND CURETTAGE      EYE SURGERY      HAMMER TOE REPAIR      LUMBAR SPINE SURGERY      NECK SURGERY  2021    decompression fusion C3-C4, C6-C7    THYROID SURGERY      TUBAL ABDOMINAL LIGATION          Current Outpatient Medications on File Prior to Visit   Medication Sig Dispense Refill    albuterol sulfate  (90 Base) MCG/ACT inhaler Inhale 2 puffs.      ARIPiprazole (ABILIFY) 5 MG tablet       calcium citrate-vitamin d (CALCITRATE) 315-250 MG-UNIT tablet tablet Take  by mouth.      cefdinir (OMNICEF) 300 MG capsule Take 1 capsule by mouth 2 (Two) Times a Day.      celecoxib (CeleBREX) 200 MG capsule Take 2 capsules by mouth 2 (Two) Times a Day.       clonazePAM (KlonoPIN) 0.5 MG tablet       cyclobenzaprine (FLEXERIL) 10 MG tablet Take 1 tablet by mouth 3 (Three) Times a Day As Needed for Muscle Spasms.      desvenlafaxine (PRISTIQ) 50 MG 24 hr tablet Take 1 tablet by mouth Daily.      dicyclomine (BENTYL) 10 MG capsule TAKE 1 CAPSULE BY MOUTH EVERY DAY AS NEEDED FOR ABDOMINAL CRAMP. TAKE WITH MEALS AND AT BEDTIME.      doxepin (SINEquan) 50 MG capsule Take 1 capsule by mouth Every Night.      DULoxetine (CYMBALTA) 60 MG capsule Take  by mouth.      DULoxetine (CYMBALTA) 60 MG capsule Take 1 capsule by mouth 2 (Two) Times a Day.      esomeprazole (nexIUM) 20 MG capsule Take 1 capsule by mouth.      FeroSul 325 (65 Fe) MG tablet TAKE 1 TABLET BY MOUTH EVERY DAY WITH BREAKFAST 30 tablet 1    ferrous gluconate (FERGON) 324 MG tablet TAKE 1 TABLET BY MOUTH DAILY 30 tablet 2    folic acid (FOLVITE) 1 MG tablet Take 2 tablets by mouth Daily.      furosemide (LASIX) 40 MG tablet Take 1 tablet by mouth 2 (Two) Times a Day.      gabapentin (NEURONTIN) 800 MG tablet Take 1 tablet by mouth 3 (Three) Times a Day.      hydrochlorothiazide (HYDRODIURIL) 25 MG tablet Take 1 tablet by mouth Daily.      HYDROcodone-acetaminophen (NORCO) 5-325 MG per tablet Take 1 tablet by mouth Every 4 (Four) Hours As Needed.      immune globulin, human, (GAMUNEX-C) 20 GM/200ML solution infusion Inject 20 g as directed See Admin Instructions.      inFLIXimab-abda (Renflexis) 100 MG injection Infuse  into a venous catheter.      lidocaine-prilocaine (EMLA) 2.5-2.5 % cream Apply to port site 1 hour prior to accessing port 30 g 0    methotrexate 2.5 MG tablet Take 8 tablets by mouth 1 (One) Time Per Week.      multivitamin (THERAGRAN) tablet tablet Take  by mouth.      ondansetron (ZOFRAN) 4 MG tablet TAKE 1 TABLET BY MOUTH EVERY 12 HOURS AS NEEDED FOR NAUSEA      oxyCODONE-acetaminophen (PERCOCET)  MG per tablet       potassium chloride (K-DUR,KLOR-CON) 20 MEQ CR tablet Take 1 tablet  "by mouth 2 (Two) Times a Day.      saccharomyces boulardii (FLORASTOR) 250 MG capsule Take 1 capsule by mouth.      Tirosint 137 MCG capsule Take 1 capsule by mouth Daily.      Ventolin  (90 Base) MCG/ACT inhaler       Xiidra 5 % ophthalmic solution       zolpidem (AMBIEN) 10 MG tablet Take 1 tablet by mouth At Night As Needed for Sleep.      Tirosint 150 MCG capsule       Tirosint 25 MCG capsule        No current facility-administered medications on file prior to visit.        ALLERGIES:    Allergies   Allergen Reactions    Levothyroxine Other (See Comments)     Unable to take due to NO THYROID    Diclofenac Swelling    Losartan Other (See Comments)     \"GIVES ME THE JITTERS\"  \"GIVES ME THE JITTERS\"  <paragraph>\"GIVES ME THE JITTERS\"</paragraph>      Other Unknown - Low Severity    Adhesive Tape Rash     blisters    Baclofen Other (See Comments)     Makes me crazy, jitters when mixed with other pain medications     Meperidine Hives        Social History     Socioeconomic History    Marital status:      Spouse name: J Luis   Tobacco Use    Smoking status: Never    Smokeless tobacco: Never   Substance and Sexual Activity    Alcohol use: No    Drug use: No    Sexual activity: Defer        Family History   Problem Relation Age of Onset    Hypertension Mother     Heart disease Mother     Heart failure Mother     Stroke Father     No Known Problems Sister     Heart failure Brother     No Known Problems Daughter     No Known Problems Son     Breast cancer Maternal Grandmother     No Known Problems Maternal Grandfather     No Known Problems Paternal Grandmother     Breast cancer Paternal Grandfather     No Known Problems Cousin         Review of Systems   Constitutional:  Positive for activity change and fatigue. Negative for chills and fever.   HENT:  Negative for mouth sores, trouble swallowing and voice change.    Eyes:  Negative for pain and visual disturbance.   Respiratory:  Negative for cough, " "shortness of breath and wheezing.    Cardiovascular:  Negative for chest pain and palpitations.   Gastrointestinal:  Positive for nausea. Negative for abdominal pain, constipation, diarrhea and vomiting.        She tells me she was recently diagnosed with gastroparesis   Genitourinary:  Negative for difficulty urinating, frequency and urgency.   Musculoskeletal:  Positive for arthralgias, back pain, gait problem, joint swelling and neck stiffness.   Skin:  Negative for rash.   Neurological:  Negative for dizziness, seizures, weakness and headaches.   Hematological:  Negative for adenopathy. Does not bruise/bleed easily.   Psychiatric/Behavioral:  Negative for behavioral problems and confusion. The patient is not nervous/anxious.         Objective     Vitals:    12/08/23 1115   BP: 137/80   Pulse: 91   Resp: 16   Temp: 98 °F (36.7 °C)   TempSrc: Temporal   SpO2: 95%   Weight: 71.7 kg (158 lb)   Height: 154 cm (60.63\")   PainSc:   7   PainLoc: Generalized  Comment: all over joint pain         12/8/2023    11:17 AM   Current Status   ECOG score 1       Physical Exam  Constitutional:       General: She is not in acute distress.     Appearance: She is well-developed.   HENT:      Head: Normocephalic.   Eyes:      General: No scleral icterus.     Conjunctiva/sclera: Conjunctivae normal.      Pupils: Pupils are equal, round, and reactive to light.   Neck:      Thyroid: No thyromegaly.      Vascular: No JVD.   Cardiovascular:      Rate and Rhythm: Normal rate and regular rhythm.      Heart sounds: No murmur heard.     No friction rub. No gallop.   Pulmonary:      Effort: Pulmonary effort is normal.      Breath sounds: Normal breath sounds. No wheezing or rales.   Abdominal:      General: There is no distension.      Palpations: Abdomen is soft. There is no mass.      Tenderness: There is no abdominal tenderness.   Musculoskeletal:         General: Swelling and deformity present. Normal range of motion.      Cervical back: " Normal range of motion and neck supple.      Comments: She has visible swelling and redness of the metacarpal joints bilaterally.  She is chronically stooped over due to her severe back problems.  She is ambulating with a cane today.   Lymphadenopathy:      Cervical: No cervical adenopathy.   Skin:     General: Skin is warm and dry.      Findings: No erythema or rash.   Neurological:      Mental Status: She is alert and oriented to person, place, and time.      Cranial Nerves: No cranial nerve deficit.      Deep Tendon Reflexes: Reflexes are normal and symmetric.   Psychiatric:         Behavior: Behavior normal.         Judgment: Judgment normal.           RECENT LABS:  Hematology WBC   Date Value Ref Range Status   12/08/2023 6.86 3.40 - 10.80 10*3/mm3 Final   11/13/2023 8.08 4.5 - 11.0 10*3/uL Final     RBC   Date Value Ref Range Status   12/08/2023 3.45 (L) 3.77 - 5.28 10*6/mm3 Final   11/13/2023 4.21 4.0 - 5.2 10*6/uL Final     Hemoglobin   Date Value Ref Range Status   12/08/2023 11.4 (L) 12.0 - 15.9 g/dL Final   11/13/2023 14.2 12.0 - 16.0 g/dL Final     Hematocrit   Date Value Ref Range Status   12/08/2023 32.1 (L) 34.0 - 46.6 % Final   11/13/2023 38.6 36.0 - 46.0 % Final     Platelets   Date Value Ref Range Status   12/08/2023 381 140 - 450 10*3/mm3 Final   11/13/2023 573 (H) 140 - 440 10*3/uL Final        Lab Results   Component Value Date    GLUCOSE 106 (H) 08/22/2023    BUN 33 (H) 08/22/2023    CREATININE 0.78 08/22/2023    EGFRIFAFRI >60 08/15/2022    BCR 42.3 (H) 08/22/2023    K 3.6 08/22/2023    CO2 29.0 08/22/2023    CALCIUM 8.9 08/22/2023    PROTENTOTREF 7.5 06/16/2023    ALBUMIN 4.3 06/16/2023    ALBUMIN 4.4 06/16/2023    LABIL2 1.5 06/16/2023    AST 21 06/16/2023    ALT 11 06/16/2023     Lab Results   Component Value Date    IRON 74 07/12/2023    TIBC 301 07/12/2023    FERRITIN 171.0 (H) 07/12/2023   SAT 26%    9/2/2022  TONG Direct   Negative Positive Abnormal       Lab Results   Component Value  Date    CRP 1.30 (H) 09/02/2022     Lab Results   Component Value Date    SEDRATE 32 (H) 09/02/2022               Component  Ref Range & Units 2 wk ago  (9/15/23) 4 wk ago  (9/1/23) 1 mo ago  (8/18/23) 1 mo ago  (8/4/23) 2 mo ago  (7/21/23) 2 mo ago  (7/6/23) 3 mo ago  (6/21/23)   IgG  700 - 1,600 mg/dL 1,129 996 1,052 913 1,002 820         IMAGING:  CT SCAN OF THE CHEST WITHOUT CONTRAST  7/7/2022  CONCLUSION:     1. Worsening subtotal middle lobe atelectasis.   2. Development of mild multifocal bilateral lower lobe reticulonodular pulmonary infiltrate.   3. Small but increasing pericardial thickening/effusion.   4. Chronic changes of the chest are stable including the right upper lobe lung nodule.   5. Postoperative thoracic spine.       Assessment & Plan   1.  Hypogammaglobulinemia with recurring pulmonary infections.  She is doing much better since starting on Gamunex infusions every 2 weeks.  Her allergist is retiring and she has asked us to take over the infusions.  Her IgG level has improved and at this point we will try to modify her IVIG dosing to every 3 weeks.  2.  Anemia with somewhat ambiguous iron studies.  Her hemoglobin has normalized with once daily iron supplementation.  3.  Aberrant population of CD7 negative T cells noted on T and B cell analysis.  There is no evidence of monoclonal B-cell population.  We feel this is a reactive T-cell population and not indicative of an underlying T-cell leukemia or lymphoma.  4.  Positive TONG and elevated markers of inflammation.  Patient has been evaluated by rheumatology and will be following up with Dr. Ofelia Tinajero.  She currently is on weekly methotrexate.    PLAN  1.  She will continue her iron supplement once daily for now.  2.  We will plan to continue her Gamunex infusions every 3 weeks at our [place] office.  A CBC and a trough IgG level will also be drawn with those visits.  3.  MD follow-up in 4 months with repeat labs and IVIG  4.  We will repeat  her iron studies in 3 months.  5.  She will follow-up with Dr. Ofelia Tinajero of rheumatology for her RA therapy.

## 2024-01-04 ENCOUNTER — TELEPHONE (OUTPATIENT)
Dept: ONCOLOGY | Facility: CLINIC | Age: 64
End: 2024-01-04

## 2024-01-04 NOTE — TELEPHONE ENCOUNTER
Caller: Paola Reid    Relationship: Self    Best call back number: [phone]    What is the best time to reach you: ANY.[email or web address] VM    Who are you requesting to speak with (clinical staff, provider,  specific staff member): SCHEDULING        What was the call regarding: PATIENT CALLED TO R/S HER INFUSION APPT TOMORROW. SHE NEEDS TO COME IN AT 10:30    Is it okay if the provider responds through MyChart: NO

## 2024-01-12 ENCOUNTER — INFUSION (OUTPATIENT)
Dept: ONCOLOGY | Facility: HOSPITAL | Age: 64
End: 2024-01-12
Payer: MEDICARE

## 2024-01-12 VITALS
BODY MASS INDEX: 29.83 KG/M2 | SYSTOLIC BLOOD PRESSURE: 128 MMHG | DIASTOLIC BLOOD PRESSURE: 82 MMHG | HEART RATE: 91 BPM | RESPIRATION RATE: 17 BRPM | TEMPERATURE: 98.7 F | HEIGHT: 61 IN | OXYGEN SATURATION: 96 % | WEIGHT: 158 LBS

## 2024-01-12 DIAGNOSIS — D80.1 HYPOGAMMAGLOBULINEMIA: Primary | ICD-10-CM

## 2024-01-12 DIAGNOSIS — I87.8 POOR VENOUS ACCESS: ICD-10-CM

## 2024-01-12 DIAGNOSIS — Z45.2 ENCOUNTER FOR ADJUSTMENT OR MANAGEMENT OF VASCULAR ACCESS DEVICE: ICD-10-CM

## 2024-01-12 PROCEDURE — 25010000002 IMMUNE GLOBULIN (HUMAN) 10 GM/100ML SOLUTION: Performed by: INTERNAL MEDICINE

## 2024-01-12 PROCEDURE — 96365 THER/PROPH/DIAG IV INF INIT: CPT

## 2024-01-12 PROCEDURE — 25010000002 HEPARIN LOCK FLUSH PER 10 UNITS: Performed by: NURSE PRACTITIONER

## 2024-01-12 PROCEDURE — 25810000003 SODIUM CHLORIDE 0.9 % SOLUTION: Performed by: INTERNAL MEDICINE

## 2024-01-12 PROCEDURE — 63710000001 DIPHENHYDRAMINE PER 50 MG: Performed by: INTERNAL MEDICINE

## 2024-01-12 RX ORDER — SODIUM CHLORIDE 0.9 % (FLUSH) 0.9 %
10 SYRINGE (ML) INJECTION AS NEEDED
Status: DISCONTINUED | OUTPATIENT
Start: 2024-01-12 | End: 2024-01-12 | Stop reason: HOSPADM

## 2024-01-12 RX ORDER — ACETAMINOPHEN 325 MG/1
650 TABLET ORAL ONCE
Status: COMPLETED | OUTPATIENT
Start: 2024-01-12 | End: 2024-01-12

## 2024-01-12 RX ORDER — HEPARIN SODIUM (PORCINE) LOCK FLUSH IV SOLN 100 UNIT/ML 100 UNIT/ML
500 SOLUTION INTRAVENOUS AS NEEDED
Status: DISCONTINUED | OUTPATIENT
Start: 2024-01-12 | End: 2024-01-12 | Stop reason: HOSPADM

## 2024-01-12 RX ORDER — DIPHENHYDRAMINE HCL 25 MG
25 CAPSULE ORAL ONCE
Status: COMPLETED | OUTPATIENT
Start: 2024-01-12 | End: 2024-01-12

## 2024-01-12 RX ORDER — SODIUM CHLORIDE 9 MG/ML
250 INJECTION, SOLUTION INTRAVENOUS ONCE
Status: COMPLETED | OUTPATIENT
Start: 2024-01-12 | End: 2024-01-12

## 2024-01-12 RX ORDER — SODIUM CHLORIDE 0.9 % (FLUSH) 0.9 %
10 SYRINGE (ML) INJECTION AS NEEDED
OUTPATIENT
Start: 2024-01-12

## 2024-01-12 RX ORDER — HEPARIN SODIUM (PORCINE) LOCK FLUSH IV SOLN 100 UNIT/ML 100 UNIT/ML
500 SOLUTION INTRAVENOUS AS NEEDED
OUTPATIENT
Start: 2024-01-12

## 2024-01-12 RX ADMIN — DIPHENHYDRAMINE HYDROCHLORIDE 25 MG: 25 CAPSULE ORAL at 13:25

## 2024-01-12 RX ADMIN — Medication 500 UNITS: at 14:52

## 2024-01-12 RX ADMIN — IMMUNE GLOBULIN (HUMAN) 10 G: 10 INJECTION INTRAVENOUS; SUBCUTANEOUS at 13:30

## 2024-01-12 RX ADMIN — ACETAMINOPHEN 650 MG: 325 TABLET ORAL at 13:25

## 2024-01-12 RX ADMIN — SODIUM CHLORIDE 250 ML: 9 INJECTION, SOLUTION INTRAVENOUS at 13:25

## 2024-01-12 RX ADMIN — Medication 10 ML: at 14:52

## 2024-01-12 NOTE — NURSING NOTE
Patient is tolerating IVIG without difficulty. Verbal order per Dr. Dominguez to continue IVIG as ordered.       Patient tolerated treatment without complaints. Discharged in stable condition.

## 2024-02-02 ENCOUNTER — INFUSION (OUTPATIENT)
Dept: ONCOLOGY | Facility: HOSPITAL | Age: 64
End: 2024-02-02
Payer: MEDICARE

## 2024-02-02 VITALS
RESPIRATION RATE: 15 BRPM | OXYGEN SATURATION: 97 % | HEART RATE: 85 BPM | DIASTOLIC BLOOD PRESSURE: 81 MMHG | HEIGHT: 61 IN | BODY MASS INDEX: 29.83 KG/M2 | WEIGHT: 158 LBS | SYSTOLIC BLOOD PRESSURE: 130 MMHG

## 2024-02-02 DIAGNOSIS — I87.8 POOR VENOUS ACCESS: ICD-10-CM

## 2024-02-02 DIAGNOSIS — D80.1 HYPOGAMMAGLOBULINEMIA: Primary | ICD-10-CM

## 2024-02-02 DIAGNOSIS — Z45.2 ENCOUNTER FOR ADJUSTMENT OR MANAGEMENT OF VASCULAR ACCESS DEVICE: ICD-10-CM

## 2024-02-02 LAB
BASOPHILS # BLD AUTO: 0.13 10*3/MM3 (ref 0–0.2)
BASOPHILS NFR BLD AUTO: 1.2 % (ref 0–1.5)
DEPRECATED RDW RBC AUTO: 43.1 FL (ref 37–54)
EOSINOPHIL # BLD AUTO: 0.74 10*3/MM3 (ref 0–0.4)
EOSINOPHIL NFR BLD AUTO: 6.6 % (ref 0.3–6.2)
ERYTHROCYTE [DISTWIDTH] IN BLOOD BY AUTOMATED COUNT: 12.8 % (ref 12.3–15.4)
HCT VFR BLD AUTO: 39.1 % (ref 34–46.6)
HGB BLD-MCNC: 13.5 G/DL (ref 12–15.9)
IGA1 MFR SER: 251 MG/DL (ref 70–400)
IGG1 SER-MCNC: 795 MG/DL (ref 700–1600)
IGM SERPL-MCNC: 68 MG/DL (ref 40–230)
IMM GRANULOCYTES # BLD AUTO: 0.06 10*3/MM3 (ref 0–0.05)
IMM GRANULOCYTES NFR BLD AUTO: 0.5 % (ref 0–0.5)
LYMPHOCYTES # BLD AUTO: 2.9 10*3/MM3 (ref 0.7–3.1)
LYMPHOCYTES NFR BLD AUTO: 25.7 % (ref 19.6–45.3)
MCH RBC QN AUTO: 32 PG (ref 26.6–33)
MCHC RBC AUTO-ENTMCNC: 34.5 G/DL (ref 31.5–35.7)
MCV RBC AUTO: 92.7 FL (ref 79–97)
MONOCYTES # BLD AUTO: 0.62 10*3/MM3 (ref 0.1–0.9)
MONOCYTES NFR BLD AUTO: 5.5 % (ref 5–12)
NEUTROPHILS NFR BLD AUTO: 6.84 10*3/MM3 (ref 1.7–7)
NEUTROPHILS NFR BLD AUTO: 60.5 % (ref 42.7–76)
NRBC BLD AUTO-RTO: 0 /100 WBC (ref 0–0.2)
PLATELET # BLD AUTO: 413 10*3/MM3 (ref 140–450)
PMV BLD AUTO: 9.4 FL (ref 6–12)
RBC # BLD AUTO: 4.22 10*6/MM3 (ref 3.77–5.28)
WBC NRBC COR # BLD AUTO: 11.29 10*3/MM3 (ref 3.4–10.8)

## 2024-02-02 PROCEDURE — 25010000002 HEPARIN LOCK FLUSH PER 10 UNITS: Performed by: INTERNAL MEDICINE

## 2024-02-02 PROCEDURE — 85025 COMPLETE CBC W/AUTO DIFF WBC: CPT | Performed by: INTERNAL MEDICINE

## 2024-02-02 PROCEDURE — 96365 THER/PROPH/DIAG IV INF INIT: CPT

## 2024-02-02 PROCEDURE — 25810000003 SODIUM CHLORIDE 0.9 % SOLUTION: Performed by: INTERNAL MEDICINE

## 2024-02-02 PROCEDURE — 63710000001 DIPHENHYDRAMINE PER 50 MG: Performed by: INTERNAL MEDICINE

## 2024-02-02 PROCEDURE — 82784 ASSAY IGA/IGD/IGG/IGM EACH: CPT | Performed by: INTERNAL MEDICINE

## 2024-02-02 PROCEDURE — 25010000002 IMMUNE GLOBULIN (HUMAN) 10 GM/100ML SOLUTION: Performed by: INTERNAL MEDICINE

## 2024-02-02 RX ORDER — SODIUM CHLORIDE 9 MG/ML
250 INJECTION, SOLUTION INTRAVENOUS ONCE
Status: COMPLETED | OUTPATIENT
Start: 2024-02-02 | End: 2024-02-02

## 2024-02-02 RX ORDER — HEPARIN SODIUM (PORCINE) LOCK FLUSH IV SOLN 100 UNIT/ML 100 UNIT/ML
500 SOLUTION INTRAVENOUS AS NEEDED
Status: DISCONTINUED | OUTPATIENT
Start: 2024-02-02 | End: 2024-02-02 | Stop reason: HOSPADM

## 2024-02-02 RX ORDER — ACETAMINOPHEN 325 MG/1
650 TABLET ORAL ONCE
Status: COMPLETED | OUTPATIENT
Start: 2024-02-02 | End: 2024-02-02

## 2024-02-02 RX ORDER — SODIUM CHLORIDE 0.9 % (FLUSH) 0.9 %
10 SYRINGE (ML) INJECTION AS NEEDED
OUTPATIENT
Start: 2024-02-02

## 2024-02-02 RX ORDER — DIPHENHYDRAMINE HCL 25 MG
25 CAPSULE ORAL ONCE
Status: COMPLETED | OUTPATIENT
Start: 2024-02-02 | End: 2024-02-02

## 2024-02-02 RX ORDER — SODIUM CHLORIDE 0.9 % (FLUSH) 0.9 %
10 SYRINGE (ML) INJECTION AS NEEDED
Status: DISCONTINUED | OUTPATIENT
Start: 2024-02-02 | End: 2024-02-02 | Stop reason: HOSPADM

## 2024-02-02 RX ORDER — HEPARIN SODIUM (PORCINE) LOCK FLUSH IV SOLN 100 UNIT/ML 100 UNIT/ML
500 SOLUTION INTRAVENOUS AS NEEDED
OUTPATIENT
Start: 2024-02-02

## 2024-02-02 RX ADMIN — ACETAMINOPHEN 650 MG: 325 TABLET ORAL at 13:33

## 2024-02-02 RX ADMIN — IMMUNE GLOBULIN (HUMAN) 10 G: 10 INJECTION INTRAVENOUS; SUBCUTANEOUS at 13:37

## 2024-02-02 RX ADMIN — SODIUM CHLORIDE 250 ML: 9 INJECTION, SOLUTION INTRAVENOUS at 13:36

## 2024-02-02 RX ADMIN — Medication 10 ML: at 15:01

## 2024-02-02 RX ADMIN — Medication 500 UNITS: at 15:01

## 2024-02-02 RX ADMIN — DIPHENHYDRAMINE HYDROCHLORIDE 25 MG: 25 CAPSULE ORAL at 13:34

## 2024-02-02 NOTE — NURSING NOTE
Patient is tolerating IVIG without difficulty. Verbal order per Dr. DOBBS to continue IVIG as ordered.

## 2024-02-12 RX ORDER — FERROUS GLUCONATE 324(38)MG
324 TABLET ORAL DAILY
Qty: 30 TABLET | Refills: 2 | Status: SHIPPED | OUTPATIENT
Start: 2024-02-12

## 2024-02-23 ENCOUNTER — INFUSION (OUTPATIENT)
Dept: ONCOLOGY | Facility: HOSPITAL | Age: 64
End: 2024-02-23
Payer: MEDICARE

## 2024-02-23 VITALS
DIASTOLIC BLOOD PRESSURE: 78 MMHG | RESPIRATION RATE: 15 BRPM | HEART RATE: 80 BPM | WEIGHT: 161.6 LBS | OXYGEN SATURATION: 96 % | SYSTOLIC BLOOD PRESSURE: 130 MMHG | TEMPERATURE: 98.4 F | BODY MASS INDEX: 30.51 KG/M2 | HEIGHT: 61 IN

## 2024-02-23 DIAGNOSIS — D80.1 HYPOGAMMAGLOBULINEMIA: Primary | ICD-10-CM

## 2024-02-23 DIAGNOSIS — I87.8 POOR VENOUS ACCESS: ICD-10-CM

## 2024-02-23 DIAGNOSIS — Z45.2 ENCOUNTER FOR ADJUSTMENT OR MANAGEMENT OF VASCULAR ACCESS DEVICE: ICD-10-CM

## 2024-02-23 LAB
BASOPHILS # BLD AUTO: 0.12 10*3/MM3 (ref 0–0.2)
BASOPHILS NFR BLD AUTO: 1.4 % (ref 0–1.5)
DEPRECATED RDW RBC AUTO: 44.1 FL (ref 37–54)
EOSINOPHIL # BLD AUTO: 0.78 10*3/MM3 (ref 0–0.4)
EOSINOPHIL NFR BLD AUTO: 9.4 % (ref 0.3–6.2)
ERYTHROCYTE [DISTWIDTH] IN BLOOD BY AUTOMATED COUNT: 13.6 % (ref 12.3–15.4)
HCT VFR BLD AUTO: 33.7 % (ref 34–46.6)
HGB BLD-MCNC: 11.9 G/DL (ref 12–15.9)
IGA1 MFR SER: 214 MG/DL (ref 70–400)
IGG1 SER-MCNC: 734 MG/DL (ref 700–1600)
IGM SERPL-MCNC: 87 MG/DL (ref 40–230)
IMM GRANULOCYTES # BLD AUTO: 0.02 10*3/MM3 (ref 0–0.05)
IMM GRANULOCYTES NFR BLD AUTO: 0.2 % (ref 0–0.5)
LYMPHOCYTES # BLD AUTO: 2.91 10*3/MM3 (ref 0.7–3.1)
LYMPHOCYTES NFR BLD AUTO: 35 % (ref 19.6–45.3)
MCH RBC QN AUTO: 32.3 PG (ref 26.6–33)
MCHC RBC AUTO-ENTMCNC: 35.3 G/DL (ref 31.5–35.7)
MCV RBC AUTO: 91.6 FL (ref 79–97)
MONOCYTES # BLD AUTO: 0.64 10*3/MM3 (ref 0.1–0.9)
MONOCYTES NFR BLD AUTO: 7.7 % (ref 5–12)
NEUTROPHILS NFR BLD AUTO: 3.85 10*3/MM3 (ref 1.7–7)
NEUTROPHILS NFR BLD AUTO: 46.3 % (ref 42.7–76)
NRBC BLD AUTO-RTO: 0 /100 WBC (ref 0–0.2)
PLATELET # BLD AUTO: 346 10*3/MM3 (ref 140–450)
PMV BLD AUTO: 9.2 FL (ref 6–12)
RBC # BLD AUTO: 3.68 10*6/MM3 (ref 3.77–5.28)
WBC NRBC COR # BLD AUTO: 8.32 10*3/MM3 (ref 3.4–10.8)

## 2024-02-23 PROCEDURE — 85025 COMPLETE CBC W/AUTO DIFF WBC: CPT | Performed by: INTERNAL MEDICINE

## 2024-02-23 PROCEDURE — 96366 THER/PROPH/DIAG IV INF ADDON: CPT

## 2024-02-23 PROCEDURE — 82784 ASSAY IGA/IGD/IGG/IGM EACH: CPT | Performed by: INTERNAL MEDICINE

## 2024-02-23 PROCEDURE — 25810000003 SODIUM CHLORIDE 0.9 % SOLUTION: Performed by: INTERNAL MEDICINE

## 2024-02-23 PROCEDURE — 25010000002 HEPARIN LOCK FLUSH PER 10 UNITS: Performed by: INTERNAL MEDICINE

## 2024-02-23 PROCEDURE — 96365 THER/PROPH/DIAG IV INF INIT: CPT

## 2024-02-23 PROCEDURE — 63710000001 DIPHENHYDRAMINE PER 50 MG: Performed by: INTERNAL MEDICINE

## 2024-02-23 PROCEDURE — 25010000002 IMMUNE GLOBULIN (HUMAN) 10 GM/100ML SOLUTION: Performed by: INTERNAL MEDICINE

## 2024-02-23 RX ORDER — HEPARIN SODIUM (PORCINE) LOCK FLUSH IV SOLN 100 UNIT/ML 100 UNIT/ML
500 SOLUTION INTRAVENOUS AS NEEDED
OUTPATIENT
Start: 2024-02-23

## 2024-02-23 RX ORDER — DIPHENHYDRAMINE HCL 25 MG
25 CAPSULE ORAL ONCE
Status: COMPLETED | OUTPATIENT
Start: 2024-02-23 | End: 2024-02-23

## 2024-02-23 RX ORDER — HEPARIN SODIUM (PORCINE) LOCK FLUSH IV SOLN 100 UNIT/ML 100 UNIT/ML
500 SOLUTION INTRAVENOUS AS NEEDED
Status: DISCONTINUED | OUTPATIENT
Start: 2024-02-23 | End: 2024-02-23 | Stop reason: HOSPADM

## 2024-02-23 RX ORDER — ACETAMINOPHEN 325 MG/1
650 TABLET ORAL ONCE
Status: COMPLETED | OUTPATIENT
Start: 2024-02-23 | End: 2024-02-23

## 2024-02-23 RX ORDER — SODIUM CHLORIDE 0.9 % (FLUSH) 0.9 %
10 SYRINGE (ML) INJECTION AS NEEDED
OUTPATIENT
Start: 2024-02-23

## 2024-02-23 RX ORDER — SODIUM CHLORIDE 0.9 % (FLUSH) 0.9 %
10 SYRINGE (ML) INJECTION AS NEEDED
Status: DISCONTINUED | OUTPATIENT
Start: 2024-02-23 | End: 2024-02-23 | Stop reason: HOSPADM

## 2024-02-23 RX ORDER — SODIUM CHLORIDE 9 MG/ML
250 INJECTION, SOLUTION INTRAVENOUS ONCE
Status: COMPLETED | OUTPATIENT
Start: 2024-02-23 | End: 2024-02-23

## 2024-02-23 RX ADMIN — SODIUM CHLORIDE 250 ML: 9 INJECTION, SOLUTION INTRAVENOUS at 13:20

## 2024-02-23 RX ADMIN — DIPHENHYDRAMINE HYDROCHLORIDE 25 MG: 25 CAPSULE ORAL at 13:22

## 2024-02-23 RX ADMIN — ACETAMINOPHEN 650 MG: 325 TABLET ORAL at 13:22

## 2024-02-23 RX ADMIN — Medication 10 ML: at 15:04

## 2024-02-23 RX ADMIN — Medication 500 UNITS: at 15:04

## 2024-02-23 RX ADMIN — IMMUNE GLOBULIN (HUMAN) 10 G: 10 INJECTION INTRAVENOUS; SUBCUTANEOUS at 13:27

## 2024-02-23 NOTE — NURSING NOTE
Patient is tolerating IVIG without difficulty. Verbal order per Dr. Dominguez to continue IVIG as ordered.       Patient requests to stay at 72 ml/hr and not increase to max rate. Patient voicing no complaints.     Patient tolerated treatment without complaints. Discharged in stable condition.

## 2024-03-04 ENCOUNTER — TELEPHONE (OUTPATIENT)
Dept: ONCOLOGY | Facility: CLINIC | Age: 64
End: 2024-03-04
Payer: MEDICARE

## 2024-03-18 ENCOUNTER — TELEPHONE (OUTPATIENT)
Dept: ONCOLOGY | Facility: CLINIC | Age: 64
End: 2024-03-18

## 2024-03-18 NOTE — TELEPHONE ENCOUNTER
Caller: Paola Reid    Relationship to patient: Self    Best call back number: [phone]    Type of visit: LAB, F/U, AND INFUSION    Requested date: 3/22/24 AROUND 12    If rescheduling, when is the original appointment: 3/15/24

## 2024-03-22 ENCOUNTER — INFUSION (OUTPATIENT)
Dept: ONCOLOGY | Facility: HOSPITAL | Age: 64
End: 2024-03-22
Payer: MEDICARE

## 2024-03-22 ENCOUNTER — TELEPHONE (OUTPATIENT)
Dept: ONCOLOGY | Facility: CLINIC | Age: 64
End: 2024-03-22
Payer: MEDICARE

## 2024-03-22 ENCOUNTER — OFFICE VISIT (OUTPATIENT)
Dept: ONCOLOGY | Facility: CLINIC | Age: 64
End: 2024-03-22
Payer: MEDICARE

## 2024-03-22 VITALS — SYSTOLIC BLOOD PRESSURE: 128 MMHG | HEART RATE: 83 BPM | DIASTOLIC BLOOD PRESSURE: 80 MMHG

## 2024-03-22 VITALS
TEMPERATURE: 98.5 F | DIASTOLIC BLOOD PRESSURE: 81 MMHG | WEIGHT: 165.8 LBS | HEART RATE: 96 BPM | SYSTOLIC BLOOD PRESSURE: 131 MMHG | BODY MASS INDEX: 31.3 KG/M2 | HEIGHT: 61 IN | OXYGEN SATURATION: 94 % | RESPIRATION RATE: 16 BRPM

## 2024-03-22 DIAGNOSIS — M06.9 RHEUMATOID ARTHRITIS, INVOLVING UNSPECIFIED SITE, UNSPECIFIED WHETHER RHEUMATOID FACTOR PRESENT: ICD-10-CM

## 2024-03-22 DIAGNOSIS — D80.1 HYPOGAMMAGLOBULINEMIA: Primary | ICD-10-CM

## 2024-03-22 DIAGNOSIS — D80.1 HYPOGAMMAGLOBULINEMIA: ICD-10-CM

## 2024-03-22 DIAGNOSIS — I87.8 POOR VENOUS ACCESS: ICD-10-CM

## 2024-03-22 DIAGNOSIS — R79.9 ABNORMAL FINDING OF BLOOD CHEMISTRY, UNSPECIFIED: ICD-10-CM

## 2024-03-22 DIAGNOSIS — Z45.2 ENCOUNTER FOR ADJUSTMENT OR MANAGEMENT OF VASCULAR ACCESS DEVICE: ICD-10-CM

## 2024-03-22 DIAGNOSIS — D64.9 ANEMIA, UNSPECIFIED TYPE: ICD-10-CM

## 2024-03-22 LAB
ALBUMIN SERPL-MCNC: 4 G/DL (ref 3.5–5.2)
ALBUMIN/GLOB SERPL: 1.5 G/DL
ALP SERPL-CCNC: 71 U/L (ref 39–117)
ALT SERPL W P-5'-P-CCNC: 41 U/L (ref 1–33)
ANION GAP SERPL CALCULATED.3IONS-SCNC: 10.3 MMOL/L (ref 5–15)
AST SERPL-CCNC: 43 U/L (ref 1–32)
BASOPHILS # BLD AUTO: 0.11 10*3/MM3 (ref 0–0.2)
BASOPHILS NFR BLD AUTO: 0.8 % (ref 0–1.5)
BILIRUB SERPL-MCNC: 0.2 MG/DL (ref 0–1.2)
BUN SERPL-MCNC: 27 MG/DL (ref 8–23)
BUN/CREAT SERPL: 30.3 (ref 7–25)
CALCIUM SPEC-SCNC: 9.6 MG/DL (ref 8.6–10.5)
CHLORIDE SERPL-SCNC: 98 MMOL/L (ref 98–107)
CO2 SERPL-SCNC: 30.7 MMOL/L (ref 22–29)
CREAT SERPL-MCNC: 0.89 MG/DL (ref 0.57–1)
DEPRECATED RDW RBC AUTO: 43.8 FL (ref 37–54)
EGFRCR SERPLBLD CKD-EPI 2021: 72.1 ML/MIN/1.73
EOSINOPHIL # BLD AUTO: 0.69 10*3/MM3 (ref 0–0.4)
EOSINOPHIL NFR BLD AUTO: 5.2 % (ref 0.3–6.2)
ERYTHROCYTE [DISTWIDTH] IN BLOOD BY AUTOMATED COUNT: 12.8 % (ref 12.3–15.4)
FERRITIN SERPL-MCNC: 64.3 NG/ML (ref 13–150)
GLOBULIN UR ELPH-MCNC: 2.7 GM/DL
GLUCOSE SERPL-MCNC: 100 MG/DL (ref 65–99)
HCT VFR BLD AUTO: 32.6 % (ref 34–46.6)
HGB BLD-MCNC: 11.4 G/DL (ref 12–15.9)
IGA1 MFR SER: 202 MG/DL (ref 70–400)
IGG1 SER-MCNC: 637 MG/DL (ref 700–1600)
IGM SERPL-MCNC: 83 MG/DL (ref 40–230)
IMM GRANULOCYTES # BLD AUTO: 0.05 10*3/MM3 (ref 0–0.05)
IMM GRANULOCYTES NFR BLD AUTO: 0.4 % (ref 0–0.5)
IRON 24H UR-MRATE: 99 MCG/DL (ref 37–145)
IRON SATN MFR SERPL: 26 % (ref 20–50)
LYMPHOCYTES # BLD AUTO: 3.86 10*3/MM3 (ref 0.7–3.1)
LYMPHOCYTES NFR BLD AUTO: 29 % (ref 19.6–45.3)
MCH RBC QN AUTO: 32.6 PG (ref 26.6–33)
MCHC RBC AUTO-ENTMCNC: 35 G/DL (ref 31.5–35.7)
MCV RBC AUTO: 93.1 FL (ref 79–97)
MONOCYTES # BLD AUTO: 0.83 10*3/MM3 (ref 0.1–0.9)
MONOCYTES NFR BLD AUTO: 6.2 % (ref 5–12)
NEUTROPHILS NFR BLD AUTO: 58.4 % (ref 42.7–76)
NEUTROPHILS NFR BLD AUTO: 7.78 10*3/MM3 (ref 1.7–7)
NRBC BLD AUTO-RTO: 0 /100 WBC (ref 0–0.2)
PLATELET # BLD AUTO: 338 10*3/MM3 (ref 140–450)
PMV BLD AUTO: 9.6 FL (ref 6–12)
POTASSIUM SERPL-SCNC: 3.4 MMOL/L (ref 3.5–5.2)
PROT SERPL-MCNC: 6.7 G/DL (ref 6–8.5)
RBC # BLD AUTO: 3.5 10*6/MM3 (ref 3.77–5.28)
SODIUM SERPL-SCNC: 139 MMOL/L (ref 136–145)
TIBC SERPL-MCNC: 375 MCG/DL (ref 298–536)
TRANSFERRIN SERPL-MCNC: 252 MG/DL (ref 200–360)
WBC NRBC COR # BLD AUTO: 13.32 10*3/MM3 (ref 3.4–10.8)

## 2024-03-22 PROCEDURE — 83540 ASSAY OF IRON: CPT | Performed by: INTERNAL MEDICINE

## 2024-03-22 PROCEDURE — 84466 ASSAY OF TRANSFERRIN: CPT | Performed by: INTERNAL MEDICINE

## 2024-03-22 PROCEDURE — 25810000003 SODIUM CHLORIDE 0.9 % SOLUTION: Performed by: INTERNAL MEDICINE

## 2024-03-22 PROCEDURE — 63710000001 DIPHENHYDRAMINE PER 50 MG: Performed by: INTERNAL MEDICINE

## 2024-03-22 PROCEDURE — 96366 THER/PROPH/DIAG IV INF ADDON: CPT

## 2024-03-22 PROCEDURE — 82728 ASSAY OF FERRITIN: CPT | Performed by: INTERNAL MEDICINE

## 2024-03-22 PROCEDURE — 82784 ASSAY IGA/IGD/IGG/IGM EACH: CPT | Performed by: INTERNAL MEDICINE

## 2024-03-22 PROCEDURE — 96365 THER/PROPH/DIAG IV INF INIT: CPT

## 2024-03-22 PROCEDURE — 25010000002 HEPARIN LOCK FLUSH PER 10 UNITS: Performed by: INTERNAL MEDICINE

## 2024-03-22 PROCEDURE — 85025 COMPLETE CBC W/AUTO DIFF WBC: CPT | Performed by: INTERNAL MEDICINE

## 2024-03-22 PROCEDURE — 80053 COMPREHEN METABOLIC PANEL: CPT | Performed by: INTERNAL MEDICINE

## 2024-03-22 PROCEDURE — 25010000002 IMMUNE GLOBULIN (HUMAN) 10 GM/100ML SOLUTION: Performed by: INTERNAL MEDICINE

## 2024-03-22 RX ORDER — SODIUM CHLORIDE 0.9 % (FLUSH) 0.9 %
10 SYRINGE (ML) INJECTION AS NEEDED
Status: DISCONTINUED | OUTPATIENT
Start: 2024-03-22 | End: 2024-03-22 | Stop reason: HOSPADM

## 2024-03-22 RX ORDER — SODIUM CHLORIDE 9 MG/ML
250 INJECTION, SOLUTION INTRAVENOUS ONCE
Status: COMPLETED | OUTPATIENT
Start: 2024-03-22 | End: 2024-03-22

## 2024-03-22 RX ORDER — SODIUM CHLORIDE 9 MG/ML
250 INJECTION, SOLUTION INTRAVENOUS ONCE
OUTPATIENT
Start: 2024-04-12

## 2024-03-22 RX ORDER — ACETAMINOPHEN 325 MG/1
650 TABLET ORAL ONCE
Status: COMPLETED | OUTPATIENT
Start: 2024-03-22 | End: 2024-03-22

## 2024-03-22 RX ORDER — ACETAMINOPHEN 325 MG/1
650 TABLET ORAL ONCE
OUTPATIENT
Start: 2024-04-12

## 2024-03-22 RX ORDER — DIPHENHYDRAMINE HCL 25 MG
25 CAPSULE ORAL ONCE
Status: COMPLETED | OUTPATIENT
Start: 2024-03-22 | End: 2024-03-22

## 2024-03-22 RX ORDER — DIPHENHYDRAMINE HYDROCHLORIDE 50 MG/ML
50 INJECTION INTRAMUSCULAR; INTRAVENOUS AS NEEDED
OUTPATIENT
Start: 2024-04-12

## 2024-03-22 RX ORDER — HEPARIN SODIUM (PORCINE) LOCK FLUSH IV SOLN 100 UNIT/ML 100 UNIT/ML
500 SOLUTION INTRAVENOUS AS NEEDED
OUTPATIENT
Start: 2024-03-22

## 2024-03-22 RX ORDER — SODIUM CHLORIDE 0.9 % (FLUSH) 0.9 %
10 SYRINGE (ML) INJECTION AS NEEDED
OUTPATIENT
Start: 2024-03-22

## 2024-03-22 RX ORDER — HEPARIN SODIUM (PORCINE) LOCK FLUSH IV SOLN 100 UNIT/ML 100 UNIT/ML
500 SOLUTION INTRAVENOUS AS NEEDED
Status: DISCONTINUED | OUTPATIENT
Start: 2024-03-22 | End: 2024-03-22 | Stop reason: HOSPADM

## 2024-03-22 RX ORDER — DIPHENHYDRAMINE HCL 25 MG
25 CAPSULE ORAL ONCE
OUTPATIENT
Start: 2024-04-12

## 2024-03-22 RX ORDER — FAMOTIDINE 10 MG/ML
20 INJECTION, SOLUTION INTRAVENOUS AS NEEDED
OUTPATIENT
Start: 2024-04-12

## 2024-03-22 RX ADMIN — DIPHENHYDRAMINE HYDROCHLORIDE 25 MG: 25 CAPSULE ORAL at 11:56

## 2024-03-22 RX ADMIN — Medication 10 ML: at 14:01

## 2024-03-22 RX ADMIN — ACETAMINOPHEN 650 MG: 325 TABLET ORAL at 11:56

## 2024-03-22 RX ADMIN — SODIUM CHLORIDE 250 ML: 9 INJECTION, SOLUTION INTRAVENOUS at 11:56

## 2024-03-22 RX ADMIN — IMMUNE GLOBULIN (HUMAN) 10 G: 10 INJECTION INTRAVENOUS; SUBCUTANEOUS at 12:25

## 2024-03-22 RX ADMIN — Medication 500 UNITS: at 14:01

## 2024-03-22 NOTE — TELEPHONE ENCOUNTER
----- Message from Morales Dominguez MD sent at 3/22/2024  1:19 PM EDT -----  Georgie,    Please let Paola know that her iron studies are in good shape and therefore she does not require any IV iron.  She can continue on her oral iron supplement.    Thanks  ----- Message -----  From: Lab, Background User  Sent: 3/22/2024  11:16 AM EDT  To: Morales Dominguez MD

## 2024-03-22 NOTE — TELEPHONE ENCOUNTER
Called pt and reviewed Dr. Dominguez's message and recommendations She v/u and had no further questions or concerns.

## 2024-03-22 NOTE — PROGRESS NOTES
Subjective     REASON FOR FOLLOW UP:    Hypogammaglobulinemia; receiving Gamunex infusions every 2 weeks  2.  Iron deficiency anemia vs anemia of chronic disease.  Her anemia improved significantly with oral iron supplementation.  3.  Aberrant T-cell population with absent CD7  4.  Reactive leukocytosis  5.  Reactive thrombocytosis  6.  Rheumatoid arthritis followed by Dr. Ofelia Tinajero    HISTORY OF PRESENT ILLNESS:  The patient is a 63 y.o. year old female who was referred to us from her allergy/immunology office due to a finding of hypogammaglobulinemia and iron deficiency.  She has been experiencing recurring episodes of bronchitis/pneumonia and is also being followed by Dr. Julian Funez of pulmonary medicine.    As part of her immunology work-up she had T and B cell immunophenotyping performed which showed no obvious monoclonal process but did show an aberrant T-cell population with absent CD7.  The pathologist commented that this could be seen with reactive or neoplastic processes.    She also was noted to be anemic with iron studies that were somewhat ambiguous with normal ferritin level but a decreased iron saturation and normal TIBC.  It is unclear at this time whether this is most consistent with iron deficiency or anemia of chronic disease.    She has been taking an iron supplement once daily with improvement in her hemoglobin.     She tells me that she has had chronic back problems and has had 19 separate back related surgeries.  After the most recent surgery she coded and had to be resuscitated.  She reports that most of her health issues have developed since that episode.    She was evaluated by rheumatology and diagnosed with rheumatoid arthritis.  She initially was given steroid medication but now is going to be taking weekly methotrexate under the direction of Dr. Ofelia Tinajero    She was referred to family allergy and ENT and was started on Gamunex infusions every 2 weeks by Dr. Fontanez.  He is now  retiring and she is asking us to take over the infusions.    INTERVAL HISTORY:  The returns today for continued IVIG infusion and follow-up of anemia.  Her hemoglobin today is down slightly at 11.4 g/dL.  She has continued to take her iron supplement daily and her repeat iron studies from today are pending.    She tells me she is having more problems with her rheumatoid arthritis and is in the process of changing her RA medication with Dr. Tinajero.    I told Paola that if her repeat iron studies today show further iron deficiency we may need to consider IV iron infusions.    History of Present Illness     Past Medical History:   Diagnosis Date    Anemia     Bone infection     DDD (degenerative disc disease), lumbar     Depression     Disease of thyroid gland     H/O transfusion of packed red blood cells     Hypertension     Neuropathy         Past Surgical History:   Procedure Laterality Date    APPENDECTOMY      BREAST SURGERY      BUNIONECTOMY  2017    CATARACT EXTRACTION Bilateral 2014    CHOLECYSTECTOMY      COLONOSCOPY  2011    DILATATION AND CURETTAGE      EYE SURGERY      HAMMER TOE REPAIR      LUMBAR SPINE SURGERY      NECK SURGERY  2021    decompression fusion C3-C4, C6-C7    THYROID SURGERY      TUBAL ABDOMINAL LIGATION          Current Outpatient Medications on File Prior to Visit   Medication Sig Dispense Refill    albuterol sulfate  (90 Base) MCG/ACT inhaler Inhale 2 puffs.      ARIPiprazole (ABILIFY) 5 MG tablet       calcium citrate-vitamin d (CALCITRATE) 315-250 MG-UNIT tablet tablet Take  by mouth.      cefdinir (OMNICEF) 300 MG capsule Take 1 capsule by mouth 2 (Two) Times a Day.      celecoxib (CeleBREX) 200 MG capsule Take 2 capsules by mouth 2 (Two) Times a Day.      clonazePAM (KlonoPIN) 0.5 MG tablet       cyclobenzaprine (FLEXERIL) 10 MG tablet Take 1 tablet by mouth 3 (Three) Times a Day As Needed for Muscle Spasms.      desvenlafaxine (PRISTIQ) 50 MG 24 hr tablet Take 1 tablet by mouth  Daily.      dicyclomine (BENTYL) 10 MG capsule TAKE 1 CAPSULE BY MOUTH EVERY DAY AS NEEDED FOR ABDOMINAL CRAMP. TAKE WITH MEALS AND AT BEDTIME.      doxepin (SINEquan) 50 MG capsule Take 1 capsule by mouth Every Night.      DULoxetine (CYMBALTA) 60 MG capsule Take  by mouth.      DULoxetine (CYMBALTA) 60 MG capsule Take 1 capsule by mouth 2 (Two) Times a Day.      esomeprazole (nexIUM) 20 MG capsule Take 1 capsule by mouth.      FeroSul 325 (65 Fe) MG tablet TAKE 1 TABLET BY MOUTH EVERY DAY WITH BREAKFAST 30 tablet 1    ferrous gluconate (FERGON) 324 MG tablet TAKE 1 TABLET BY MOUTH DAILY 30 tablet 2    folic acid (FOLVITE) 1 MG tablet Take 2 tablets by mouth Daily.      furosemide (LASIX) 40 MG tablet Take 1 tablet by mouth 2 (Two) Times a Day.      gabapentin (NEURONTIN) 800 MG tablet Take 1 tablet by mouth 3 (Three) Times a Day.      hydrochlorothiazide (HYDRODIURIL) 25 MG tablet Take 1 tablet by mouth Daily.      HYDROcodone-acetaminophen (NORCO) 5-325 MG per tablet Take 1 tablet by mouth Every 4 (Four) Hours As Needed.      immune globulin, human, (GAMUNEX-C) 20 GM/200ML solution infusion Inject 20 g as directed See Admin Instructions.      inFLIXimab-abda (Renflexis) 100 MG injection Infuse  into a venous catheter.      lidocaine-prilocaine (EMLA) 2.5-2.5 % cream Apply to port site 1 hour prior to accessing port 30 g 0    methotrexate 2.5 MG tablet Take 8 tablets by mouth 1 (One) Time Per Week.      multivitamin (THERAGRAN) tablet tablet Take  by mouth.      ondansetron (ZOFRAN) 4 MG tablet TAKE 1 TABLET BY MOUTH EVERY 12 HOURS AS NEEDED FOR NAUSEA      oxyCODONE-acetaminophen (PERCOCET)  MG per tablet       potassium chloride (K-DUR,KLOR-CON) 20 MEQ CR tablet Take 1 tablet by mouth 2 (Two) Times a Day.      saccharomyces boulardii (FLORASTOR) 250 MG capsule Take 1 capsule by mouth.      Tirosint 137 MCG capsule Take 1 capsule by mouth Daily.      Ventolin  (90 Base) MCG/ACT inhaler       Xiidra 5  "% ophthalmic solution       zolpidem (AMBIEN) 10 MG tablet Take 1 tablet by mouth At Night As Needed for Sleep.      Tirosint 150 MCG capsule  (Patient not taking: Reported on 3/22/2024)      Tirosint 25 MCG capsule  (Patient not taking: Reported on 3/22/2024)       No current facility-administered medications on file prior to visit.        ALLERGIES:    Allergies   Allergen Reactions    Levothyroxine Other (See Comments)     Unable to take due to NO THYROID    Diclofenac Swelling    Losartan Other (See Comments)     \"GIVES ME THE JITTERS\"  \"GIVES ME THE JITTERS\"  <paragraph>\"GIVES ME THE JITTERS\"</paragraph>      Other Unknown - Low Severity    Adhesive Tape Rash     blisters    Baclofen Other (See Comments)     Makes me crazy, jitters when mixed with other pain medications     Meperidine Hives        Social History     Socioeconomic History    Marital status:      Spouse name: J Luis   Tobacco Use    Smoking status: Never    Smokeless tobacco: Never   Substance and Sexual Activity    Alcohol use: No    Drug use: No    Sexual activity: Defer        Family History   Problem Relation Age of Onset    Hypertension Mother     Heart disease Mother     Heart failure Mother     Stroke Father     No Known Problems Sister     Heart failure Brother     No Known Problems Daughter     No Known Problems Son     Breast cancer Maternal Grandmother     No Known Problems Maternal Grandfather     No Known Problems Paternal Grandmother     Breast cancer Paternal Grandfather     No Known Problems Cousin         Review of Systems   Constitutional:  Positive for activity change and fatigue. Negative for chills and fever.   HENT:  Negative for mouth sores, trouble swallowing and voice change.    Eyes:  Negative for pain and visual disturbance.   Respiratory:  Negative for cough, shortness of breath and wheezing.    Cardiovascular:  Negative for chest pain and palpitations.   Gastrointestinal:  Positive for nausea. Negative for " "abdominal pain, constipation, diarrhea and vomiting.        She tells me she was recently diagnosed with gastroparesis   Genitourinary:  Negative for difficulty urinating, frequency and urgency.   Musculoskeletal:  Positive for arthralgias, back pain, gait problem, joint swelling and neck stiffness.   Skin:  Negative for rash.   Neurological:  Negative for dizziness, seizures, weakness and headaches.   Hematological:  Negative for adenopathy. Does not bruise/bleed easily.   Psychiatric/Behavioral:  Negative for behavioral problems and confusion. The patient is not nervous/anxious.         Objective     Vitals:    03/22/24 1108   BP: 131/81   Pulse: 96   Resp: 16   Temp: 98.5 °F (36.9 °C)   TempSrc: Temporal   SpO2: 94%   Weight: 75.2 kg (165 lb 12.8 oz)   Height: 154 cm (60.63\")   PainSc:   7   PainLoc: Generalized           2/23/2024     1:11 PM   Current Status   ECOG score 0       Physical Exam  Constitutional:       General: She is not in acute distress.     Appearance: She is well-developed.   HENT:      Head: Normocephalic.   Eyes:      General: No scleral icterus.     Conjunctiva/sclera: Conjunctivae normal.      Pupils: Pupils are equal, round, and reactive to light.   Neck:      Thyroid: No thyromegaly.      Vascular: No JVD.   Cardiovascular:      Rate and Rhythm: Normal rate and regular rhythm.      Heart sounds: No murmur heard.     No friction rub. No gallop.   Pulmonary:      Effort: Pulmonary effort is normal.      Breath sounds: Normal breath sounds. No wheezing or rales.   Abdominal:      General: There is no distension.      Palpations: Abdomen is soft. There is no mass.      Tenderness: There is no abdominal tenderness.   Musculoskeletal:         General: Swelling and deformity present. Normal range of motion.      Cervical back: Normal range of motion and neck supple.      Comments: She has visible swelling and redness of the metacarpal joints bilaterally.  She is chronically stooped over due to " her severe back problems.  She is ambulating with a cane today.   Lymphadenopathy:      Cervical: No cervical adenopathy.   Skin:     General: Skin is warm and dry.      Findings: No erythema or rash.   Neurological:      Mental Status: She is alert and oriented to person, place, and time.      Cranial Nerves: No cranial nerve deficit.      Deep Tendon Reflexes: Reflexes are normal and symmetric.   Psychiatric:         Behavior: Behavior normal.         Judgment: Judgment normal.           RECENT LABS:  Hematology WBC   Date Value Ref Range Status   03/22/2024 13.32 (H) 3.40 - 10.80 10*3/mm3 Final   11/13/2023 8.08 4.5 - 11.0 10*3/uL Final     RBC   Date Value Ref Range Status   03/22/2024 3.50 (L) 3.77 - 5.28 10*6/mm3 Final   11/13/2023 4.21 4.0 - 5.2 10*6/uL Final     Hemoglobin   Date Value Ref Range Status   03/22/2024 11.4 (L) 12.0 - 15.9 g/dL Final   11/13/2023 14.2 12.0 - 16.0 g/dL Final     Hematocrit   Date Value Ref Range Status   03/22/2024 32.6 (L) 34.0 - 46.6 % Final   11/13/2023 38.6 36.0 - 46.0 % Final     Platelets   Date Value Ref Range Status   03/22/2024 338 140 - 450 10*3/mm3 Final   11/13/2023 573 (H) 140 - 440 10*3/uL Final        Lab Results   Component Value Date    GLUCOSE 100 (H) 03/22/2024    BUN 27 (H) 03/22/2024    CREATININE 0.89 03/22/2024    EGFRIFAFRI >60 08/15/2022    BCR 30.3 (H) 03/22/2024    K 3.4 (L) 03/22/2024    CO2 30.7 (H) 03/22/2024    CALCIUM 9.6 03/22/2024    PROTENTOTREF 7.5 06/16/2023    ALBUMIN 4.0 03/22/2024    LABIL2 1.5 06/16/2023    AST 43 (H) 03/22/2024    ALT 41 (H) 03/22/2024     Lab Results   Component Value Date    IRON 99 03/22/2024    TIBC 375 03/22/2024    FERRITIN 64.30 03/22/2024   SAT 26%    9/2/2022  TONG Direct   Negative Positive Abnormal       Lab Results   Component Value Date    CRP 1.30 (H) 09/02/2022     Lab Results   Component Value Date    SEDRATE 32 (H) 09/02/2022     Component  Ref Range & Units 4 wk ago  (2/23/24) 1 mo ago  (2/2/24) 3 mo  ago  (12/8/23) 4 mo ago  (11/17/23) 4 mo ago  (10/27/23) 5 mo ago  (9/29/23) 6 mo ago  (9/15/23)   IgG  700 - 1,600 mg/dL 734 795 758 872 871 1,051      IMAGING:  CT SCAN OF THE CHEST WITHOUT CONTRAST  7/7/2022  CONCLUSION:     1. Worsening subtotal middle lobe atelectasis.   2. Development of mild multifocal bilateral lower lobe reticulonodular pulmonary infiltrate.   3. Small but increasing pericardial thickening/effusion.   4. Chronic changes of the chest are stable including the right upper lobe lung nodule.   5. Postoperative thoracic spine.       Assessment & Plan   1.  Hypogammaglobulinemia with recurring pulmonary infections.  She is doing much better since starting on Gamunex infusions every 2 weeks.  Her allergist is retiring and she has asked us to take over the infusions.  Her IgG level has improved and at this point we will try to modify her IVIG dosing to every 3 weeks.  2.  Anemia with somewhat ambiguous iron studies.  Her hemoglobin had normalized with once daily iron supplementation.  Her hemoglobin is decreased but her iron status seems to be okay and we suspect that she has a major component of anemia of chronic disease related to her rheumatoid arthritis with recent flare.  3.  Aberrant population of CD7 negative T cells noted on T and B cell analysis.  There is no evidence of monoclonal B-cell population.  We feel this is a reactive T-cell population and not indicative of an underlying T-cell leukemia or lymphoma.  4.  Positive TONG and elevated markers of inflammation.  Patient has been evaluated by rheumatology and will be following up with Dr. Ofelia Tinajero.       PLAN  1.  She will continue her iron supplement once daily for now.  2.  We will plan to continue her Gamunex infusions every 3 weeks at our [place] office.  A CBC and a trough IgG level will also be drawn with those visits.  3.  APRN follow up months with repeat labs and IVIG in 3 months  4.  MD follow-up with labs and IVIG in 6  months   5.  She will follow-up with Dr. Ofelia Tinajero of rheumatology for her RA therapy.

## 2024-04-15 ENCOUNTER — TELEPHONE (OUTPATIENT)
Dept: ONCOLOGY | Facility: CLINIC | Age: 64
End: 2024-04-15

## 2024-04-15 NOTE — TELEPHONE ENCOUNTER
Caller: Paola Reid    Relationship to patient: Self    Best call back number: [phone]    Type of visit: INFUSION    Requested date: PATIENT WOULD LIKE HER 4/19 APPT AND ALL FUTURE APPT SCHEDULED AT 1 PM    If rescheduling, when is the original appointment: 4/19/24

## 2024-04-16 NOTE — TELEPHONE ENCOUNTER
PATIENT CALLING BACK PLEASE R/S TO 1PM ON 4/19/2024    Caller: Paola Reid     Relationship to patient: Self     Best call back number: [phone]     Type of visit: INFUSION     Requested date: PATIENT WOULD LIKE HER 4/19 APPT AND ALL FUTURE APPT SCHEDULED AT 1 PM     If rescheduling, when is the original appointment: 4/19/24

## 2024-04-19 ENCOUNTER — INFUSION (OUTPATIENT)
Dept: ONCOLOGY | Facility: HOSPITAL | Age: 64
End: 2024-04-19
Payer: MEDICARE

## 2024-04-19 VITALS
OXYGEN SATURATION: 96 % | SYSTOLIC BLOOD PRESSURE: 118 MMHG | HEART RATE: 81 BPM | HEIGHT: 61 IN | BODY MASS INDEX: 31.79 KG/M2 | DIASTOLIC BLOOD PRESSURE: 69 MMHG | TEMPERATURE: 98.2 F | RESPIRATION RATE: 15 BRPM | WEIGHT: 168.4 LBS

## 2024-04-19 DIAGNOSIS — I87.8 POOR VENOUS ACCESS: ICD-10-CM

## 2024-04-19 DIAGNOSIS — Z45.2 ENCOUNTER FOR ADJUSTMENT OR MANAGEMENT OF VASCULAR ACCESS DEVICE: ICD-10-CM

## 2024-04-19 DIAGNOSIS — D80.1 HYPOGAMMAGLOBULINEMIA: Primary | ICD-10-CM

## 2024-04-19 LAB
BASOPHILS # BLD AUTO: 0.12 10*3/MM3 (ref 0–0.2)
BASOPHILS NFR BLD AUTO: 1.1 % (ref 0–1.5)
DEPRECATED RDW RBC AUTO: 39.5 FL (ref 37–54)
EOSINOPHIL # BLD AUTO: 0.76 10*3/MM3 (ref 0–0.4)
EOSINOPHIL NFR BLD AUTO: 6.7 % (ref 0.3–6.2)
ERYTHROCYTE [DISTWIDTH] IN BLOOD BY AUTOMATED COUNT: 11.9 % (ref 12.3–15.4)
HCT VFR BLD AUTO: 28.7 % (ref 34–46.6)
HGB BLD-MCNC: 10 G/DL (ref 12–15.9)
IGA1 MFR SER: 190 MG/DL (ref 70–400)
IGG1 SER-MCNC: 556 MG/DL (ref 700–1600)
IGM SERPL-MCNC: 61 MG/DL (ref 40–230)
IMM GRANULOCYTES # BLD AUTO: 0.02 10*3/MM3 (ref 0–0.05)
IMM GRANULOCYTES NFR BLD AUTO: 0.2 % (ref 0–0.5)
LYMPHOCYTES # BLD AUTO: 2.03 10*3/MM3 (ref 0.7–3.1)
LYMPHOCYTES NFR BLD AUTO: 18 % (ref 19.6–45.3)
MCH RBC QN AUTO: 31.9 PG (ref 26.6–33)
MCHC RBC AUTO-ENTMCNC: 34.8 G/DL (ref 31.5–35.7)
MCV RBC AUTO: 91.7 FL (ref 79–97)
MONOCYTES # BLD AUTO: 0.59 10*3/MM3 (ref 0.1–0.9)
MONOCYTES NFR BLD AUTO: 5.2 % (ref 5–12)
NEUTROPHILS NFR BLD AUTO: 68.8 % (ref 42.7–76)
NEUTROPHILS NFR BLD AUTO: 7.75 10*3/MM3 (ref 1.7–7)
NRBC BLD AUTO-RTO: 0 /100 WBC (ref 0–0.2)
PLATELET # BLD AUTO: 250 10*3/MM3 (ref 140–450)
PMV BLD AUTO: 9.2 FL (ref 6–12)
RBC # BLD AUTO: 3.13 10*6/MM3 (ref 3.77–5.28)
WBC NRBC COR # BLD AUTO: 11.27 10*3/MM3 (ref 3.4–10.8)

## 2024-04-19 PROCEDURE — 85025 COMPLETE CBC W/AUTO DIFF WBC: CPT | Performed by: INTERNAL MEDICINE

## 2024-04-19 PROCEDURE — 25010000002 IMMUNE GLOBULIN (HUMAN) 10 GM/100ML SOLUTION: Performed by: INTERNAL MEDICINE

## 2024-04-19 PROCEDURE — 63710000001 DIPHENHYDRAMINE PER 50 MG: Performed by: INTERNAL MEDICINE

## 2024-04-19 PROCEDURE — 96365 THER/PROPH/DIAG IV INF INIT: CPT

## 2024-04-19 PROCEDURE — 25010000002 HEPARIN LOCK FLUSH PER 10 UNITS: Performed by: INTERNAL MEDICINE

## 2024-04-19 PROCEDURE — 82784 ASSAY IGA/IGD/IGG/IGM EACH: CPT | Performed by: INTERNAL MEDICINE

## 2024-04-19 PROCEDURE — 25810000003 SODIUM CHLORIDE 0.9 % SOLUTION: Performed by: INTERNAL MEDICINE

## 2024-04-19 RX ORDER — HEPARIN SODIUM (PORCINE) LOCK FLUSH IV SOLN 100 UNIT/ML 100 UNIT/ML
500 SOLUTION INTRAVENOUS AS NEEDED
Status: DISCONTINUED | OUTPATIENT
Start: 2024-04-19 | End: 2024-04-19 | Stop reason: HOSPADM

## 2024-04-19 RX ORDER — DIPHENHYDRAMINE HCL 25 MG
25 CAPSULE ORAL ONCE
Status: COMPLETED | OUTPATIENT
Start: 2024-04-19 | End: 2024-04-19

## 2024-04-19 RX ORDER — SODIUM CHLORIDE 0.9 % (FLUSH) 0.9 %
10 SYRINGE (ML) INJECTION AS NEEDED
Status: DISCONTINUED | OUTPATIENT
Start: 2024-04-19 | End: 2024-04-19 | Stop reason: HOSPADM

## 2024-04-19 RX ORDER — SODIUM CHLORIDE 9 MG/ML
250 INJECTION, SOLUTION INTRAVENOUS ONCE
Status: COMPLETED | OUTPATIENT
Start: 2024-04-19 | End: 2024-04-19

## 2024-04-19 RX ORDER — HEPARIN SODIUM (PORCINE) LOCK FLUSH IV SOLN 100 UNIT/ML 100 UNIT/ML
500 SOLUTION INTRAVENOUS AS NEEDED
OUTPATIENT
Start: 2024-04-19

## 2024-04-19 RX ORDER — ACETAMINOPHEN 325 MG/1
650 TABLET ORAL ONCE
Status: COMPLETED | OUTPATIENT
Start: 2024-04-19 | End: 2024-04-19

## 2024-04-19 RX ORDER — SODIUM CHLORIDE 0.9 % (FLUSH) 0.9 %
10 SYRINGE (ML) INJECTION AS NEEDED
OUTPATIENT
Start: 2024-04-19

## 2024-04-19 RX ADMIN — ACETAMINOPHEN 650 MG: 325 TABLET ORAL at 13:58

## 2024-04-19 RX ADMIN — DIPHENHYDRAMINE HYDROCHLORIDE 25 MG: 25 CAPSULE ORAL at 13:57

## 2024-04-19 RX ADMIN — SODIUM CHLORIDE 250 ML: 9 INJECTION, SOLUTION INTRAVENOUS at 13:58

## 2024-04-19 RX ADMIN — IMMUNE GLOBULIN (HUMAN) 10 G: 10 INJECTION INTRAVENOUS; SUBCUTANEOUS at 14:24

## 2024-04-19 RX ADMIN — Medication 10 ML: at 15:48

## 2024-04-19 RX ADMIN — Medication 500 UNITS: at 15:48

## 2024-04-19 NOTE — NURSING NOTE
PT REPORTS NOT FEELING WELL TODAY. PT STATES THAT SHE STARTED A NEW DRUG PER DR. WRIGHT FOR HER RA CALLED CIMZIA. PT STATES THAT SINCE STARTING THIS NEW DRUG SHE HAS DEVELOPED CHERYL SWELLING IN LE'S. PT HAS ALSO DEVELOPED A NON PRODUCTIVE COUGH. DENIES FEVER. PT CONTINUES TO TAKE LASIX AND HCTZ. S/W SO NP AND PT TO F/U W/ HER PCP AND DR. WRIGHT. PT ADVISED TO LET DR. WRIGHT KNOW ABOUT POSSIBLE SE'S FROM NEW DRUG AND TO SEE PCP REGARDING SWELLING. PT V/U. ALSO ADVISED PT TO ELV LEGS HIGHER THAN HEART MARISA WHEN LAYING DOWN.     Patient is tolerating IVIG without difficulty. Verbal order per Dr. DOBBS to continue IVIG as ordered.

## 2024-05-13 ENCOUNTER — TELEPHONE (OUTPATIENT)
Dept: ONCOLOGY | Facility: CLINIC | Age: 64
End: 2024-05-13

## 2024-05-13 NOTE — TELEPHONE ENCOUNTER
Caller: Paola Reid    Relationship to patient: Self    Best call back number: [phone]    Chief complaint: PT CALLED TO RESCHEDULE HER INFUSION    Type of visit: INFUSION     Requested date: THIS FRIDAY

## 2024-05-17 ENCOUNTER — INFUSION (OUTPATIENT)
Dept: ONCOLOGY | Facility: HOSPITAL | Age: 64
End: 2024-05-17
Payer: MEDICARE

## 2024-05-17 VITALS
RESPIRATION RATE: 15 BRPM | HEART RATE: 81 BPM | SYSTOLIC BLOOD PRESSURE: 128 MMHG | WEIGHT: 163 LBS | TEMPERATURE: 97.1 F | BODY MASS INDEX: 30.78 KG/M2 | DIASTOLIC BLOOD PRESSURE: 71 MMHG | OXYGEN SATURATION: 97 % | HEIGHT: 61 IN

## 2024-05-17 DIAGNOSIS — I87.8 POOR VENOUS ACCESS: ICD-10-CM

## 2024-05-17 DIAGNOSIS — D80.1 HYPOGAMMAGLOBULINEMIA: Primary | ICD-10-CM

## 2024-05-17 DIAGNOSIS — Z45.2 ENCOUNTER FOR ADJUSTMENT OR MANAGEMENT OF VASCULAR ACCESS DEVICE: ICD-10-CM

## 2024-05-17 LAB
BASOPHILS # BLD AUTO: 0.15 10*3/MM3 (ref 0–0.2)
BASOPHILS NFR BLD AUTO: 1.6 % (ref 0–1.5)
DEPRECATED RDW RBC AUTO: 39.8 FL (ref 37–54)
EOSINOPHIL # BLD AUTO: 0.88 10*3/MM3 (ref 0–0.4)
EOSINOPHIL NFR BLD AUTO: 9.5 % (ref 0.3–6.2)
ERYTHROCYTE [DISTWIDTH] IN BLOOD BY AUTOMATED COUNT: 12.5 % (ref 12.3–15.4)
HCT VFR BLD AUTO: 28.9 % (ref 34–46.6)
HGB BLD-MCNC: 10.5 G/DL (ref 12–15.9)
IGA1 MFR SER: 198 MG/DL (ref 70–400)
IGG1 SER-MCNC: 651 MG/DL (ref 700–1600)
IGM SERPL-MCNC: 64 MG/DL (ref 40–230)
IMM GRANULOCYTES # BLD AUTO: 0.02 10*3/MM3 (ref 0–0.05)
IMM GRANULOCYTES NFR BLD AUTO: 0.2 % (ref 0–0.5)
LYMPHOCYTES # BLD AUTO: 2.55 10*3/MM3 (ref 0.7–3.1)
LYMPHOCYTES NFR BLD AUTO: 27.7 % (ref 19.6–45.3)
MCH RBC QN AUTO: 31.7 PG (ref 26.6–33)
MCHC RBC AUTO-ENTMCNC: 36.3 G/DL (ref 31.5–35.7)
MCV RBC AUTO: 87.3 FL (ref 79–97)
MONOCYTES # BLD AUTO: 0.62 10*3/MM3 (ref 0.1–0.9)
MONOCYTES NFR BLD AUTO: 6.7 % (ref 5–12)
NEUTROPHILS NFR BLD AUTO: 5 10*3/MM3 (ref 1.7–7)
NEUTROPHILS NFR BLD AUTO: 54.3 % (ref 42.7–76)
NRBC BLD AUTO-RTO: 0 /100 WBC (ref 0–0.2)
PLATELET # BLD AUTO: 314 10*3/MM3 (ref 140–450)
PMV BLD AUTO: 9.3 FL (ref 6–12)
RBC # BLD AUTO: 3.31 10*6/MM3 (ref 3.77–5.28)
WBC NRBC COR # BLD AUTO: 9.22 10*3/MM3 (ref 3.4–10.8)

## 2024-05-17 PROCEDURE — 25810000003 SODIUM CHLORIDE 0.9 % SOLUTION: Performed by: NURSE PRACTITIONER

## 2024-05-17 PROCEDURE — 96366 THER/PROPH/DIAG IV INF ADDON: CPT

## 2024-05-17 PROCEDURE — 63710000001 DIPHENHYDRAMINE PER 50 MG: Performed by: NURSE PRACTITIONER

## 2024-05-17 PROCEDURE — 82784 ASSAY IGA/IGD/IGG/IGM EACH: CPT | Performed by: INTERNAL MEDICINE

## 2024-05-17 PROCEDURE — 25010000002 IMMUNE GLOBULIN (HUMAN) 10 GM/100ML SOLUTION: Performed by: NURSE PRACTITIONER

## 2024-05-17 PROCEDURE — 85025 COMPLETE CBC W/AUTO DIFF WBC: CPT | Performed by: INTERNAL MEDICINE

## 2024-05-17 PROCEDURE — 25010000002 HEPARIN LOCK FLUSH PER 10 UNITS: Performed by: INTERNAL MEDICINE

## 2024-05-17 PROCEDURE — 96365 THER/PROPH/DIAG IV INF INIT: CPT

## 2024-05-17 RX ORDER — SODIUM CHLORIDE 0.9 % (FLUSH) 0.9 %
10 SYRINGE (ML) INJECTION AS NEEDED
OUTPATIENT
Start: 2024-05-17

## 2024-05-17 RX ORDER — HEPARIN SODIUM (PORCINE) LOCK FLUSH IV SOLN 100 UNIT/ML 100 UNIT/ML
500 SOLUTION INTRAVENOUS AS NEEDED
OUTPATIENT
Start: 2024-05-17

## 2024-05-17 RX ORDER — SODIUM CHLORIDE 9 MG/ML
250 INJECTION, SOLUTION INTRAVENOUS ONCE
Status: COMPLETED | OUTPATIENT
Start: 2024-05-17 | End: 2024-05-17

## 2024-05-17 RX ORDER — SODIUM CHLORIDE 0.9 % (FLUSH) 0.9 %
10 SYRINGE (ML) INJECTION AS NEEDED
Status: DISCONTINUED | OUTPATIENT
Start: 2024-05-17 | End: 2024-05-17 | Stop reason: HOSPADM

## 2024-05-17 RX ORDER — ACETAMINOPHEN 325 MG/1
650 TABLET ORAL ONCE
Status: COMPLETED | OUTPATIENT
Start: 2024-05-17 | End: 2024-05-17

## 2024-05-17 RX ORDER — HEPARIN SODIUM (PORCINE) LOCK FLUSH IV SOLN 100 UNIT/ML 100 UNIT/ML
500 SOLUTION INTRAVENOUS AS NEEDED
Status: DISCONTINUED | OUTPATIENT
Start: 2024-05-17 | End: 2024-05-17 | Stop reason: HOSPADM

## 2024-05-17 RX ORDER — DIPHENHYDRAMINE HCL 25 MG
25 CAPSULE ORAL ONCE
Status: COMPLETED | OUTPATIENT
Start: 2024-05-17 | End: 2024-05-17

## 2024-05-17 RX ADMIN — ACETAMINOPHEN 650 MG: 325 TABLET ORAL at 13:39

## 2024-05-17 RX ADMIN — IMMUNE GLOBULIN (HUMAN) 10 G: 10 INJECTION INTRAVENOUS; SUBCUTANEOUS at 14:00

## 2024-05-17 RX ADMIN — Medication 10 ML: at 15:35

## 2024-05-17 RX ADMIN — Medication 500 UNITS: at 15:35

## 2024-05-17 RX ADMIN — SODIUM CHLORIDE 250 ML: 9 INJECTION, SOLUTION INTRAVENOUS at 14:00

## 2024-05-17 RX ADMIN — DIPHENHYDRAMINE HYDROCHLORIDE 25 MG: 25 CAPSULE ORAL at 13:39

## 2024-05-17 NOTE — NURSING NOTE
Patient is tolerating IVIG without difficulty. Verbal order per Myrna Burgess, APRN, to continue IVIG as ordered.

## 2024-05-28 RX ORDER — FERROUS GLUCONATE 324(38)MG
324 TABLET ORAL DAILY
Qty: 30 TABLET | Refills: 2 | Status: SHIPPED | OUTPATIENT
Start: 2024-05-28

## 2024-06-28 ENCOUNTER — INFUSION (OUTPATIENT)
Dept: ONCOLOGY | Facility: HOSPITAL | Age: 64
End: 2024-06-28
Payer: MEDICARE

## 2024-06-28 VITALS
RESPIRATION RATE: 16 BRPM | WEIGHT: 158 LBS | HEART RATE: 96 BPM | TEMPERATURE: 98.4 F | HEIGHT: 61 IN | DIASTOLIC BLOOD PRESSURE: 68 MMHG | BODY MASS INDEX: 29.83 KG/M2 | OXYGEN SATURATION: 97 % | SYSTOLIC BLOOD PRESSURE: 110 MMHG

## 2024-06-28 DIAGNOSIS — Z45.2 ENCOUNTER FOR ADJUSTMENT OR MANAGEMENT OF VASCULAR ACCESS DEVICE: ICD-10-CM

## 2024-06-28 DIAGNOSIS — I87.8 POOR VENOUS ACCESS: ICD-10-CM

## 2024-06-28 DIAGNOSIS — D80.1 HYPOGAMMAGLOBULINEMIA: Primary | ICD-10-CM

## 2024-06-28 LAB
BASOPHILS # BLD AUTO: 0.1 10*3/MM3 (ref 0–0.2)
BASOPHILS NFR BLD AUTO: 1.4 % (ref 0–1.5)
DEPRECATED RDW RBC AUTO: 37.5 FL (ref 37–54)
EOSINOPHIL # BLD AUTO: 0.16 10*3/MM3 (ref 0–0.4)
EOSINOPHIL NFR BLD AUTO: 2.3 % (ref 0.3–6.2)
ERYTHROCYTE [DISTWIDTH] IN BLOOD BY AUTOMATED COUNT: 11.8 % (ref 12.3–15.4)
HCT VFR BLD AUTO: 32.9 % (ref 34–46.6)
HGB BLD-MCNC: 11.6 G/DL (ref 12–15.9)
IGA1 MFR SER: 265 MG/DL (ref 70–400)
IGG1 SER-MCNC: 809 MG/DL (ref 700–1600)
IGM SERPL-MCNC: 71 MG/DL (ref 40–230)
IMM GRANULOCYTES # BLD AUTO: 0.01 10*3/MM3 (ref 0–0.05)
IMM GRANULOCYTES NFR BLD AUTO: 0.1 % (ref 0–0.5)
LYMPHOCYTES # BLD AUTO: 2.09 10*3/MM3 (ref 0.7–3.1)
LYMPHOCYTES NFR BLD AUTO: 30.2 % (ref 19.6–45.3)
MCH RBC QN AUTO: 30.4 PG (ref 26.6–33)
MCHC RBC AUTO-ENTMCNC: 35.3 G/DL (ref 31.5–35.7)
MCV RBC AUTO: 86.4 FL (ref 79–97)
MONOCYTES # BLD AUTO: 0.31 10*3/MM3 (ref 0.1–0.9)
MONOCYTES NFR BLD AUTO: 4.5 % (ref 5–12)
NEUTROPHILS NFR BLD AUTO: 4.25 10*3/MM3 (ref 1.7–7)
NEUTROPHILS NFR BLD AUTO: 61.5 % (ref 42.7–76)
NRBC BLD AUTO-RTO: 0 /100 WBC (ref 0–0.2)
PLATELET # BLD AUTO: 300 10*3/MM3 (ref 140–450)
PMV BLD AUTO: 9.8 FL (ref 6–12)
RBC # BLD AUTO: 3.81 10*6/MM3 (ref 3.77–5.28)
WBC NRBC COR # BLD AUTO: 6.92 10*3/MM3 (ref 3.4–10.8)

## 2024-06-28 PROCEDURE — 96365 THER/PROPH/DIAG IV INF INIT: CPT

## 2024-06-28 PROCEDURE — 25010000002 HEPARIN LOCK FLUSH PER 10 UNITS: Performed by: INTERNAL MEDICINE

## 2024-06-28 PROCEDURE — 63710000001 DIPHENHYDRAMINE PER 50 MG: Performed by: NURSE PRACTITIONER

## 2024-06-28 PROCEDURE — 25810000003 SODIUM CHLORIDE 0.9 % SOLUTION: Performed by: NURSE PRACTITIONER

## 2024-06-28 PROCEDURE — 25010000002 IMMUNE GLOBULIN (HUMAN) 10 GM/100ML SOLUTION: Performed by: NURSE PRACTITIONER

## 2024-06-28 PROCEDURE — 85025 COMPLETE CBC W/AUTO DIFF WBC: CPT | Performed by: INTERNAL MEDICINE

## 2024-06-28 PROCEDURE — 82784 ASSAY IGA/IGD/IGG/IGM EACH: CPT | Performed by: INTERNAL MEDICINE

## 2024-06-28 RX ORDER — SODIUM CHLORIDE 0.9 % (FLUSH) 0.9 %
10 SYRINGE (ML) INJECTION AS NEEDED
Status: DISCONTINUED | OUTPATIENT
Start: 2024-06-28 | End: 2024-06-28 | Stop reason: HOSPADM

## 2024-06-28 RX ORDER — SODIUM CHLORIDE 9 MG/ML
250 INJECTION, SOLUTION INTRAVENOUS ONCE
Status: COMPLETED | OUTPATIENT
Start: 2024-06-28 | End: 2024-06-28

## 2024-06-28 RX ORDER — SODIUM CHLORIDE 0.9 % (FLUSH) 0.9 %
10 SYRINGE (ML) INJECTION AS NEEDED
OUTPATIENT
Start: 2024-06-28

## 2024-06-28 RX ORDER — ACETAMINOPHEN 325 MG/1
650 TABLET ORAL ONCE
Status: COMPLETED | OUTPATIENT
Start: 2024-06-28 | End: 2024-06-28

## 2024-06-28 RX ORDER — DIPHENHYDRAMINE HCL 25 MG
25 CAPSULE ORAL ONCE
Status: COMPLETED | OUTPATIENT
Start: 2024-06-28 | End: 2024-06-28

## 2024-06-28 RX ORDER — HEPARIN SODIUM (PORCINE) LOCK FLUSH IV SOLN 100 UNIT/ML 100 UNIT/ML
500 SOLUTION INTRAVENOUS AS NEEDED
Status: DISCONTINUED | OUTPATIENT
Start: 2024-06-28 | End: 2024-06-28 | Stop reason: HOSPADM

## 2024-06-28 RX ORDER — HEPARIN SODIUM (PORCINE) LOCK FLUSH IV SOLN 100 UNIT/ML 100 UNIT/ML
500 SOLUTION INTRAVENOUS AS NEEDED
OUTPATIENT
Start: 2024-06-28

## 2024-06-28 RX ADMIN — Medication 500 UNITS: at 14:48

## 2024-06-28 RX ADMIN — SODIUM CHLORIDE 250 ML: 9 INJECTION, SOLUTION INTRAVENOUS at 13:25

## 2024-06-28 RX ADMIN — ACETAMINOPHEN 650 MG: 325 TABLET ORAL at 13:25

## 2024-06-28 RX ADMIN — DIPHENHYDRAMINE HYDROCHLORIDE 25 MG: 25 CAPSULE ORAL at 13:25

## 2024-06-28 RX ADMIN — IMMUNE GLOBULIN (HUMAN) 10 G: 10 INJECTION INTRAVENOUS; SUBCUTANEOUS at 13:25

## 2024-06-28 RX ADMIN — Medication 10 ML: at 14:48

## 2024-06-28 NOTE — NURSING NOTE
Patient is tolerating IVIG without difficulty. Verbal order per Jerri Gleason NP to continue IVIG as ordered.

## 2024-07-19 ENCOUNTER — INFUSION (OUTPATIENT)
Dept: ONCOLOGY | Facility: HOSPITAL | Age: 64
End: 2024-07-19
Payer: MEDICARE

## 2024-07-19 ENCOUNTER — OFFICE VISIT (OUTPATIENT)
Dept: ONCOLOGY | Facility: CLINIC | Age: 64
End: 2024-07-19
Payer: MEDICARE

## 2024-07-19 VITALS
SYSTOLIC BLOOD PRESSURE: 115 MMHG | RESPIRATION RATE: 16 BRPM | HEART RATE: 84 BPM | TEMPERATURE: 98.4 F | WEIGHT: 156.4 LBS | OXYGEN SATURATION: 99 % | DIASTOLIC BLOOD PRESSURE: 79 MMHG | BODY MASS INDEX: 29.53 KG/M2 | HEIGHT: 61 IN

## 2024-07-19 DIAGNOSIS — D80.1 HYPOGAMMAGLOBULINEMIA: Primary | ICD-10-CM

## 2024-07-19 DIAGNOSIS — D80.1 HYPOGAMMAGLOBULINEMIA: ICD-10-CM

## 2024-07-19 DIAGNOSIS — D50.9 IRON DEFICIENCY ANEMIA, UNSPECIFIED IRON DEFICIENCY ANEMIA TYPE: ICD-10-CM

## 2024-07-19 PROCEDURE — 96365 THER/PROPH/DIAG IV INF INIT: CPT

## 2024-07-19 PROCEDURE — 1159F MED LIST DOCD IN RCRD: CPT | Performed by: NURSE PRACTITIONER

## 2024-07-19 PROCEDURE — 85025 COMPLETE CBC W/AUTO DIFF WBC: CPT

## 2024-07-19 PROCEDURE — 25010000002 IMMUNE GLOBULIN (HUMAN) 10 GM/100ML SOLUTION: Performed by: INTERNAL MEDICINE

## 2024-07-19 PROCEDURE — 1125F AMNT PAIN NOTED PAIN PRSNT: CPT | Performed by: NURSE PRACTITIONER

## 2024-07-19 PROCEDURE — 99214 OFFICE O/P EST MOD 30 MIN: CPT | Performed by: NURSE PRACTITIONER

## 2024-07-19 PROCEDURE — 85007 BL SMEAR W/DIFF WBC COUNT: CPT

## 2024-07-19 PROCEDURE — 82784 ASSAY IGA/IGD/IGG/IGM EACH: CPT

## 2024-07-19 PROCEDURE — 1160F RVW MEDS BY RX/DR IN RCRD: CPT | Performed by: NURSE PRACTITIONER

## 2024-07-19 PROCEDURE — 25810000003 SODIUM CHLORIDE 0.9 % SOLUTION: Performed by: INTERNAL MEDICINE

## 2024-07-19 PROCEDURE — 63710000001 DIPHENHYDRAMINE PER 50 MG: Performed by: INTERNAL MEDICINE

## 2024-07-19 RX ADMIN — DIPHENHYDRAMINE HYDROCHLORIDE 25 MG: 25 CAPSULE ORAL at 13:00

## 2024-07-19 RX ADMIN — ACETAMINOPHEN 650 MG: 325 TABLET ORAL at 13:00

## 2024-07-19 RX ADMIN — SODIUM CHLORIDE 250 ML: 0.9 INJECTION, SOLUTION INTRAVENOUS at 12:48

## 2024-07-19 RX ADMIN — IMMUNE GLOBULIN (HUMAN) 10 G: 10 INJECTION INTRAVENOUS; SUBCUTANEOUS at 12:48

## 2024-07-22 LAB
BASOPHILS # BLD AUTO: 0.09 10*3/MM3 (ref 0–0.2)
BASOPHILS NFR BLD AUTO: 8 % (ref 0–1.5)
DEPRECATED RDW RBC AUTO: 36 FL (ref 37–54)
EOSINOPHIL # BLD AUTO: 0.47 10*3/MM3 (ref 0–0.4)
EOSINOPHIL NFR BLD AUTO: 4 % (ref 0.3–6.2)
ERYTHROCYTE [DISTWIDTH] IN BLOOD BY AUTOMATED COUNT: 11.4 % (ref 12.3–15.4)
HCT VFR BLD AUTO: 32.8 % (ref 34–46.6)
HGB BLD-MCNC: 11.6 G/DL (ref 12–15.9)
IGA1 MFR SER: 279 MG/DL (ref 70–400)
IGG1 SER-MCNC: 973 MG/DL (ref 700–1600)
IGM SERPL-MCNC: 69 MG/DL (ref 40–230)
IMM GRANULOCYTES # BLD AUTO: 0.04 10*3/MM3 (ref 0–0.05)
IMM GRANULOCYTES NFR BLD AUTO: 0.3 % (ref 0–0.5)
LYMPHOCYTES # BLD AUTO: 3.07 10*3/MM3 (ref 0.7–3.1)
LYMPHOCYTES NFR BLD AUTO: 26 % (ref 19.6–45.3)
MCH RBC QN AUTO: 30.6 PG (ref 26.6–33)
MCHC RBC AUTO-ENTMCNC: 35.4 G/DL (ref 31.5–35.7)
MCV RBC AUTO: 86.5 FL (ref 79–97)
MONOCYTES # BLD AUTO: 0.61 10*3/MM3 (ref 0.1–0.9)
MONOCYTES NFR BLD AUTO: 5.2 % (ref 5–12)
NEUTROPHILS NFR BLD AUTO: 63.7 % (ref 42.7–76)
NEUTROPHILS NFR BLD AUTO: 7.55 10*3/MM3 (ref 1.7–7)
NRBC BLD AUTO-RTO: 0 /100 WBC (ref 0–0.2)
PLAT MORPH BLD: NORMAL
PLATELET # BLD AUTO: 304 10*3/MM3 (ref 140–450)
PMV BLD AUTO: 9.9 FL (ref 6–12)
RBC # BLD AUTO: 3.79 10*6/MM3 (ref 3.77–5.28)
RBC MORPH BLD: NORMAL
WBC MORPH BLD: NORMAL
WBC NRBC COR # BLD AUTO: 11.83 10*3/MM3 (ref 3.4–10.8)

## 2024-07-22 NOTE — PROGRESS NOTES
Subjective     REASON FOR FOLLOW UP:    Hypogammaglobulinemia; receiving Gamunex infusions every 2 weeks  2.  Iron deficiency anemia vs anemia of chronic disease.  Her anemia improved significantly with oral iron supplementation.  3.  Aberrant T-cell population with absent CD7  4.  Reactive leukocytosis  5.  Reactive thrombocytosis  6.  Rheumatoid arthritis followed by Dr. Ofelia Tinajero    HISTORY OF PRESENT ILLNESS:  The patient is a 63 y.o. year old female who was referred to us from her allergy/immunology office due to a finding of hypogammaglobulinemia and iron deficiency.  She has been experiencing recurring episodes of bronchitis/pneumonia and is also being followed by Dr. Julian Funez of pulmonary medicine.    As part of her immunology work-up she had T and B cell immunophenotyping performed which showed no obvious monoclonal process but did show an aberrant T-cell population with absent CD7.  The pathologist commented that this could be seen with reactive or neoplastic processes.    She also was noted to be anemic with iron studies that were somewhat ambiguous with normal ferritin level but a decreased iron saturation and normal TIBC.  It is unclear at this time whether this is most consistent with iron deficiency or anemia of chronic disease.    She has been taking an iron supplement once daily with improvement in her hemoglobin.     She tells me that she has had chronic back problems and has had 19 separate back related surgeries.  After the most recent surgery she coded and had to be resuscitated.  She reports that most of her health issues have developed since that episode.    She was evaluated by rheumatology and diagnosed with rheumatoid arthritis.  She initially was given steroid medication but now is going to be taking weekly methotrexate under the direction of Dr. Ofelia Tinajero    She was referred to family allergy and ENT and was started on Gamunex infusions every 2 weeks by Dr. Fontanez.  He is now  retiring and she is asking us to take over the infusions.    INTERVAL HISTORY:  Patient returns today 7/19/2024 for lab review and evaluation due for IVIG.  She denies issues with fevers or night sweats.  She has had a lot of fatigue as well as pain from her rheumatoid arthritis, especially in her left thumb, and right foot.  She is concerned because she has noticed some areas of bruising that seem to be spontaneous.  No other areas of bleeding.  She is also noticed some tiny subcutaneous nodules on her forearms as well as her right leg.  She continues to follow closely with Dr. Tinajero for her rheumatoid arthritis.       History of Present Illness     Past Medical History:   Diagnosis Date    Anemia     Bone infection     DDD (degenerative disc disease), lumbar     Depression     Disease of thyroid gland     H/O transfusion of packed red blood cells     Hypertension     Neuropathy         Past Surgical History:   Procedure Laterality Date    APPENDECTOMY      BREAST SURGERY      BUNIONECTOMY  2017    CATARACT EXTRACTION Bilateral 2014    CHOLECYSTECTOMY      COLONOSCOPY  2011    DILATATION AND CURETTAGE      EYE SURGERY      HAMMER TOE REPAIR      LUMBAR SPINE SURGERY      NECK SURGERY  2021    decompression fusion C3-C4, C6-C7    THYROID SURGERY      TUBAL ABDOMINAL LIGATION          Current Outpatient Medications on File Prior to Visit   Medication Sig Dispense Refill    ARIPiprazole (ABILIFY) 5 MG tablet       calcium citrate-vitamin d (CALCITRATE) 315-250 MG-UNIT tablet tablet Take  by mouth.      cefdinir (OMNICEF) 300 MG capsule Take 1 capsule by mouth 2 (Two) Times a Day.      celecoxib (CeleBREX) 200 MG capsule Take 2 capsules by mouth 2 (Two) Times a Day.      Certolizumab Pegol (CIMZIA SC) Inject  under the skin into the appropriate area as directed Every 30 (Thirty) Days.      clonazePAM (KlonoPIN) 0.5 MG tablet       cyclobenzaprine (FLEXERIL) 10 MG tablet Take 1 tablet by mouth 3 (Three) Times a Day As  Needed for Muscle Spasms.      desvenlafaxine (PRISTIQ) 50 MG 24 hr tablet Take 1 tablet by mouth Daily.      dicyclomine (BENTYL) 10 MG capsule TAKE 1 CAPSULE BY MOUTH EVERY DAY AS NEEDED FOR ABDOMINAL CRAMP. TAKE WITH MEALS AND AT BEDTIME.      doxepin (SINEquan) 50 MG capsule Take 1 capsule by mouth Every Night.      DULoxetine (CYMBALTA) 60 MG capsule Take  by mouth.      DULoxetine (CYMBALTA) 60 MG capsule Take 1 capsule by mouth 2 (Two) Times a Day.      esomeprazole (nexIUM) 20 MG capsule Take 1 capsule by mouth.      FeroSul 325 (65 Fe) MG tablet TAKE 1 TABLET BY MOUTH EVERY DAY WITH BREAKFAST 30 tablet 1    ferrous gluconate (FERGON) 324 MG tablet TAKE 1 TABLET BY MOUTH DAILY 30 tablet 2    folic acid (FOLVITE) 1 MG tablet Take 2 tablets by mouth Daily.      furosemide (LASIX) 40 MG tablet Take 1 tablet by mouth 2 (Two) Times a Day.      gabapentin (NEURONTIN) 800 MG tablet Take 1 tablet by mouth 3 (Three) Times a Day.      hydrochlorothiazide (HYDRODIURIL) 25 MG tablet Take 1 tablet by mouth Daily.      HYDROcodone-acetaminophen (NORCO) 5-325 MG per tablet Take 1 tablet by mouth Every 4 (Four) Hours As Needed.      immune globulin, human, (GAMUNEX-C) 20 GM/200ML solution infusion Inject 20 g as directed See Admin Instructions.      lidocaine-prilocaine (EMLA) 2.5-2.5 % cream Apply to port site 1 hour prior to accessing port 30 g 0    multivitamin (THERAGRAN) tablet tablet Take  by mouth.      ondansetron (ZOFRAN) 4 MG tablet TAKE 1 TABLET BY MOUTH EVERY 12 HOURS AS NEEDED FOR NAUSEA      oxyCODONE-acetaminophen (PERCOCET)  MG per tablet       potassium chloride (K-DUR,KLOR-CON) 20 MEQ CR tablet Take 1 tablet by mouth 2 (Two) Times a Day.      saccharomyces boulardii (FLORASTOR) 250 MG capsule Take 1 capsule by mouth.      Tirosint 137 MCG capsule Take 1 capsule by mouth Daily.      Ventolin  (90 Base) MCG/ACT inhaler       Xiidra 5 % ophthalmic solution       zolpidem (AMBIEN) 10 MG tablet  "Take 1 tablet by mouth At Night As Needed for Sleep.      Tirosint 150 MCG capsule  (Patient not taking: Reported on 3/22/2024)      Tirosint 25 MCG capsule  (Patient not taking: Reported on 3/22/2024)       No current facility-administered medications on file prior to visit.        ALLERGIES:    Allergies   Allergen Reactions    Levothyroxine Other (See Comments)     Unable to take due to NO THYROID    Diclofenac Swelling    Losartan Other (See Comments)     \"GIVES ME THE JITTERS\"  \"GIVES ME THE JITTERS\"  <paragraph>\"GIVES ME THE JITTERS\"</paragraph>      Other Unknown - Low Severity    Adhesive Tape Rash     blisters    Baclofen Other (See Comments)     Makes me crazy, jitters when mixed with other pain medications     Meperidine Hives        Social History     Socioeconomic History    Marital status:      Spouse name: J Luis   Tobacco Use    Smoking status: Never    Smokeless tobacco: Never   Substance and Sexual Activity    Alcohol use: No    Drug use: No    Sexual activity: Defer        Family History   Problem Relation Age of Onset    Hypertension Mother     Heart disease Mother     Heart failure Mother     Stroke Father     No Known Problems Sister     Heart failure Brother     No Known Problems Daughter     No Known Problems Son     Breast cancer Maternal Grandmother     No Known Problems Maternal Grandfather     No Known Problems Paternal Grandmother     Breast cancer Paternal Grandfather     No Known Problems Cousin         Review of Systems   Constitutional:  Positive for activity change and fatigue. Negative for chills and fever.   HENT:  Negative for mouth sores, trouble swallowing and voice change.    Eyes:  Negative for pain and visual disturbance.   Respiratory:  Negative for cough, shortness of breath and wheezing.    Cardiovascular:  Negative for chest pain and palpitations.   Gastrointestinal:  Positive for nausea. Negative for abdominal pain, constipation, diarrhea and vomiting.        She " "tells me she was recently diagnosed with gastroparesis   Genitourinary:  Negative for difficulty urinating, frequency and urgency.   Musculoskeletal:  Positive for arthralgias, back pain, gait problem, joint swelling and neck stiffness.   Skin:  Negative for rash.   Neurological:  Negative for dizziness, seizures, weakness and headaches.   Hematological:  Negative for adenopathy. Does not bruise/bleed easily.   Psychiatric/Behavioral:  Negative for behavioral problems and confusion. The patient is not nervous/anxious.         Objective     Vitals:    07/19/24 1607   BP: 115/79   Pulse: 84   Resp: 16   Temp: 98.4 °F (36.9 °C)   TempSrc: Oral   SpO2: 99%   Weight: 70.9 kg (156 lb 6.4 oz)   Height: 154 cm (60.63\")   PainSc:   7   PainLoc: Generalized  Comment: neck, having RA pain in thumb and wrist           7/19/2024     4:09 PM   Current Status   ECOG score 0       Physical Exam  Constitutional:       General: She is not in acute distress.     Appearance: She is well-developed.   HENT:      Head: Normocephalic.   Eyes:      General: No scleral icterus.     Conjunctiva/sclera: Conjunctivae normal.      Pupils: Pupils are equal, round, and reactive to light.   Neck:      Thyroid: No thyromegaly.      Vascular: No JVD.   Cardiovascular:      Rate and Rhythm: Normal rate and regular rhythm.      Heart sounds: No murmur heard.     No friction rub. No gallop.   Pulmonary:      Effort: Pulmonary effort is normal.      Breath sounds: Normal breath sounds. No wheezing or rales.   Abdominal:      General: There is no distension.      Palpations: Abdomen is soft. There is no mass.      Tenderness: There is no abdominal tenderness.   Musculoskeletal:         General: Swelling and deformity present. Normal range of motion.      Cervical back: Normal range of motion and neck supple.      Comments: She has visible swelling and redness of the metacarpal joints bilaterally.  She is chronically stooped over due to her severe back " problems.  She is ambulating with a cane today.   Lymphadenopathy:      Cervical: No cervical adenopathy.   Skin:     General: Skin is warm and dry.      Findings: No erythema or rash.      Comments: Patient has several tiny subcutaneous nodules on her left and right forearms as well as behind her right calf.  There is no tenderness.  No erythema.   Neurological:      Mental Status: She is alert and oriented to person, place, and time.      Cranial Nerves: No cranial nerve deficit.      Deep Tendon Reflexes: Reflexes are normal and symmetric.   Psychiatric:         Behavior: Behavior normal.         Judgment: Judgment normal.           RECENT LABS:  Hematology WBC   Date Value Ref Range Status   07/19/2024 11.83 (H) 3.40 - 10.80 10*3/mm3 Final   11/13/2023 8.08 4.5 - 11.0 10*3/uL Final     RBC   Date Value Ref Range Status   07/19/2024 3.79 3.77 - 5.28 10*6/mm3 Final   11/13/2023 4.21 4.0 - 5.2 10*6/uL Final     Hemoglobin   Date Value Ref Range Status   07/19/2024 11.6 (L) 12.0 - 15.9 g/dL Final   11/13/2023 14.2 12.0 - 16.0 g/dL Final     Hematocrit   Date Value Ref Range Status   07/19/2024 32.8 (L) 34.0 - 46.6 % Final   11/13/2023 38.6 36.0 - 46.0 % Final     Platelets   Date Value Ref Range Status   07/19/2024 304 140 - 450 10*3/mm3 Final   11/13/2023 573 (H) 140 - 440 10*3/uL Final        Lab Results   Component Value Date    GLUCOSE 100 (H) 03/22/2024    BUN 27 (H) 03/22/2024    CREATININE 0.89 03/22/2024    EGFRIFAFRI >60 08/15/2022    BCR 30.3 (H) 03/22/2024    K 3.4 (L) 03/22/2024    CO2 30.7 (H) 03/22/2024    CALCIUM 9.6 03/22/2024    PROTENTOTREF 7.5 06/16/2023    ALBUMIN 4.0 03/22/2024    LABIL2 1.5 06/16/2023    AST 43 (H) 03/22/2024    ALT 41 (H) 03/22/2024     Lab Results   Component Value Date    IRON 99 03/22/2024    TIBC 375 03/22/2024    FERRITIN 64.30 03/22/2024   SAT 26%    9/2/2022  TONG Direct   Negative Positive Abnormal       Lab Results   Component Value Date    CRP 1.30 (H) 09/02/2022      Lab Results   Component Value Date    SEDRATE 32 (H) 09/02/2022     Component  Ref Range & Units 4 wk ago  (2/23/24) 1 mo ago  (2/2/24) 3 mo ago  (12/8/23) 4 mo ago  (11/17/23) 4 mo ago  (10/27/23) 5 mo ago  (9/29/23) 6 mo ago  (9/15/23)   IgG  700 - 1,600 mg/dL 734 795 758 872 871 1,051      IMAGING:  CT SCAN OF THE CHEST WITHOUT CONTRAST  7/7/2022  CONCLUSION:     1. Worsening subtotal middle lobe atelectasis.   2. Development of mild multifocal bilateral lower lobe reticulonodular pulmonary infiltrate.   3. Small but increasing pericardial thickening/effusion.   4. Chronic changes of the chest are stable including the right upper lobe lung nodule.   5. Postoperative thoracic spine.       Assessment & Plan   1.  Hypogammaglobulinemia with recurring pulmonary infections.  She is doing much better since starting on Gamunex infusions every 2 weeks.  Her allergist is retiring and she has asked us to take over the infusions.  Her IgG level has improved and at this point we will try to modify her IVIG dosing to every 3 weeks.  2.  Anemia with somewhat ambiguous iron studies.  Her hemoglobin had normalized with once daily iron supplementation.  Her hemoglobin is decreased but her iron status seems to be okay and we suspect that she has a major component of anemia of chronic disease related to her rheumatoid arthritis with recent flare.  3.  Aberrant population of CD7 negative T cells noted on T and B cell analysis.  There is no evidence of monoclonal B-cell population.  We feel this is a reactive T-cell population and not indicative of an underlying T-cell leukemia or lymphoma.  4.  Positive TONG and elevated markers of inflammation.  Patient has been evaluated by rheumatology and will be following up with Dr. Ofelia Tinajero.       PLAN  Proceed with IVIG today, continued every 3 weeks.  Advised patient just to monitor the nodules on her arms and legs.  If any were to change they could be reevaluated.  Continue oral  iron supplement daily.    Continue to follow closely with her rheumatologist, Dr. Tinajero.  Patient is scheduled in 3 and 6 weeks with repeat CBC, IgG level, and IVIG infusion.  Follow-up with Dr. Carter as scheduled 9/20/2024 with repeat labs due for continued IVIG.    Call/ return sooner should the patient develop any new concerns or problems.    Patient is on a high risk medication requiring close monitoring for toxicity.

## 2024-07-22 NOTE — NURSING NOTE
Addendum from 7/19/2024, system downtime:    Pt arrived for IVIG after visit with EVELINE Arana. Copy of labs reviewed with beryl Vázquez to proceed.     IVIG infusion titrated with rates provided by pharmacy.     1248: IVIG started rate: 36mL/hr, volume 18. /84, HR 84  1319: IVIG rate changed 72mL/hr, volume 36. /72, HR 79.  1349: IVIG rate changed 136 mL/hr, max rate. /80, HR 79.  1407: IVIG infusion completed.

## 2024-07-23 VITALS
DIASTOLIC BLOOD PRESSURE: 80 MMHG | TEMPERATURE: 98.4 F | WEIGHT: 156.4 LBS | RESPIRATION RATE: 16 BRPM | SYSTOLIC BLOOD PRESSURE: 118 MMHG | OXYGEN SATURATION: 99 % | HEART RATE: 79 BPM | HEIGHT: 61 IN | BODY MASS INDEX: 29.53 KG/M2

## 2024-07-23 RX ORDER — DIPHENHYDRAMINE HCL 25 MG
25 CAPSULE ORAL ONCE
Qty: 1 CAPSULE | Refills: 0 | Status: COMPLETED | OUTPATIENT
Start: 2024-07-19 | End: 2024-07-19

## 2024-07-23 RX ORDER — SODIUM CHLORIDE 9 MG/ML
250 INJECTION, SOLUTION INTRAVENOUS ONCE
Qty: 250 ML | Refills: 0 | Status: COMPLETED | OUTPATIENT
Start: 2024-07-19 | End: 2024-07-19

## 2024-07-23 RX ORDER — ACETAMINOPHEN 325 MG/1
650 TABLET ORAL ONCE
Qty: 2 TABLET | Refills: 0 | Status: COMPLETED | OUTPATIENT
Start: 2024-07-19 | End: 2024-07-19

## 2024-07-26 NOTE — DOWNTIME EVENT NOTE
System Downtime Recovery  The EMR experienced a system downtime.  This downtime occurred on July 19 at 0600 AM for a duration of 10 hour(s).  During this downtime paper charting was completed by  Myrna Melgoza RN .  The following was documented on paper during the downtime and is now being back-entered: Reji Fonseca RN.  The following is remaining on paper and will be scanned into [facility]: 7/19 Infusion encounter.

## 2024-08-09 ENCOUNTER — INFUSION (OUTPATIENT)
Dept: ONCOLOGY | Facility: HOSPITAL | Age: 64
End: 2024-08-09
Payer: MEDICARE

## 2024-08-09 VITALS
OXYGEN SATURATION: 97 % | SYSTOLIC BLOOD PRESSURE: 113 MMHG | TEMPERATURE: 97.1 F | WEIGHT: 148.8 LBS | HEART RATE: 103 BPM | DIASTOLIC BLOOD PRESSURE: 74 MMHG | HEIGHT: 61 IN | BODY MASS INDEX: 28.09 KG/M2 | RESPIRATION RATE: 15 BRPM

## 2024-08-09 DIAGNOSIS — D80.1 HYPOGAMMAGLOBULINEMIA: Primary | ICD-10-CM

## 2024-08-09 DIAGNOSIS — I87.8 POOR VENOUS ACCESS: ICD-10-CM

## 2024-08-09 DIAGNOSIS — Z45.2 ENCOUNTER FOR ADJUSTMENT OR MANAGEMENT OF VASCULAR ACCESS DEVICE: ICD-10-CM

## 2024-08-09 LAB
BASOPHILS # BLD AUTO: 0.13 10*3/MM3 (ref 0–0.2)
BASOPHILS NFR BLD AUTO: 1.1 % (ref 0–1.5)
DEPRECATED RDW RBC AUTO: 36.9 FL (ref 37–54)
EOSINOPHIL # BLD AUTO: 0.2 10*3/MM3 (ref 0–0.4)
EOSINOPHIL NFR BLD AUTO: 1.8 % (ref 0.3–6.2)
ERYTHROCYTE [DISTWIDTH] IN BLOOD BY AUTOMATED COUNT: 12.1 % (ref 12.3–15.4)
HCT VFR BLD AUTO: 36.2 % (ref 34–46.6)
HGB BLD-MCNC: 13.3 G/DL (ref 12–15.9)
IGA1 MFR SER: 325 MG/DL (ref 70–400)
IGG1 SER-MCNC: 991 MG/DL (ref 700–1600)
IGM SERPL-MCNC: 76 MG/DL (ref 40–230)
IMM GRANULOCYTES # BLD AUTO: 0.06 10*3/MM3 (ref 0–0.05)
IMM GRANULOCYTES NFR BLD AUTO: 0.5 % (ref 0–0.5)
LYMPHOCYTES # BLD AUTO: 3 10*3/MM3 (ref 0.7–3.1)
LYMPHOCYTES NFR BLD AUTO: 26.5 % (ref 19.6–45.3)
MCH RBC QN AUTO: 30.6 PG (ref 26.6–33)
MCHC RBC AUTO-ENTMCNC: 36.7 G/DL (ref 31.5–35.7)
MCV RBC AUTO: 83.2 FL (ref 79–97)
MONOCYTES # BLD AUTO: 0.73 10*3/MM3 (ref 0.1–0.9)
MONOCYTES NFR BLD AUTO: 6.4 % (ref 5–12)
NEUTROPHILS NFR BLD AUTO: 63.7 % (ref 42.7–76)
NEUTROPHILS NFR BLD AUTO: 7.2 10*3/MM3 (ref 1.7–7)
NRBC BLD AUTO-RTO: 0 /100 WBC (ref 0–0.2)
PLATELET # BLD AUTO: 477 10*3/MM3 (ref 140–450)
PMV BLD AUTO: 9.9 FL (ref 6–12)
RBC # BLD AUTO: 4.35 10*6/MM3 (ref 3.77–5.28)
WBC NRBC COR # BLD AUTO: 11.32 10*3/MM3 (ref 3.4–10.8)

## 2024-08-09 PROCEDURE — 82784 ASSAY IGA/IGD/IGG/IGM EACH: CPT | Performed by: INTERNAL MEDICINE

## 2024-08-09 PROCEDURE — 63710000001 DIPHENHYDRAMINE PER 50 MG: Performed by: NURSE PRACTITIONER

## 2024-08-09 PROCEDURE — 25810000003 SODIUM CHLORIDE 0.9 % SOLUTION: Performed by: NURSE PRACTITIONER

## 2024-08-09 PROCEDURE — 96366 THER/PROPH/DIAG IV INF ADDON: CPT

## 2024-08-09 PROCEDURE — 96365 THER/PROPH/DIAG IV INF INIT: CPT

## 2024-08-09 PROCEDURE — 25010000002 HEPARIN LOCK FLUSH PER 10 UNITS: Performed by: INTERNAL MEDICINE

## 2024-08-09 PROCEDURE — 25010000002 IMMUNE GLOBULIN (HUMAN) 10 GM/100ML SOLUTION: Performed by: NURSE PRACTITIONER

## 2024-08-09 PROCEDURE — 85025 COMPLETE CBC W/AUTO DIFF WBC: CPT | Performed by: INTERNAL MEDICINE

## 2024-08-09 RX ORDER — HEPARIN SODIUM (PORCINE) LOCK FLUSH IV SOLN 100 UNIT/ML 100 UNIT/ML
500 SOLUTION INTRAVENOUS AS NEEDED
Status: DISCONTINUED | OUTPATIENT
Start: 2024-08-09 | End: 2024-08-09 | Stop reason: HOSPADM

## 2024-08-09 RX ORDER — ACETAMINOPHEN 325 MG/1
650 TABLET ORAL ONCE
Status: COMPLETED | OUTPATIENT
Start: 2024-08-09 | End: 2024-08-09

## 2024-08-09 RX ORDER — SODIUM CHLORIDE 0.9 % (FLUSH) 0.9 %
10 SYRINGE (ML) INJECTION AS NEEDED
Status: DISCONTINUED | OUTPATIENT
Start: 2024-08-09 | End: 2024-08-09 | Stop reason: HOSPADM

## 2024-08-09 RX ORDER — SODIUM CHLORIDE 0.9 % (FLUSH) 0.9 %
10 SYRINGE (ML) INJECTION AS NEEDED
OUTPATIENT
Start: 2024-08-09

## 2024-08-09 RX ORDER — SODIUM CHLORIDE 9 MG/ML
250 INJECTION, SOLUTION INTRAVENOUS ONCE
Status: COMPLETED | OUTPATIENT
Start: 2024-08-09 | End: 2024-08-09

## 2024-08-09 RX ORDER — DIPHENHYDRAMINE HCL 25 MG
25 CAPSULE ORAL ONCE
Status: COMPLETED | OUTPATIENT
Start: 2024-08-09 | End: 2024-08-09

## 2024-08-09 RX ORDER — HEPARIN SODIUM (PORCINE) LOCK FLUSH IV SOLN 100 UNIT/ML 100 UNIT/ML
500 SOLUTION INTRAVENOUS AS NEEDED
OUTPATIENT
Start: 2024-08-09

## 2024-08-09 RX ADMIN — ACETAMINOPHEN 650 MG: 325 TABLET ORAL at 13:48

## 2024-08-09 RX ADMIN — Medication 10 ML: at 15:33

## 2024-08-09 RX ADMIN — Medication 500 UNITS: at 15:33

## 2024-08-09 RX ADMIN — SODIUM CHLORIDE 250 ML: 9 INJECTION, SOLUTION INTRAVENOUS at 13:50

## 2024-08-09 RX ADMIN — IMMUNE GLOBULIN (HUMAN) 10 G: 10 INJECTION INTRAVENOUS; SUBCUTANEOUS at 14:07

## 2024-08-09 RX ADMIN — DIPHENHYDRAMINE HYDROCHLORIDE 25 MG: 25 CAPSULE ORAL at 13:48

## 2024-08-30 ENCOUNTER — TELEPHONE (OUTPATIENT)
Dept: ONCOLOGY | Facility: CLINIC | Age: 64
End: 2024-08-30

## 2024-08-30 ENCOUNTER — INFUSION (OUTPATIENT)
Dept: ONCOLOGY | Facility: HOSPITAL | Age: 64
End: 2024-08-30
Payer: MEDICARE

## 2024-08-30 VITALS
HEART RATE: 100 BPM | OXYGEN SATURATION: 98 % | HEIGHT: 61 IN | TEMPERATURE: 95.9 F | DIASTOLIC BLOOD PRESSURE: 70 MMHG | RESPIRATION RATE: 15 BRPM | BODY MASS INDEX: 28.74 KG/M2 | WEIGHT: 152.2 LBS | SYSTOLIC BLOOD PRESSURE: 102 MMHG

## 2024-08-30 DIAGNOSIS — I87.8 POOR VENOUS ACCESS: ICD-10-CM

## 2024-08-30 DIAGNOSIS — D80.1 HYPOGAMMAGLOBULINEMIA: Primary | ICD-10-CM

## 2024-08-30 DIAGNOSIS — Z45.2 ENCOUNTER FOR ADJUSTMENT OR MANAGEMENT OF VASCULAR ACCESS DEVICE: ICD-10-CM

## 2024-08-30 LAB
ALBUMIN SERPL-MCNC: 4.5 G/DL (ref 3.5–5.2)
ALBUMIN/GLOB SERPL: 1.4 G/DL
ALP SERPL-CCNC: 82 U/L (ref 39–117)
ALT SERPL W P-5'-P-CCNC: 17 U/L (ref 1–33)
ANION GAP SERPL CALCULATED.3IONS-SCNC: 13 MMOL/L (ref 5–15)
AST SERPL-CCNC: 24 U/L (ref 1–32)
BASOPHILS # BLD AUTO: 0.15 10*3/MM3 (ref 0–0.2)
BASOPHILS NFR BLD AUTO: 1.1 % (ref 0–1.5)
BILIRUB SERPL-MCNC: 0.3 MG/DL (ref 0–1.2)
BUN SERPL-MCNC: 55 MG/DL (ref 8–23)
BUN/CREAT SERPL: 54.5 (ref 7–25)
CALCIUM SPEC-SCNC: 9.5 MG/DL (ref 8.6–10.5)
CHLORIDE SERPL-SCNC: 89 MMOL/L (ref 98–107)
CO2 SERPL-SCNC: 35 MMOL/L (ref 22–29)
CREAT SERPL-MCNC: 1.01 MG/DL (ref 0.57–1)
DEPRECATED RDW RBC AUTO: 38.6 FL (ref 37–54)
EGFRCR SERPLBLD CKD-EPI 2021: 61.9 ML/MIN/1.73
EOSINOPHIL # BLD AUTO: 0.21 10*3/MM3 (ref 0–0.4)
EOSINOPHIL NFR BLD AUTO: 1.5 % (ref 0.3–6.2)
ERYTHROCYTE [DISTWIDTH] IN BLOOD BY AUTOMATED COUNT: 12.7 % (ref 12.3–15.4)
GLOBULIN UR ELPH-MCNC: 3.3 GM/DL
GLUCOSE SERPL-MCNC: 123 MG/DL (ref 65–99)
HCT VFR BLD AUTO: 36.8 % (ref 34–46.6)
HGB BLD-MCNC: 12.9 G/DL (ref 12–15.9)
IGA1 MFR SER: 335 MG/DL (ref 70–400)
IGG1 SER-MCNC: 1006 MG/DL (ref 700–1600)
IGM SERPL-MCNC: 74 MG/DL (ref 40–230)
IMM GRANULOCYTES # BLD AUTO: 0.04 10*3/MM3 (ref 0–0.05)
IMM GRANULOCYTES NFR BLD AUTO: 0.3 % (ref 0–0.5)
LYMPHOCYTES # BLD AUTO: 3.91 10*3/MM3 (ref 0.7–3.1)
LYMPHOCYTES NFR BLD AUTO: 27.8 % (ref 19.6–45.3)
MCH RBC QN AUTO: 29.9 PG (ref 26.6–33)
MCHC RBC AUTO-ENTMCNC: 35.1 G/DL (ref 31.5–35.7)
MCV RBC AUTO: 85.2 FL (ref 79–97)
MONOCYTES # BLD AUTO: 0.57 10*3/MM3 (ref 0.1–0.9)
MONOCYTES NFR BLD AUTO: 4.1 % (ref 5–12)
NEUTROPHILS NFR BLD AUTO: 65.2 % (ref 42.7–76)
NEUTROPHILS NFR BLD AUTO: 9.17 10*3/MM3 (ref 1.7–7)
NRBC BLD AUTO-RTO: 0 /100 WBC (ref 0–0.2)
PLATELET # BLD AUTO: 494 10*3/MM3 (ref 140–450)
PMV BLD AUTO: 9.4 FL (ref 6–12)
POTASSIUM SERPL-SCNC: 3.1 MMOL/L (ref 3.5–5.2)
PROT SERPL-MCNC: 7.8 G/DL (ref 6–8.5)
RBC # BLD AUTO: 4.32 10*6/MM3 (ref 3.77–5.28)
SODIUM SERPL-SCNC: 137 MMOL/L (ref 136–145)
WBC NRBC COR # BLD AUTO: 14.05 10*3/MM3 (ref 3.4–10.8)

## 2024-08-30 PROCEDURE — 80053 COMPREHEN METABOLIC PANEL: CPT | Performed by: INTERNAL MEDICINE

## 2024-08-30 PROCEDURE — 85025 COMPLETE CBC W/AUTO DIFF WBC: CPT | Performed by: INTERNAL MEDICINE

## 2024-08-30 PROCEDURE — 63710000001 DIPHENHYDRAMINE PER 50 MG: Performed by: NURSE PRACTITIONER

## 2024-08-30 PROCEDURE — 25810000003 SODIUM CHLORIDE 0.9 % SOLUTION: Performed by: NURSE PRACTITIONER

## 2024-08-30 PROCEDURE — 82784 ASSAY IGA/IGD/IGG/IGM EACH: CPT | Performed by: INTERNAL MEDICINE

## 2024-08-30 PROCEDURE — 25010000002 HEPARIN LOCK FLUSH PER 10 UNITS: Performed by: INTERNAL MEDICINE

## 2024-08-30 PROCEDURE — 96365 THER/PROPH/DIAG IV INF INIT: CPT

## 2024-08-30 PROCEDURE — 25010000002 IMMUNE GLOBULIN (HUMAN) 10 GM/100ML SOLUTION: Performed by: NURSE PRACTITIONER

## 2024-08-30 RX ORDER — DIPHENHYDRAMINE HCL 25 MG
25 CAPSULE ORAL ONCE
Status: COMPLETED | OUTPATIENT
Start: 2024-08-30 | End: 2024-08-30

## 2024-08-30 RX ORDER — HEPARIN SODIUM (PORCINE) LOCK FLUSH IV SOLN 100 UNIT/ML 100 UNIT/ML
500 SOLUTION INTRAVENOUS AS NEEDED
Status: DISCONTINUED | OUTPATIENT
Start: 2024-08-30 | End: 2024-08-30 | Stop reason: HOSPADM

## 2024-08-30 RX ORDER — HEPARIN SODIUM (PORCINE) LOCK FLUSH IV SOLN 100 UNIT/ML 100 UNIT/ML
500 SOLUTION INTRAVENOUS AS NEEDED
OUTPATIENT
Start: 2024-08-30

## 2024-08-30 RX ORDER — SODIUM CHLORIDE 0.9 % (FLUSH) 0.9 %
10 SYRINGE (ML) INJECTION AS NEEDED
Status: DISCONTINUED | OUTPATIENT
Start: 2024-08-30 | End: 2024-08-30 | Stop reason: HOSPADM

## 2024-08-30 RX ORDER — ACETAMINOPHEN 325 MG/1
650 TABLET ORAL ONCE
Status: COMPLETED | OUTPATIENT
Start: 2024-08-30 | End: 2024-08-30

## 2024-08-30 RX ORDER — SODIUM CHLORIDE 0.9 % (FLUSH) 0.9 %
10 SYRINGE (ML) INJECTION AS NEEDED
OUTPATIENT
Start: 2024-08-30

## 2024-08-30 RX ORDER — SODIUM CHLORIDE 9 MG/ML
250 INJECTION, SOLUTION INTRAVENOUS ONCE
Status: COMPLETED | OUTPATIENT
Start: 2024-08-30 | End: 2024-08-30

## 2024-08-30 RX ADMIN — SODIUM CHLORIDE 250 ML: 9 INJECTION, SOLUTION INTRAVENOUS at 14:53

## 2024-08-30 RX ADMIN — ACETAMINOPHEN 650 MG: 325 TABLET ORAL at 14:43

## 2024-08-30 RX ADMIN — IMMUNE GLOBULIN (HUMAN) 10 G: 10 INJECTION INTRAVENOUS; SUBCUTANEOUS at 14:55

## 2024-08-30 RX ADMIN — Medication 500 UNITS: at 16:17

## 2024-08-30 RX ADMIN — Medication 10 ML: at 16:17

## 2024-08-30 RX ADMIN — DIPHENHYDRAMINE HYDROCHLORIDE 25 MG: 25 CAPSULE ORAL at 14:42

## 2024-08-30 NOTE — TELEPHONE ENCOUNTER
Hub staff attempted to follow warm transfer process and was unsuccessful     Caller: Paola Reid    Relationship to patient: Self    Best call back number: [phone]    Patient is needing: REQUESTING C/B TO SEE IF SHE CAN COME IN AROUND 2:30 FOR HER INFUSION TODAY OR WILL NEED TO R/S

## 2024-08-30 NOTE — PATIENT INSTRUCTIONS
**Take Potassium Chloride 20 meq (2 tablets) in the morning and (1) tablet in the evening FOR ONE WEEK ONLY THEN RESUME YOUR USUAL DOSE OF 1 TABLET IN THE MORNING AND 1 TABLET IN THE EVENING.    **Stay hydrated/drink plenty of water

## 2024-08-30 NOTE — NURSING NOTE
Patient is tolerating IVIG without difficulty. Verbal order per EVELINE Fitzpatrick to continue IVIG as ordered.    Notified EVELINE Rubio of WBC 14.05 and ANC 9.17 (and that pt reports being treated for recent bronchitis), BUN 55, Creat 1.01, K 3.1.  Per Jerri's instruction emphasized importance of staying hydrated and to take 40 meq PO KCL every morning and 20 meq PO KCL every evening for ONE WEEK ONLY then to resume her normal KCL dose of 20 meq PO BID. Pt verbalized understanding and discharged in stable condition.

## 2024-09-04 RX ORDER — FERROUS GLUCONATE 324(38)MG
324 TABLET ORAL DAILY
Qty: 30 TABLET | Refills: 2 | Status: SHIPPED | OUTPATIENT
Start: 2024-09-04

## 2024-09-25 ENCOUNTER — LAB (OUTPATIENT)
Dept: LAB | Facility: HOSPITAL | Age: 64
End: 2024-09-25
Payer: MEDICARE

## 2024-09-25 ENCOUNTER — INFUSION (OUTPATIENT)
Dept: ONCOLOGY | Facility: HOSPITAL | Age: 64
End: 2024-09-25
Payer: MEDICARE

## 2024-09-25 ENCOUNTER — APPOINTMENT (OUTPATIENT)
Dept: ONCOLOGY | Facility: HOSPITAL | Age: 64
End: 2024-09-25
Payer: MEDICARE

## 2024-09-25 ENCOUNTER — OFFICE VISIT (OUTPATIENT)
Dept: ONCOLOGY | Facility: CLINIC | Age: 64
End: 2024-09-25
Payer: MEDICARE

## 2024-09-25 ENCOUNTER — APPOINTMENT (OUTPATIENT)
Dept: LAB | Facility: HOSPITAL | Age: 64
End: 2024-09-25
Payer: MEDICARE

## 2024-09-25 VITALS
BODY MASS INDEX: 31.21 KG/M2 | TEMPERATURE: 98.7 F | OXYGEN SATURATION: 92 % | HEART RATE: 105 BPM | RESPIRATION RATE: 16 BRPM | DIASTOLIC BLOOD PRESSURE: 70 MMHG | HEIGHT: 61 IN | SYSTOLIC BLOOD PRESSURE: 141 MMHG | WEIGHT: 165.3 LBS

## 2024-09-25 DIAGNOSIS — D64.9 ANEMIA, UNSPECIFIED TYPE: ICD-10-CM

## 2024-09-25 DIAGNOSIS — D80.1 HYPOGAMMAGLOBULINEMIA: ICD-10-CM

## 2024-09-25 DIAGNOSIS — D80.1 HYPOGAMMAGLOBULINEMIA: Primary | ICD-10-CM

## 2024-09-25 LAB
ALBUMIN SERPL-MCNC: 3.5 G/DL (ref 3.5–5.2)
ALBUMIN/GLOB SERPL: 1.3 G/DL
ALP SERPL-CCNC: 96 U/L (ref 39–117)
ALT SERPL W P-5'-P-CCNC: 38 U/L (ref 1–33)
ANION GAP SERPL CALCULATED.3IONS-SCNC: 12.6 MMOL/L (ref 5–15)
AST SERPL-CCNC: 34 U/L (ref 1–32)
BASOPHILS # BLD AUTO: 0.07 10*3/MM3 (ref 0–0.2)
BASOPHILS NFR BLD AUTO: 0.7 % (ref 0–1.5)
BILIRUB SERPL-MCNC: 0.3 MG/DL (ref 0–1.2)
BUN SERPL-MCNC: 14 MG/DL (ref 8–23)
BUN/CREAT SERPL: 17.7 (ref 7–25)
CALCIUM SPEC-SCNC: 8.2 MG/DL (ref 8.6–10.5)
CHLORIDE SERPL-SCNC: 101 MMOL/L (ref 98–107)
CO2 SERPL-SCNC: 27.4 MMOL/L (ref 22–29)
CREAT SERPL-MCNC: 0.79 MG/DL (ref 0.57–1)
DEPRECATED RDW RBC AUTO: 40.5 FL (ref 37–54)
EGFRCR SERPLBLD CKD-EPI 2021: 82.6 ML/MIN/1.73
EOSINOPHIL # BLD AUTO: 0.34 10*3/MM3 (ref 0–0.4)
EOSINOPHIL NFR BLD AUTO: 3.2 % (ref 0.3–6.2)
ERYTHROCYTE [DISTWIDTH] IN BLOOD BY AUTOMATED COUNT: 12.6 % (ref 12.3–15.4)
FERRITIN SERPL-MCNC: 136 NG/ML (ref 13–150)
GLOBULIN UR ELPH-MCNC: 2.7 GM/DL
GLUCOSE SERPL-MCNC: 118 MG/DL (ref 65–99)
HCT VFR BLD AUTO: 29.5 % (ref 34–46.6)
HGB BLD-MCNC: 10.4 G/DL (ref 12–15.9)
IGA1 MFR SER: 208 MG/DL (ref 70–400)
IGG1 SER-MCNC: 719 MG/DL (ref 700–1600)
IGM SERPL-MCNC: 48 MG/DL (ref 40–230)
IMM GRANULOCYTES # BLD AUTO: 0.04 10*3/MM3 (ref 0–0.05)
IMM GRANULOCYTES NFR BLD AUTO: 0.4 % (ref 0–0.5)
IRON 24H UR-MRATE: 16 MCG/DL (ref 37–145)
IRON SATN MFR SERPL: 6 % (ref 20–50)
LYMPHOCYTES # BLD AUTO: 1.59 10*3/MM3 (ref 0.7–3.1)
LYMPHOCYTES NFR BLD AUTO: 15.1 % (ref 19.6–45.3)
MCH RBC QN AUTO: 30.8 PG (ref 26.6–33)
MCHC RBC AUTO-ENTMCNC: 35.3 G/DL (ref 31.5–35.7)
MCV RBC AUTO: 87.3 FL (ref 79–97)
MONOCYTES # BLD AUTO: 0.39 10*3/MM3 (ref 0.1–0.9)
MONOCYTES NFR BLD AUTO: 3.7 % (ref 5–12)
NEUTROPHILS NFR BLD AUTO: 76.9 % (ref 42.7–76)
NEUTROPHILS NFR BLD AUTO: 8.13 10*3/MM3 (ref 1.7–7)
NRBC BLD AUTO-RTO: 0 /100 WBC (ref 0–0.2)
PLATELET # BLD AUTO: 256 10*3/MM3 (ref 140–450)
PMV BLD AUTO: 10.1 FL (ref 6–12)
POTASSIUM SERPL-SCNC: 4.2 MMOL/L (ref 3.5–5.2)
PROT SERPL-MCNC: 6.2 G/DL (ref 6–8.5)
RBC # BLD AUTO: 3.38 10*6/MM3 (ref 3.77–5.28)
SODIUM SERPL-SCNC: 141 MMOL/L (ref 136–145)
TIBC SERPL-MCNC: 279 MCG/DL (ref 298–536)
TRANSFERRIN SERPL-MCNC: 187 MG/DL (ref 200–360)
WBC NRBC COR # BLD AUTO: 10.56 10*3/MM3 (ref 3.4–10.8)

## 2024-09-25 PROCEDURE — 83540 ASSAY OF IRON: CPT

## 2024-09-25 PROCEDURE — 85025 COMPLETE CBC W/AUTO DIFF WBC: CPT

## 2024-09-25 PROCEDURE — 82728 ASSAY OF FERRITIN: CPT

## 2024-09-25 PROCEDURE — 36415 COLL VENOUS BLD VENIPUNCTURE: CPT

## 2024-09-25 PROCEDURE — 25810000003 SODIUM CHLORIDE 0.9 % SOLUTION: Performed by: INTERNAL MEDICINE

## 2024-09-25 PROCEDURE — 63710000001 DIPHENHYDRAMINE PER 50 MG: Performed by: INTERNAL MEDICINE

## 2024-09-25 PROCEDURE — 80053 COMPREHEN METABOLIC PANEL: CPT

## 2024-09-25 PROCEDURE — 84466 ASSAY OF TRANSFERRIN: CPT

## 2024-09-25 PROCEDURE — 96365 THER/PROPH/DIAG IV INF INIT: CPT

## 2024-09-25 PROCEDURE — 82784 ASSAY IGA/IGD/IGG/IGM EACH: CPT | Performed by: INTERNAL MEDICINE

## 2024-09-25 PROCEDURE — 25010000002 IMMUNE GLOBULIN (HUMAN) 10 GM/100ML SOLUTION: Performed by: INTERNAL MEDICINE

## 2024-09-25 RX ORDER — DIPHENHYDRAMINE HCL 25 MG
25 CAPSULE ORAL ONCE
Status: COMPLETED | OUTPATIENT
Start: 2024-09-25 | End: 2024-09-25

## 2024-09-25 RX ORDER — ONDANSETRON 4 MG/1
4 TABLET, FILM COATED ORAL EVERY 8 HOURS PRN
COMMUNITY

## 2024-09-25 RX ORDER — ACETAMINOPHEN 325 MG/1
650 TABLET ORAL ONCE
Status: COMPLETED | OUTPATIENT
Start: 2024-09-25 | End: 2024-09-25

## 2024-09-25 RX ORDER — SODIUM CHLORIDE 9 MG/ML
250 INJECTION, SOLUTION INTRAVENOUS ONCE
Status: COMPLETED | OUTPATIENT
Start: 2024-09-25 | End: 2024-09-25

## 2024-09-25 RX ADMIN — DIPHENHYDRAMINE HYDROCHLORIDE 25 MG: 25 CAPSULE ORAL at 12:00

## 2024-09-25 RX ADMIN — SODIUM CHLORIDE 250 ML: 9 INJECTION, SOLUTION INTRAVENOUS at 12:31

## 2024-09-25 RX ADMIN — IMMUNE GLOBULIN (HUMAN) 10 G: 10 INJECTION INTRAVENOUS; SUBCUTANEOUS at 12:31

## 2024-09-25 RX ADMIN — ACETAMINOPHEN 650 MG: 325 TABLET ORAL at 12:00

## 2024-10-11 ENCOUNTER — INFUSION (OUTPATIENT)
Dept: ONCOLOGY | Facility: HOSPITAL | Age: 64
End: 2024-10-11
Payer: MEDICARE

## 2024-10-11 VITALS
BODY MASS INDEX: 29.57 KG/M2 | RESPIRATION RATE: 15 BRPM | DIASTOLIC BLOOD PRESSURE: 66 MMHG | OXYGEN SATURATION: 98 % | HEIGHT: 61 IN | HEART RATE: 96 BPM | TEMPERATURE: 98.6 F | SYSTOLIC BLOOD PRESSURE: 102 MMHG | WEIGHT: 156.6 LBS

## 2024-10-11 DIAGNOSIS — I87.8 POOR VENOUS ACCESS: ICD-10-CM

## 2024-10-11 DIAGNOSIS — D80.1 HYPOGAMMAGLOBULINEMIA: Primary | ICD-10-CM

## 2024-10-11 DIAGNOSIS — Z45.2 ENCOUNTER FOR ADJUSTMENT OR MANAGEMENT OF VASCULAR ACCESS DEVICE: ICD-10-CM

## 2024-10-11 LAB
BASOPHILS # BLD AUTO: 0.15 10*3/MM3 (ref 0–0.2)
BASOPHILS NFR BLD AUTO: 1.5 % (ref 0–1.5)
DEPRECATED RDW RBC AUTO: 41.7 FL (ref 37–54)
EOSINOPHIL # BLD AUTO: 0.29 10*3/MM3 (ref 0–0.4)
EOSINOPHIL NFR BLD AUTO: 2.8 % (ref 0.3–6.2)
ERYTHROCYTE [DISTWIDTH] IN BLOOD BY AUTOMATED COUNT: 13.2 % (ref 12.3–15.4)
HCT VFR BLD AUTO: 32.1 % (ref 34–46.6)
HGB BLD-MCNC: 10.9 G/DL (ref 12–15.9)
IGA1 MFR SER: 281 MG/DL (ref 70–400)
IGG1 SER-MCNC: 975 MG/DL (ref 700–1600)
IGM SERPL-MCNC: 63 MG/DL (ref 40–230)
IMM GRANULOCYTES # BLD AUTO: 0.03 10*3/MM3 (ref 0–0.05)
IMM GRANULOCYTES NFR BLD AUTO: 0.3 % (ref 0–0.5)
LYMPHOCYTES # BLD AUTO: 3.28 10*3/MM3 (ref 0.7–3.1)
LYMPHOCYTES NFR BLD AUTO: 32 % (ref 19.6–45.3)
MCH RBC QN AUTO: 29.6 PG (ref 26.6–33)
MCHC RBC AUTO-ENTMCNC: 34 G/DL (ref 31.5–35.7)
MCV RBC AUTO: 87.2 FL (ref 79–97)
MONOCYTES # BLD AUTO: 0.66 10*3/MM3 (ref 0.1–0.9)
MONOCYTES NFR BLD AUTO: 6.4 % (ref 5–12)
NEUTROPHILS NFR BLD AUTO: 5.83 10*3/MM3 (ref 1.7–7)
NEUTROPHILS NFR BLD AUTO: 57 % (ref 42.7–76)
NRBC BLD AUTO-RTO: 0 /100 WBC (ref 0–0.2)
PLATELET # BLD AUTO: 495 10*3/MM3 (ref 140–450)
PMV BLD AUTO: 9.7 FL (ref 6–12)
RBC # BLD AUTO: 3.68 10*6/MM3 (ref 3.77–5.28)
WBC NRBC COR # BLD AUTO: 10.24 10*3/MM3 (ref 3.4–10.8)

## 2024-10-11 PROCEDURE — 63710000001 DIPHENHYDRAMINE PER 50 MG: Performed by: NURSE PRACTITIONER

## 2024-10-11 PROCEDURE — 25010000002 IMMUNE GLOBULIN (HUMAN) 10 GM/100ML SOLUTION: Performed by: NURSE PRACTITIONER

## 2024-10-11 PROCEDURE — 82784 ASSAY IGA/IGD/IGG/IGM EACH: CPT | Performed by: NURSE PRACTITIONER

## 2024-10-11 PROCEDURE — 85025 COMPLETE CBC W/AUTO DIFF WBC: CPT | Performed by: NURSE PRACTITIONER

## 2024-10-11 PROCEDURE — 25010000002 HEPARIN LOCK FLUSH PER 10 UNITS: Performed by: INTERNAL MEDICINE

## 2024-10-11 PROCEDURE — 96365 THER/PROPH/DIAG IV INF INIT: CPT

## 2024-10-11 RX ORDER — SODIUM CHLORIDE 0.9 % (FLUSH) 0.9 %
10 SYRINGE (ML) INJECTION AS NEEDED
Status: DISCONTINUED | OUTPATIENT
Start: 2024-10-11 | End: 2024-10-11 | Stop reason: HOSPADM

## 2024-10-11 RX ORDER — SODIUM CHLORIDE 0.9 % (FLUSH) 0.9 %
10 SYRINGE (ML) INJECTION AS NEEDED
OUTPATIENT
Start: 2024-10-11

## 2024-10-11 RX ORDER — HEPARIN SODIUM (PORCINE) LOCK FLUSH IV SOLN 100 UNIT/ML 100 UNIT/ML
500 SOLUTION INTRAVENOUS AS NEEDED
Status: DISCONTINUED | OUTPATIENT
Start: 2024-10-11 | End: 2024-10-11 | Stop reason: HOSPADM

## 2024-10-11 RX ORDER — DIPHENHYDRAMINE HCL 25 MG
25 CAPSULE ORAL ONCE
Status: COMPLETED | OUTPATIENT
Start: 2024-10-11 | End: 2024-10-11

## 2024-10-11 RX ORDER — HEPARIN SODIUM (PORCINE) LOCK FLUSH IV SOLN 100 UNIT/ML 100 UNIT/ML
500 SOLUTION INTRAVENOUS AS NEEDED
OUTPATIENT
Start: 2024-10-11

## 2024-10-11 RX ORDER — ACETAMINOPHEN 325 MG/1
650 TABLET ORAL ONCE
Status: COMPLETED | OUTPATIENT
Start: 2024-10-11 | End: 2024-10-11

## 2024-10-11 RX ADMIN — ACETAMINOPHEN 650 MG: 325 TABLET ORAL at 13:48

## 2024-10-11 RX ADMIN — DIPHENHYDRAMINE HYDROCHLORIDE 25 MG: 25 CAPSULE ORAL at 13:48

## 2024-10-11 RX ADMIN — Medication 10 ML: at 15:16

## 2024-10-11 RX ADMIN — Medication 500 UNITS: at 15:16

## 2024-10-11 RX ADMIN — IMMUNE GLOBULIN (HUMAN) 10 G: 10 INJECTION INTRAVENOUS; SUBCUTANEOUS at 13:56

## 2024-10-16 ENCOUNTER — TELEPHONE (OUTPATIENT)
Dept: ONCOLOGY | Facility: CLINIC | Age: 64
End: 2024-10-16
Payer: MEDICARE

## 2024-10-16 NOTE — TELEPHONE ENCOUNTER
----- Message from Len Xiao sent at 10/15/2024 10:36 AM EDT -----  I dont see any issues with this on our end  ----- Message -----  From: Tigist Wilder RN  Sent: 10/15/2024   9:34 AM EDT  To: Tigist Wilder RN; Len Xiao, [facility]; #    Hi friends, please see messages below. Pharmacy and pre-cert cool with this? Let me know. Thank you!  ----- Message -----  From: Mk Carter MD  Sent: 10/14/2024   6:00 PM EDT  To: Tigist Wilder RN; Reji Fonseca RN    I believe in the past she was on a two week schedule, so as long as it's OK with pharmacy it's fine with me. [facility]  ----- Message -----  From: Reji Fonseca RN  Sent: 10/11/2024   2:15 PM EDT  To: Mk Carter MD; Tigist Wilder RN    Ms. Reid was at  today receiving her IVIG infusion. She stated that her knee surgery was delayed until 10/30 due to a bad case of Bronchitis. She was asking if we could change her 11/8 appt to 10/25 (2 weeks after today's infusion) since she isn't sure how soon after her surgery that she'll be able return. Please advise if appropriate.

## 2024-10-16 NOTE — TELEPHONE ENCOUNTER
----- Message from Phil ARRIETA sent at 10/16/2024  2:22 PM EDT -----  She should be fine doing that  Thank you  ----- Message -----  From: Joyce Rodrigez RN  Sent: 10/16/2024   2:00 PM EDT  To: MARILY Brenner,     Would you be able to help me with this Carter patient on IVIG? He is wanting her to get her next treatment 2 weeks after her last and then go back to every 3 weeks. Is this okay?    Thank you  ----- Message -----  From: Mireille Cueto  Sent: 10/16/2024   1:06 PM EDT  To: Joyce Rodrigez RN    You will have to ask phil or kiko  ----- Message -----  From: Joyce Rodrigez RN  Sent: 10/16/2024  12:56 PM EDT  To: Mireille Kendrick,     Is it okay for the patient to get her IVIG every 2 weeks instead of every 3 weeks for one treatment?    Thank you  ----- Message -----  From: Tigist Wilder RN  Sent: 10/16/2024  12:30 PM EDT  To: Tigist Wilder RN    Has everyone responded yet?  ----- Message -----  From: Tigist Wilder RN  Sent: 10/15/2024   9:34 AM EDT  To: Tigist Wilder RN; [facility]; #    Hi friends, please see messages below. Pharmacy and pre-cert cool with this? Let me know. Thank you!  ----- Message -----  From: Mk Carter MD  Sent: 10/14/2024   6:00 PM EDT  To: Tigist Wilder RN; Reji Fonseca RN    I believe in the past she was on a two week schedule, so as long as it's OK with pharmacy it's fine with me. [facility]  ----- Message -----  From: Reji Fonseca, MARILY  Sent: 10/11/2024   2:15 PM EDT  To: Mk Carter MD; Tigist Wilder RN    Ms. Reid was at  today receiving her IVIG infusion. She stated that her knee surgery was delayed until 10/30 due to a bad case of Bronchitis. She was asking if we could change her 11/8 appt to 10/25 (2 weeks after today's infusion) since she isn't sure how soon after her surgery that she'll be able return. Please advise if appropriate.

## 2024-10-17 DIAGNOSIS — D80.1 HYPOGAMMAGLOBULINEMIA: Primary | ICD-10-CM

## 2024-10-17 NOTE — PROGRESS NOTES
"  Subjective       PATIENT NAME:  Paola Reid  YOB: 1960  PATIENT'S SEX:  female    PATIENT'S ADDRESS:  [address] [place], [place] [id number]  PROVIDER:      Encounter Diagnosis   Name Primary?    Hypogammaglobulinemia Yes     Allergies   Allergen Reactions    Levothyroxine Other (See Comments)     Unable to take due to NO THYROID    Diclofenac Swelling    Losartan Other (See Comments)     \"GIVES ME THE JITTERS\"  \"GIVES ME THE JITTERS\"  <paragraph>\"GIVES ME THE JITTERS\"</paragraph>      Other Unknown - Low Severity    Adhesive Tape Rash     blisters    Baclofen Other (See Comments)     Makes me crazy, jitters when mixed with other pain medications     Meperidine Hives         DATE      10/17/2024      Patient typically received IVIG every 3 weeks, but Ms. Reid's knee surgery was delayed until 10/30 due to Bronchitis. Because we are not sure how soon after surgery she will be able to return we are requesting to move her 11/8 appt to 10/25 (2 weeks after her last infusion). Once she has had her surgery she will resume her every 3 week IVIG schedule.     Electronically Signed By: Mk Carter MD  (NPI: [id number])       "

## 2024-10-25 ENCOUNTER — INFUSION (OUTPATIENT)
Dept: ONCOLOGY | Facility: HOSPITAL | Age: 64
End: 2024-10-25
Payer: MEDICARE

## 2024-10-25 VITALS
TEMPERATURE: 98.2 F | WEIGHT: 157 LBS | HEIGHT: 61 IN | OXYGEN SATURATION: 97 % | SYSTOLIC BLOOD PRESSURE: 132 MMHG | RESPIRATION RATE: 15 BRPM | DIASTOLIC BLOOD PRESSURE: 87 MMHG | BODY MASS INDEX: 29.64 KG/M2 | HEART RATE: 92 BPM

## 2024-10-25 DIAGNOSIS — D80.1 HYPOGAMMAGLOBULINEMIA: Primary | ICD-10-CM

## 2024-10-25 DIAGNOSIS — I87.8 POOR VENOUS ACCESS: ICD-10-CM

## 2024-10-25 DIAGNOSIS — Z45.2 ENCOUNTER FOR ADJUSTMENT OR MANAGEMENT OF VASCULAR ACCESS DEVICE: ICD-10-CM

## 2024-10-25 LAB
BASOPHILS # BLD AUTO: 0.12 10*3/MM3 (ref 0–0.2)
BASOPHILS NFR BLD AUTO: 1.4 % (ref 0–1.5)
DEPRECATED RDW RBC AUTO: 40.8 FL (ref 37–54)
EOSINOPHIL # BLD AUTO: 0.43 10*3/MM3 (ref 0–0.4)
EOSINOPHIL NFR BLD AUTO: 4.9 % (ref 0.3–6.2)
ERYTHROCYTE [DISTWIDTH] IN BLOOD BY AUTOMATED COUNT: 13 % (ref 12.3–15.4)
HCT VFR BLD AUTO: 33 % (ref 34–46.6)
HGB BLD-MCNC: 11.5 G/DL (ref 12–15.9)
IGA1 MFR SER: 247 MG/DL (ref 70–400)
IGG1 SER-MCNC: 930 MG/DL (ref 700–1600)
IGM SERPL-MCNC: 55 MG/DL (ref 40–230)
IMM GRANULOCYTES # BLD AUTO: 0.03 10*3/MM3 (ref 0–0.05)
IMM GRANULOCYTES NFR BLD AUTO: 0.3 % (ref 0–0.5)
LYMPHOCYTES # BLD AUTO: 3.59 10*3/MM3 (ref 0.7–3.1)
LYMPHOCYTES NFR BLD AUTO: 41.2 % (ref 19.6–45.3)
MCH RBC QN AUTO: 30.1 PG (ref 26.6–33)
MCHC RBC AUTO-ENTMCNC: 34.8 G/DL (ref 31.5–35.7)
MCV RBC AUTO: 86.4 FL (ref 79–97)
MONOCYTES # BLD AUTO: 0.48 10*3/MM3 (ref 0.1–0.9)
MONOCYTES NFR BLD AUTO: 5.5 % (ref 5–12)
NEUTROPHILS NFR BLD AUTO: 4.07 10*3/MM3 (ref 1.7–7)
NEUTROPHILS NFR BLD AUTO: 46.7 % (ref 42.7–76)
NRBC BLD AUTO-RTO: 0 /100 WBC (ref 0–0.2)
PLATELET # BLD AUTO: 316 10*3/MM3 (ref 140–450)
PMV BLD AUTO: 9.7 FL (ref 6–12)
RBC # BLD AUTO: 3.82 10*6/MM3 (ref 3.77–5.28)
WBC NRBC COR # BLD AUTO: 8.72 10*3/MM3 (ref 3.4–10.8)

## 2024-10-25 PROCEDURE — 85025 COMPLETE CBC W/AUTO DIFF WBC: CPT | Performed by: INTERNAL MEDICINE

## 2024-10-25 PROCEDURE — 63710000001 DIPHENHYDRAMINE PER 50 MG: Performed by: INTERNAL MEDICINE

## 2024-10-25 PROCEDURE — 82784 ASSAY IGA/IGD/IGG/IGM EACH: CPT | Performed by: INTERNAL MEDICINE

## 2024-10-25 PROCEDURE — 25010000002 IMMUNE GLOBULIN (HUMAN) 10 GM/100ML SOLUTION: Performed by: INTERNAL MEDICINE

## 2024-10-25 PROCEDURE — 25010000002 HEPARIN LOCK FLUSH PER 10 UNITS: Performed by: INTERNAL MEDICINE

## 2024-10-25 PROCEDURE — 96365 THER/PROPH/DIAG IV INF INIT: CPT

## 2024-10-25 RX ORDER — HEPARIN SODIUM (PORCINE) LOCK FLUSH IV SOLN 100 UNIT/ML 100 UNIT/ML
500 SOLUTION INTRAVENOUS AS NEEDED
OUTPATIENT
Start: 2024-10-25

## 2024-10-25 RX ORDER — HEPARIN SODIUM (PORCINE) LOCK FLUSH IV SOLN 100 UNIT/ML 100 UNIT/ML
500 SOLUTION INTRAVENOUS AS NEEDED
Status: DISCONTINUED | OUTPATIENT
Start: 2024-10-25 | End: 2024-10-25 | Stop reason: HOSPADM

## 2024-10-25 RX ORDER — ACETAMINOPHEN 325 MG/1
650 TABLET ORAL ONCE
Status: COMPLETED | OUTPATIENT
Start: 2024-10-25 | End: 2024-10-25

## 2024-10-25 RX ORDER — SODIUM CHLORIDE 0.9 % (FLUSH) 0.9 %
10 SYRINGE (ML) INJECTION AS NEEDED
Status: DISCONTINUED | OUTPATIENT
Start: 2024-10-25 | End: 2024-10-25 | Stop reason: HOSPADM

## 2024-10-25 RX ORDER — SODIUM CHLORIDE 0.9 % (FLUSH) 0.9 %
10 SYRINGE (ML) INJECTION AS NEEDED
OUTPATIENT
Start: 2024-10-25

## 2024-10-25 RX ORDER — DIPHENHYDRAMINE HCL 25 MG
25 CAPSULE ORAL ONCE
Status: COMPLETED | OUTPATIENT
Start: 2024-10-25 | End: 2024-10-25

## 2024-10-25 RX ADMIN — ACETAMINOPHEN 650 MG: 325 TABLET ORAL at 13:40

## 2024-10-25 RX ADMIN — Medication 10 ML: at 15:21

## 2024-10-25 RX ADMIN — DIPHENHYDRAMINE HYDROCHLORIDE 25 MG: 25 CAPSULE ORAL at 13:40

## 2024-10-25 RX ADMIN — Medication 500 UNITS: at 15:21

## 2024-10-25 RX ADMIN — IMMUNE GLOBULIN (HUMAN) 10 G: 10 INJECTION INTRAVENOUS; SUBCUTANEOUS at 13:56

## 2024-10-25 NOTE — NURSING NOTE
PT REPORTS THAT SHE IS HAVING KNEE SURGERY NEXT WK AND THAT SHE WON'T BE ABLE TO MAKE IT BACK IN 3 WKS. TOLD PT WE WILL LEAVE HER APPTS AS IS AND IF SHE NEEDS TO CHANGE SOMETHING WHEN THE TIME COMES SHE CAN. PT V/U. ALSO NOTIFIED DR. MONDRAGON AND HE IS OK W/ PLAN.

## 2024-11-08 ENCOUNTER — TRANSCRIBE ORDERS (OUTPATIENT)
Dept: HOME HEALTH SERVICES | Facility: HOME HEALTHCARE | Age: 64
End: 2024-11-08
Payer: MEDICARE

## 2024-11-08 DIAGNOSIS — Z96.651 AFTERCARE FOLLOWING RIGHT KNEE JOINT REPLACEMENT SURGERY: Primary | ICD-10-CM

## 2024-11-08 DIAGNOSIS — Z47.1 AFTERCARE FOLLOWING RIGHT KNEE JOINT REPLACEMENT SURGERY: Primary | ICD-10-CM

## 2024-11-09 ENCOUNTER — HOME HEALTH ADMISSION (OUTPATIENT)
Dept: HOME HEALTH SERVICES | Facility: HOME HEALTHCARE | Age: 64
End: 2024-11-09
Payer: MEDICARE

## 2024-11-11 ENCOUNTER — HOME CARE VISIT (OUTPATIENT)
Dept: HOME HEALTH SERVICES | Facility: HOME HEALTHCARE | Age: 64
End: 2024-11-11
Payer: MEDICARE

## 2024-11-11 PROCEDURE — G0299 HHS/HOSPICE OF RN EA 15 MIN: HCPCS

## 2024-11-11 NOTE — Clinical Note
"SOC Note    RE:   Paola Reid    56    Home Health ordered for: disciplines SN, PT     Reason for Hosp/Primary Dx/Co-morbidities: Aftercare following joint replacement surgery.  Patient had surgery to RT knee.  VALERIE dressing in place.  Periwound is CDI, no redness, no outward ss infection at this time.    Focus of Care: aftercare following joint replacement surgery     Patient's goal(s):\"I want to be back to walking around without a walker or anything\"    Current Functional status/mobility/DME: Ambulates with walker, supervision due to 3 dogs in home     HB status/Living Arrangements: Lives in home by herself, three dogs in home (2 are large) that like to jump up on people.  Patient made a device to keep dogs from jumping on her leg.  Daughter is here to assist patient during appt     Skin Integrity/wound status: Incision with VALERIE dressing in place to rt knee     Code Status: full     Fall Risk/Safety concerns: High risk for falls due to dogs in home, rt knee surgery     Educated on Emergency Plan, steps to take prior to going to the ER and when to Call Home Health First:  yes     Medication issues/Concerns:  patient has obtained all meds and has in home.  Teaching on dosing for pain meds, timing of meds with other meds that could cause drowsiness as well     Additional Problems/Concerns: n/a     SDOH Barriers (i.e. caregiver concerns, social isolation, transportation, food insecurity, environment, income etc.)/Need for MSW: not at this time "

## 2024-11-12 ENCOUNTER — HOME CARE VISIT (OUTPATIENT)
Dept: HOME HEALTH SERVICES | Facility: HOME HEALTHCARE | Age: 64
End: 2024-11-12
Payer: MEDICARE

## 2024-11-12 VITALS — HEART RATE: 106 BPM | RESPIRATION RATE: 18 BRPM | TEMPERATURE: 98.6 F | OXYGEN SATURATION: 98 %

## 2024-11-12 PROCEDURE — G0152 HHCP-SERV OF OT,EA 15 MIN: HCPCS

## 2024-11-12 PROCEDURE — G0151 HHCP-SERV OF PT,EA 15 MIN: HCPCS

## 2024-11-12 NOTE — HOME HEALTH
"REASON FOR REFERRAL: 64  year old  female  presents with complaints of :  right knee pain following recent TKA    Home health ordered for: PT    SUBJECTIVE: \"What do you think about my dog invention?\"       DIAGNOSIS/FOCUS OF CARE:  R TKA Dr Mora 10/30/24    PERTINENT MEDICAL HISTORY:   21 back surgeries- per patient    Anxiety    Back pain radiates into right leg  DDD (degenerative disc disease), lumbar    Depression    SHORTNESS OF BREATH  Hypertension    Kyphosis    MRSA (methicillin resistant Staphylococcus aureus - LEFT LOWER ABDOMEN - after pain pump surgery; AND 2018 RIGHT GREAT TOE AND 2019 RIGHT TOE  Neck pain,AND NUMBNESS/TINGLING RIGHT HAND  Neuropathy -NUMBNESS AND TINGLING IN FEET, right leg  Obesity    Ruptured cervical discx3   Substance abuse - ETOH 24 YEARS NO ALCOHOL  Thyroid disease      APPENDECTOMY       BACK [id number]/14 and 5/2017 rods in her back  BACK [id number]/25/2017 osteotomy T7-T8, PSF T3-T8, Instr T3-T11  BREAST SURGERY- breast implants  BUNIONECTOMY Right 03/10/2017  CATARACT EXTRACTION Bilateral    LUMBAR SPINE SURGERYx 18 SINCE 1979  ANTERIOR CERVICAL DECOMPRESSION FUSION C3-C4, C6-C7 WITH INSTRUMENTATION 2021  pain pump AND LATER REMOVAL    PRIOR LEVEL OF FUNCTION: per patient report, patient was independent with self care and mobility with aid of cane;     PATIENT PHYSICAL THERAPY GOAL(S): \"I want to walk better so I can have less pain\"     SOCIAL ENVIRONMENT/ DME /POTENTIAL BARRIERS FOR GOAL ATTAINMENT : lives alone with 3 energetic dogs . stepstool for bed; RW; cane;     SKIN INTEGRITY/ WOUND STATUS:  see wound care screen for details    CODE STATUS:  full    MEDICATION ISSUES/ CONCERNS:  none identified    HOMEBOUND STATUS: yes - see homebound screen for details    PROBLEMS IDENTIFIED: see care plan    FUNCTIONAL STATUS/ FALL RISK/ SAFETY: see physical therapy evaluation/ care plan        ASSESSMENT: patient ambulating in home with RW.  may be ready to trial SPC however due " to energetic and unrestrained dogs in home, recommend not trialing cane unless dogs are secured. mod I/supervision with transfers. able to teach back I with bed mobility.  Has written HEP provided by rehab facility but patient has not completed today. Patient daughter and granddaughter are present during PT evaluation. discussed transition to outpatient PT as patient progressing as expected. Reviewed need to slowly reduce narcotic use to allow for eventual transition to driving however patient reports that baseline use is 10/325  up to 5 times daily and that has not stopped her from driving before.  encouraged patient to identify caregiver who can assist with transportation for at least the 1st week of outpatient PT.  Patient agreeable.  Overall despite patient multiple medical comorbidities, patient able to ambulate with Supervision/mod I with aid of walker      __________________________________  PLAN FOR NEXT VISIT:   MEDICAL NECESSITY FOR ONGOING SKILLED THERAPY:  Skilled physical therapy is medically necessary for treatment of: gait, transfer, ROM, strength and balance deficits following recent hospitalization for: R TKA .Requires instruction in appropriate progression of exercises; education in fall prevention/pain/edema management; gait training to reduce reliance on assistive device; balance retraining to prevent falls.  Without skilled physical therapy, patient at risk for: falls, rehospitalization, increased reliance on caregiver, chronic pain,       SPECIFIC INTERVENTIONS AND GOALS TO ADDRESS ON NEXT VISIT:  - progress HEP to include: standing exercises  - gait training to restore normal mechanics  - fall prevention education  - continued home safety education  - increase AROM R knee greater than  degrees  - transfer training    FREQUENCY AND DURATION:  - 2 week 1; 1 week 1    ANY OTHER FOLLOW UP NEEDED: none    DATE OF NEXT APPOINTMENT WITH DOCTOR: 11/15 Aide Amado; [facility]; outpatient [facility]    REASSESSMENT DUE DATE: 30 day: 12/11/24      Dr. Bianchi  electronically notified of POC via case communication on  11/13/24; contacted office on 11/13 am to request outpatient orders sent to preferred clinic for transition in next week

## 2024-11-12 NOTE — Clinical Note
Patient admitted to [facility] PT services on 11/12/24.  Plan for 2 week 1; 1 week 1 for ther-ex instruction, gait training, fall prevention education, pain/edema management following recent TKA

## 2024-11-13 ENCOUNTER — HOME CARE VISIT (OUTPATIENT)
Dept: HOME HEALTH SERVICES | Facility: HOME HEALTHCARE | Age: 64
End: 2024-11-13
Payer: MEDICARE

## 2024-11-13 ENCOUNTER — HOSPITAL ENCOUNTER (INPATIENT)
Facility: HOSPITAL | Age: 64
LOS: 1 days | Discharge: HOME | End: 2024-11-14
Attending: EMERGENCY MEDICINE | Admitting: INTERNAL MEDICINE
Payer: MEDICARE

## 2024-11-13 ENCOUNTER — APPOINTMENT (OUTPATIENT)
Dept: NEUROLOGY | Facility: HOSPITAL | Age: 64
End: 2024-11-13
Payer: MEDICARE

## 2024-11-13 ENCOUNTER — APPOINTMENT (OUTPATIENT)
Dept: GENERAL RADIOLOGY | Facility: HOSPITAL | Age: 64
End: 2024-11-13
Payer: MEDICARE

## 2024-11-13 ENCOUNTER — APPOINTMENT (OUTPATIENT)
Dept: CT IMAGING | Facility: HOSPITAL | Age: 64
End: 2024-11-13
Payer: MEDICARE

## 2024-11-13 DIAGNOSIS — G93.40 ACUTE ENCEPHALOPATHY: Primary | ICD-10-CM

## 2024-11-13 DIAGNOSIS — J96.00 ACUTE RESPIRATORY FAILURE, UNSPECIFIED WHETHER WITH HYPOXIA OR HYPERCAPNIA: ICD-10-CM

## 2024-11-13 LAB
ABO GROUP BLD: NORMAL
ALBUMIN SERPL-MCNC: 3.9 G/DL (ref 3.5–5.2)
ALBUMIN SERPL-MCNC: 4 G/DL (ref 3.5–5.2)
ALBUMIN/GLOB SERPL: 1.3 G/DL
ALBUMIN/GLOB SERPL: 1.5 G/DL
ALP SERPL-CCNC: 84 U/L (ref 39–117)
ALP SERPL-CCNC: 89 U/L (ref 39–117)
ALT SERPL W P-5'-P-CCNC: 12 U/L (ref 1–33)
ALT SERPL W P-5'-P-CCNC: 13 U/L (ref 1–33)
AMPHET+METHAMPHET UR QL: NEGATIVE
ANION GAP SERPL CALCULATED.3IONS-SCNC: 13.1 MMOL/L (ref 5–15)
ANION GAP SERPL CALCULATED.3IONS-SCNC: 13.5 MMOL/L (ref 5–15)
ANION GAP SERPL CALCULATED.3IONS-SCNC: 14 MMOL/L (ref 5–15)
ANTI-E: NORMAL
APTT PPP: 31.2 SECONDS (ref 22.7–35.4)
ARTERIAL PATENCY WRIST A: ABNORMAL
AST SERPL-CCNC: 15 U/L (ref 1–32)
AST SERPL-CCNC: 23 U/L (ref 1–32)
ATMOSPHERIC PRESS: 755.2 MMHG
BARBITURATES UR QL SCN: NEGATIVE
BASE EXCESS BLDA CALC-SCNC: 3.1 MMOL/L (ref 0–2)
BASOPHILS # BLD AUTO: 0.08 10*3/MM3 (ref 0–0.2)
BASOPHILS # BLD AUTO: 0.12 10*3/MM3 (ref 0–0.2)
BASOPHILS NFR BLD AUTO: 0.8 % (ref 0–1.5)
BASOPHILS NFR BLD AUTO: 1 % (ref 0–1.5)
BDY SITE: ABNORMAL
BENZODIAZ UR QL SCN: NEGATIVE
BILIRUB SERPL-MCNC: 0.4 MG/DL (ref 0–1.2)
BILIRUB SERPL-MCNC: <0.2 MG/DL (ref 0–1.2)
BILIRUB UR QL STRIP: NEGATIVE
BLD GP AB SCN SERPL QL: POSITIVE
BUN SERPL-MCNC: 14 MG/DL (ref 8–23)
BUN SERPL-MCNC: 22 MG/DL (ref 8–23)
BUN SERPL-MCNC: 27 MG/DL (ref 8–23)
BUN/CREAT SERPL: 21.2 (ref 7–25)
BUN/CREAT SERPL: 30.7 (ref 7–25)
BUN/CREAT SERPL: 31 (ref 7–25)
CALCIUM SPEC-SCNC: 8.1 MG/DL (ref 8.6–10.5)
CALCIUM SPEC-SCNC: 8.6 MG/DL (ref 8.6–10.5)
CALCIUM SPEC-SCNC: 9.5 MG/DL (ref 8.6–10.5)
CANNABINOIDS SERPL QL: NEGATIVE
CHLORIDE SERPL-SCNC: 100 MMOL/L (ref 98–107)
CHLORIDE SERPL-SCNC: 101 MMOL/L (ref 98–107)
CHLORIDE SERPL-SCNC: 93 MMOL/L (ref 98–107)
CLARITY UR: CLEAR
CO2 BLDA-SCNC: 28.4 MMOL/L (ref 23–27)
CO2 SERPL-SCNC: 22 MMOL/L (ref 22–29)
CO2 SERPL-SCNC: 22.9 MMOL/L (ref 22–29)
CO2 SERPL-SCNC: 27.5 MMOL/L (ref 22–29)
COCAINE UR QL: NEGATIVE
COLOR UR: YELLOW
CORTIS SERPL-MCNC: 11.2 MCG/DL
CREAT SERPL-MCNC: 0.66 MG/DL (ref 0.57–1)
CREAT SERPL-MCNC: 0.71 MG/DL (ref 0.57–1)
CREAT SERPL-MCNC: 0.88 MG/DL (ref 0.57–1)
D-LACTATE SERPL-SCNC: 0.9 MMOL/L (ref 0.5–2)
D-LACTATE SERPL-SCNC: 2.9 MMOL/L (ref 0.5–2)
DEPRECATED RDW RBC AUTO: 42.4 FL (ref 37–54)
DEPRECATED RDW RBC AUTO: 44.8 FL (ref 37–54)
DEPRECATED RDW RBC AUTO: 46.5 FL (ref 37–54)
DEVICE COMMENT: ABNORMAL
E AG RBC QL: NEGATIVE
EGFRCR SERPLBLD CKD-EPI 2021: 72.6 ML/MIN/1.73
EGFRCR SERPLBLD CKD-EPI 2021: 93.9 ML/MIN/1.73
EGFRCR SERPLBLD CKD-EPI 2021: 96.9 ML/MIN/1.73
EOSINOPHIL # BLD AUTO: 0.49 10*3/MM3 (ref 0–0.4)
EOSINOPHIL # BLD AUTO: 0.57 10*3/MM3 (ref 0–0.4)
EOSINOPHIL NFR BLD AUTO: 4.9 % (ref 0.3–6.2)
EOSINOPHIL NFR BLD AUTO: 5.1 % (ref 0.3–6.2)
ERYTHROCYTE [DISTWIDTH] IN BLOOD BY AUTOMATED COUNT: 12.9 % (ref 12.3–15.4)
ERYTHROCYTE [DISTWIDTH] IN BLOOD BY AUTOMATED COUNT: 13.3 % (ref 12.3–15.4)
ERYTHROCYTE [DISTWIDTH] IN BLOOD BY AUTOMATED COUNT: 13.3 % (ref 12.3–15.4)
ETHANOL BLD-MCNC: <10 MG/DL (ref 0–10)
ETHANOL UR QL: <0.01 %
FENTANYL UR-MCNC: POSITIVE NG/ML
GEN 5 2HR TROPONIN T REFLEX: <6 NG/L
GLOBULIN UR ELPH-MCNC: 2.7 GM/DL
GLOBULIN UR ELPH-MCNC: 2.9 GM/DL
GLUCOSE BLDC GLUCOMTR-MCNC: 132 MG/DL (ref 70–130)
GLUCOSE BLDC GLUCOMTR-MCNC: 70 MG/DL (ref 70–130)
GLUCOSE BLDC GLUCOMTR-MCNC: 71 MG/DL (ref 70–130)
GLUCOSE BLDC GLUCOMTR-MCNC: 75 MG/DL (ref 70–130)
GLUCOSE SERPL-MCNC: 109 MG/DL (ref 65–99)
GLUCOSE SERPL-MCNC: 234 MG/DL (ref 65–99)
GLUCOSE SERPL-MCNC: 89 MG/DL (ref 65–99)
GLUCOSE UR STRIP-MCNC: NEGATIVE MG/DL
HCO3 BLDA-SCNC: 27.2 MMOL/L (ref 22–28)
HCT VFR BLD AUTO: 33.3 % (ref 34–46.6)
HCT VFR BLD AUTO: 33.5 % (ref 34–46.6)
HCT VFR BLD AUTO: 34.3 % (ref 34–46.6)
HEMODILUTION: NO
HGB BLD-MCNC: 10.7 G/DL (ref 12–15.9)
HGB BLD-MCNC: 11.2 G/DL (ref 12–15.9)
HGB BLD-MCNC: 11.6 G/DL (ref 12–15.9)
HGB UR QL STRIP.AUTO: NEGATIVE
HOLD SPECIMEN: NORMAL
HOLD SPECIMEN: NORMAL
IMM GRANULOCYTES # BLD AUTO: 0.04 10*3/MM3 (ref 0–0.05)
IMM GRANULOCYTES # BLD AUTO: 0.05 10*3/MM3 (ref 0–0.05)
IMM GRANULOCYTES NFR BLD AUTO: 0.4 % (ref 0–0.5)
IMM GRANULOCYTES NFR BLD AUTO: 0.4 % (ref 0–0.5)
INHALED O2 CONCENTRATION: 100 %
INR PPP: 1.13 (ref 0.9–1.1)
KETONES UR QL STRIP: NEGATIVE
LEUKOCYTE ESTERASE UR QL STRIP.AUTO: NEGATIVE
LITTLE C: NEGATIVE
LYMPHOCYTES # BLD AUTO: 2.37 10*3/MM3 (ref 0.7–3.1)
LYMPHOCYTES # BLD AUTO: 3.12 10*3/MM3 (ref 0.7–3.1)
LYMPHOCYTES NFR BLD AUTO: 24.9 % (ref 19.6–45.3)
LYMPHOCYTES NFR BLD AUTO: 26.9 % (ref 19.6–45.3)
MAGNESIUM SERPL-MCNC: 2.1 MG/DL (ref 1.6–2.4)
MCH RBC QN AUTO: 30.3 PG (ref 26.6–33)
MCH RBC QN AUTO: 30.4 PG (ref 26.6–33)
MCH RBC QN AUTO: 31.2 PG (ref 26.6–33)
MCHC RBC AUTO-ENTMCNC: 32.1 G/DL (ref 31.5–35.7)
MCHC RBC AUTO-ENTMCNC: 33.4 G/DL (ref 31.5–35.7)
MCHC RBC AUTO-ENTMCNC: 33.8 G/DL (ref 31.5–35.7)
MCV RBC AUTO: 91 FL (ref 79–97)
MCV RBC AUTO: 92.2 FL (ref 79–97)
MCV RBC AUTO: 94.3 FL (ref 79–97)
METHADONE UR QL SCN: NEGATIVE
MODALITY: ABNORMAL
MONOCYTES # BLD AUTO: 0.53 10*3/MM3 (ref 0.1–0.9)
MONOCYTES # BLD AUTO: 0.79 10*3/MM3 (ref 0.1–0.9)
MONOCYTES NFR BLD AUTO: 5.6 % (ref 5–12)
MONOCYTES NFR BLD AUTO: 6.8 % (ref 5–12)
NEUTROPHILS NFR BLD AUTO: 6.02 10*3/MM3 (ref 1.7–7)
NEUTROPHILS NFR BLD AUTO: 6.95 10*3/MM3 (ref 1.7–7)
NEUTROPHILS NFR BLD AUTO: 60 % (ref 42.7–76)
NEUTROPHILS NFR BLD AUTO: 63.2 % (ref 42.7–76)
NITRITE UR QL STRIP: NEGATIVE
NRBC BLD AUTO-RTO: 0 /100 WBC (ref 0–0.2)
NRBC BLD AUTO-RTO: 0 /100 WBC (ref 0–0.2)
NT-PROBNP SERPL-MCNC: <36 PG/ML (ref 0–900)
O2 A-A PPRESDIFF RESPIRATORY: 0.7 MMHG
OPIATES UR QL: NEGATIVE
OXYCODONE UR QL SCN: NEGATIVE
PCO2 BLDA: 38.8 MM HG (ref 35–45)
PEEP RESPIRATORY: 5 CM[H2O]
PH BLDA: 7.45 PH UNITS (ref 7.35–7.45)
PH UR STRIP.AUTO: 6 [PH] (ref 5–8)
PLATELET # BLD AUTO: 340 10*3/MM3 (ref 140–450)
PLATELET # BLD AUTO: 442 10*3/MM3 (ref 140–450)
PLATELET # BLD AUTO: 479 10*3/MM3 (ref 140–450)
PMV BLD AUTO: 8.8 FL (ref 6–12)
PMV BLD AUTO: 8.9 FL (ref 6–12)
PMV BLD AUTO: 9.1 FL (ref 6–12)
PO2 BLD: 504 MM[HG] (ref 0–500)
PO2 BLDA: 504.1 MM HG (ref 80–100)
POTASSIUM SERPL-SCNC: 3 MMOL/L (ref 3.5–5.2)
POTASSIUM SERPL-SCNC: 3.5 MMOL/L (ref 3.5–5.2)
POTASSIUM SERPL-SCNC: 3.6 MMOL/L (ref 3.5–5.2)
PROT SERPL-MCNC: 6.7 G/DL (ref 6–8.5)
PROT SERPL-MCNC: 6.8 G/DL (ref 6–8.5)
PROT UR QL STRIP: NEGATIVE
PROTHROMBIN TIME: 14.7 SECONDS (ref 11.7–14.2)
QT INTERVAL: 396 MS
QTC INTERVAL: 482 MS
RBC # BLD AUTO: 3.53 10*6/MM3 (ref 3.77–5.28)
RBC # BLD AUTO: 3.68 10*6/MM3 (ref 3.77–5.28)
RBC # BLD AUTO: 3.72 10*6/MM3 (ref 3.77–5.28)
RH BLD: POSITIVE
SAO2 % BLDCOA: 100 % (ref 92–98.5)
SET MECH RESP RATE: 18
SODIUM SERPL-SCNC: 134 MMOL/L (ref 136–145)
SODIUM SERPL-SCNC: 136 MMOL/L (ref 136–145)
SODIUM SERPL-SCNC: 137 MMOL/L (ref 136–145)
SP GR UR STRIP: 1.01 (ref 1–1.03)
T&S EXPIRATION DATE: NORMAL
T4 FREE SERPL-MCNC: 1.35 NG/DL (ref 0.92–1.68)
TOTAL RATE: 18 BREATHS/MINUTE
TROPONIN T DELTA: NORMAL
TROPONIN T SERPL HS-MCNC: 7 NG/L
TSH SERPL DL<=0.05 MIU/L-ACNC: 4.51 UIU/ML (ref 0.27–4.2)
UROBILINOGEN UR QL STRIP: NORMAL
VENTILATOR MODE: AC
VT ON VENT VENT: 500 ML
WBC NRBC COR # BLD AUTO: 11.6 10*3/MM3 (ref 3.4–10.8)
WBC NRBC COR # BLD AUTO: 13.41 10*3/MM3 (ref 3.4–10.8)
WBC NRBC COR # BLD AUTO: 9.53 10*3/MM3 (ref 3.4–10.8)
WHOLE BLOOD HOLD COAG: NORMAL
WHOLE BLOOD HOLD SPECIMEN: NORMAL

## 2024-11-13 PROCEDURE — 70498 CT ANGIOGRAPHY NECK: CPT

## 2024-11-13 PROCEDURE — 86850 RBC ANTIBODY SCREEN: CPT | Performed by: EMERGENCY MEDICINE

## 2024-11-13 PROCEDURE — 83880 ASSAY OF NATRIURETIC PEPTIDE: CPT | Performed by: EMERGENCY MEDICINE

## 2024-11-13 PROCEDURE — 85027 COMPLETE CBC AUTOMATED: CPT | Performed by: INTERNAL MEDICINE

## 2024-11-13 PROCEDURE — 85025 COMPLETE CBC W/AUTO DIFF WBC: CPT | Performed by: EMERGENCY MEDICINE

## 2024-11-13 PROCEDURE — 0042T HC CT CEREBRAL PERFUSION W/WO CONTRAST: CPT

## 2024-11-13 PROCEDURE — 94799 UNLISTED PULMONARY SVC/PX: CPT

## 2024-11-13 PROCEDURE — 86900 BLOOD TYPING SEROLOGIC ABO: CPT | Performed by: EMERGENCY MEDICINE

## 2024-11-13 PROCEDURE — 71045 X-RAY EXAM CHEST 1 VIEW: CPT

## 2024-11-13 PROCEDURE — 99291 CRITICAL CARE FIRST HOUR: CPT

## 2024-11-13 PROCEDURE — 80307 DRUG TEST PRSMV CHEM ANLYZR: CPT | Performed by: EMERGENCY MEDICINE

## 2024-11-13 PROCEDURE — 36600 WITHDRAWAL OF ARTERIAL BLOOD: CPT | Performed by: EMERGENCY MEDICINE

## 2024-11-13 PROCEDURE — 93005 ELECTROCARDIOGRAM TRACING: CPT | Performed by: EMERGENCY MEDICINE

## 2024-11-13 PROCEDURE — 70496 CT ANGIOGRAPHY HEAD: CPT

## 2024-11-13 PROCEDURE — 25010000002 FENTANYL CITRATE (PF) 50 MCG/ML SOLUTION: Performed by: EMERGENCY MEDICINE

## 2024-11-13 PROCEDURE — 82077 ASSAY SPEC XCP UR&BREATH IA: CPT | Performed by: EMERGENCY MEDICINE

## 2024-11-13 PROCEDURE — 85025 COMPLETE CBC W/AUTO DIFF WBC: CPT

## 2024-11-13 PROCEDURE — 94002 VENT MGMT INPAT INIT DAY: CPT

## 2024-11-13 PROCEDURE — 82948 REAGENT STRIP/BLOOD GLUCOSE: CPT

## 2024-11-13 PROCEDURE — 25010000002 NALOXONE HCL 2 MG/2ML SOLUTION PREFILLED SYRINGE

## 2024-11-13 PROCEDURE — 85730 THROMBOPLASTIN TIME PARTIAL: CPT | Performed by: EMERGENCY MEDICINE

## 2024-11-13 PROCEDURE — 86901 BLOOD TYPING SEROLOGIC RH(D): CPT | Performed by: EMERGENCY MEDICINE

## 2024-11-13 PROCEDURE — 86870 RBC ANTIBODY IDENTIFICATION: CPT | Performed by: EMERGENCY MEDICINE

## 2024-11-13 PROCEDURE — 82533 TOTAL CORTISOL: CPT | Performed by: INTERNAL MEDICINE

## 2024-11-13 PROCEDURE — 95819 EEG AWAKE AND ASLEEP: CPT

## 2024-11-13 PROCEDURE — 84484 ASSAY OF TROPONIN QUANT: CPT | Performed by: EMERGENCY MEDICINE

## 2024-11-13 PROCEDURE — 86902 BLOOD TYPE ANTIGEN DONOR EA: CPT

## 2024-11-13 PROCEDURE — 93010 ELECTROCARDIOGRAM REPORT: CPT | Performed by: INTERNAL MEDICINE

## 2024-11-13 PROCEDURE — 83735 ASSAY OF MAGNESIUM: CPT | Performed by: EMERGENCY MEDICINE

## 2024-11-13 PROCEDURE — 81003 URINALYSIS AUTO W/O SCOPE: CPT | Performed by: EMERGENCY MEDICINE

## 2024-11-13 PROCEDURE — P9612 CATHETERIZE FOR URINE SPEC: HCPCS

## 2024-11-13 PROCEDURE — 86905 BLOOD TYPING RBC ANTIGENS: CPT | Performed by: EMERGENCY MEDICINE

## 2024-11-13 PROCEDURE — 82803 BLOOD GASES ANY COMBINATION: CPT | Performed by: EMERGENCY MEDICINE

## 2024-11-13 PROCEDURE — 31500 INSERT EMERGENCY AIRWAY: CPT

## 2024-11-13 PROCEDURE — 83605 ASSAY OF LACTIC ACID: CPT | Performed by: EMERGENCY MEDICINE

## 2024-11-13 PROCEDURE — 25010000002 NALOXONE HCL 2 MG/2ML SOLUTION PREFILLED SYRINGE: Performed by: EMERGENCY MEDICINE

## 2024-11-13 PROCEDURE — 25010000002 FENTANYL CITRATE (PF) 50 MCG/ML SOLUTION

## 2024-11-13 PROCEDURE — 0BH17EZ INSERTION OF ENDOTRACHEAL AIRWAY INTO TRACHEA, VIA NATURAL OR ARTIFICIAL OPENING: ICD-10-PCS | Performed by: EMERGENCY MEDICINE

## 2024-11-13 PROCEDURE — 84439 ASSAY OF FREE THYROXINE: CPT | Performed by: EMERGENCY MEDICINE

## 2024-11-13 PROCEDURE — 84443 ASSAY THYROID STIM HORMONE: CPT | Performed by: EMERGENCY MEDICINE

## 2024-11-13 PROCEDURE — 80053 COMPREHEN METABOLIC PANEL: CPT | Performed by: EMERGENCY MEDICINE

## 2024-11-13 PROCEDURE — 25010000002 ENOXAPARIN PER 10 MG: Performed by: INTERNAL MEDICINE

## 2024-11-13 PROCEDURE — 5A1935Z RESPIRATORY VENTILATION, LESS THAN 24 CONSECUTIVE HOURS: ICD-10-PCS | Performed by: EMERGENCY MEDICINE

## 2024-11-13 PROCEDURE — 86922 COMPATIBILITY TEST ANTIGLOB: CPT

## 2024-11-13 PROCEDURE — 25010000002 PROPOFOL 10 MG/ML EMULSION: Performed by: EMERGENCY MEDICINE

## 2024-11-13 PROCEDURE — 94761 N-INVAS EAR/PLS OXIMETRY MLT: CPT

## 2024-11-13 PROCEDURE — 86920 COMPATIBILITY TEST SPIN: CPT

## 2024-11-13 PROCEDURE — 85610 PROTHROMBIN TIME: CPT | Performed by: EMERGENCY MEDICINE

## 2024-11-13 PROCEDURE — 36415 COLL VENOUS BLD VENIPUNCTURE: CPT

## 2024-11-13 PROCEDURE — 25510000001 IOPAMIDOL PER 1 ML: Performed by: EMERGENCY MEDICINE

## 2024-11-13 PROCEDURE — 99222 1ST HOSP IP/OBS MODERATE 55: CPT | Performed by: PSYCHIATRY & NEUROLOGY

## 2024-11-13 RX ORDER — LISINOPRIL 10 MG/1
1 TABLET ORAL DAILY
COMMUNITY
Start: 2024-11-08

## 2024-11-13 RX ORDER — FENTANYL CITRATE 50 UG/ML
100 INJECTION, SOLUTION INTRAMUSCULAR; INTRAVENOUS ONCE
Status: COMPLETED | OUTPATIENT
Start: 2024-11-13 | End: 2024-11-13

## 2024-11-13 RX ORDER — DEXTROSE MONOHYDRATE 25 G/50ML
50 INJECTION, SOLUTION INTRAVENOUS
Status: DISCONTINUED | OUTPATIENT
Start: 2024-11-13 | End: 2024-11-14 | Stop reason: HOSPADM

## 2024-11-13 RX ORDER — FUROSEMIDE 40 MG/1
1 TABLET ORAL DAILY
COMMUNITY
Start: 2024-10-06

## 2024-11-13 RX ORDER — GABAPENTIN 800 MG/1
800 TABLET ORAL 3 TIMES DAILY
COMMUNITY
Start: 2024-11-11

## 2024-11-13 RX ORDER — FENTANYL CITRATE 50 UG/ML
INJECTION, SOLUTION INTRAMUSCULAR; INTRAVENOUS
Status: COMPLETED
Start: 2024-11-13 | End: 2024-11-13

## 2024-11-13 RX ORDER — PANTOPRAZOLE SODIUM 40 MG/1
1 TABLET, DELAYED RELEASE ORAL EVERY 12 HOURS SCHEDULED
COMMUNITY
Start: 2024-10-28

## 2024-11-13 RX ORDER — LEVOTHYROXINE SODIUM 150 UG/1
150 TABLET ORAL
COMMUNITY
Start: 2024-08-07

## 2024-11-13 RX ORDER — ACETAMINOPHEN 325 MG/1
650 TABLET ORAL EVERY 4 HOURS PRN
Status: DISCONTINUED | OUTPATIENT
Start: 2024-11-13 | End: 2024-11-14 | Stop reason: HOSPADM

## 2024-11-13 RX ORDER — ENOXAPARIN SODIUM 100 MG/ML
40 INJECTION SUBCUTANEOUS NIGHTLY
Status: DISCONTINUED | OUTPATIENT
Start: 2024-11-13 | End: 2024-11-14

## 2024-11-13 RX ORDER — ROSUVASTATIN CALCIUM 5 MG/1
1 TABLET, COATED ORAL DAILY
COMMUNITY
Start: 2024-09-25

## 2024-11-13 RX ORDER — NALOXONE HYDROCHLORIDE 1 MG/ML
INJECTION INTRAMUSCULAR; INTRAVENOUS; SUBCUTANEOUS
Status: COMPLETED
Start: 2024-11-13 | End: 2024-11-13

## 2024-11-13 RX ORDER — NALOXONE HYDROCHLORIDE 1 MG/ML
2 INJECTION INTRAMUSCULAR; INTRAVENOUS; SUBCUTANEOUS ONCE
Status: COMPLETED | OUTPATIENT
Start: 2024-11-13 | End: 2024-11-13

## 2024-11-13 RX ORDER — NITROGLYCERIN 0.4 MG/1
0.4 TABLET SUBLINGUAL
Status: DISCONTINUED | OUTPATIENT
Start: 2024-11-13 | End: 2024-11-14

## 2024-11-13 RX ORDER — KETOROLAC TROMETHAMINE 15 MG/ML
15 INJECTION, SOLUTION INTRAMUSCULAR; INTRAVENOUS ONCE
Status: COMPLETED | OUTPATIENT
Start: 2024-11-14 | End: 2024-11-14

## 2024-11-13 RX ORDER — POLYETHYLENE GLYCOL 3350 17 G/17G
17 POWDER, FOR SOLUTION ORAL DAILY PRN
Status: DISCONTINUED | OUTPATIENT
Start: 2024-11-13 | End: 2024-11-14

## 2024-11-13 RX ORDER — APIXABAN 2.5 MG/1
1 TABLET, FILM COATED ORAL EVERY 12 HOURS SCHEDULED
COMMUNITY
Start: 2024-11-08

## 2024-11-13 RX ORDER — HYDROCHLOROTHIAZIDE 25 MG/1
1 TABLET ORAL EVERY 12 HOURS SCHEDULED
COMMUNITY
Start: 2024-08-27 | End: 2024-11-14 | Stop reason: HOSPADM

## 2024-11-13 RX ORDER — DEXMEDETOMIDINE HYDROCHLORIDE 4 UG/ML
.2-1.5 INJECTION, SOLUTION INTRAVENOUS
Status: DISCONTINUED | OUTPATIENT
Start: 2024-11-13 | End: 2024-11-14

## 2024-11-13 RX ORDER — BISACODYL 5 MG/1
5 TABLET, DELAYED RELEASE ORAL DAILY PRN
Status: DISCONTINUED | OUTPATIENT
Start: 2024-11-13 | End: 2024-11-14

## 2024-11-13 RX ORDER — DULOXETIN HYDROCHLORIDE 60 MG/1
1 CAPSULE, DELAYED RELEASE ORAL EVERY 12 HOURS SCHEDULED
COMMUNITY
Start: 2024-10-02

## 2024-11-13 RX ORDER — POTASSIUM CHLORIDE 750 MG/1
1 TABLET, EXTENDED RELEASE ORAL DAILY
COMMUNITY
Start: 2024-11-08

## 2024-11-13 RX ORDER — DEXTROSE MONOHYDRATE 25 G/50ML
INJECTION, SOLUTION INTRAVENOUS
Status: COMPLETED
Start: 2024-11-13 | End: 2024-11-13

## 2024-11-13 RX ORDER — SODIUM CHLORIDE 0.9 % (FLUSH) 0.9 %
10 SYRINGE (ML) INJECTION AS NEEDED
Status: DISCONTINUED | OUTPATIENT
Start: 2024-11-13 | End: 2024-11-14 | Stop reason: HOSPADM

## 2024-11-13 RX ORDER — FERROUS GLUCONATE 324(38)MG
1 TABLET ORAL DAILY
COMMUNITY
Start: 2024-10-13 | End: 2024-11-14 | Stop reason: HOSPADM

## 2024-11-13 RX ORDER — ACETAMINOPHEN 650 MG/1
650 SUPPOSITORY RECTAL EVERY 4 HOURS PRN
Status: DISCONTINUED | OUTPATIENT
Start: 2024-11-13 | End: 2024-11-14 | Stop reason: HOSPADM

## 2024-11-13 RX ORDER — PANTOPRAZOLE SODIUM 40 MG/10ML
40 INJECTION, POWDER, LYOPHILIZED, FOR SOLUTION INTRAVENOUS
Status: DISCONTINUED | OUTPATIENT
Start: 2024-11-13 | End: 2024-11-14

## 2024-11-13 RX ORDER — HYDROXYZINE HYDROCHLORIDE 25 MG/1
1-2 TABLET, FILM COATED ORAL 3 TIMES DAILY PRN
COMMUNITY
Start: 2024-10-14

## 2024-11-13 RX ORDER — ASPIRIN 81 MG/1
1 TABLET ORAL DAILY
COMMUNITY
Start: 2024-10-15

## 2024-11-13 RX ORDER — ENOXAPARIN SODIUM 100 MG/ML
30 INJECTION SUBCUTANEOUS NIGHTLY
Status: DISCONTINUED | OUTPATIENT
Start: 2024-11-13 | End: 2024-11-13

## 2024-11-13 RX ORDER — AMOXICILLIN 250 MG
2 CAPSULE ORAL 2 TIMES DAILY
Status: DISCONTINUED | OUTPATIENT
Start: 2024-11-13 | End: 2024-11-14

## 2024-11-13 RX ORDER — IOPAMIDOL 755 MG/ML
150 INJECTION, SOLUTION INTRAVASCULAR
Status: COMPLETED | OUTPATIENT
Start: 2024-11-13 | End: 2024-11-13

## 2024-11-13 RX ORDER — CELECOXIB 200 MG/1
200 CAPSULE ORAL 2 TIMES DAILY
COMMUNITY

## 2024-11-13 RX ORDER — SODIUM CHLORIDE 0.9 % (FLUSH) 0.9 %
10 SYRINGE (ML) INJECTION EVERY 12 HOURS SCHEDULED
Status: DISCONTINUED | OUTPATIENT
Start: 2024-11-13 | End: 2024-11-14 | Stop reason: HOSPADM

## 2024-11-13 RX ORDER — OXYCODONE AND ACETAMINOPHEN 10; 325 MG/1; MG/1
1 TABLET ORAL
COMMUNITY
Start: 2024-10-16

## 2024-11-13 RX ORDER — ROCURONIUM BROMIDE 10 MG/ML
70 INJECTION, SOLUTION INTRAVENOUS ONCE
Status: COMPLETED | OUTPATIENT
Start: 2024-11-13 | End: 2024-11-13

## 2024-11-13 RX ORDER — BISACODYL 10 MG
10 SUPPOSITORY, RECTAL RECTAL DAILY PRN
Status: DISCONTINUED | OUTPATIENT
Start: 2024-11-13 | End: 2024-11-14

## 2024-11-13 RX ORDER — ENOXAPARIN SODIUM 100 MG/ML
30 INJECTION SUBCUTANEOUS DAILY
Status: DISCONTINUED | OUTPATIENT
Start: 2024-11-13 | End: 2024-11-13

## 2024-11-13 RX ORDER — CHLORHEXIDINE GLUCONATE ORAL RINSE 1.2 MG/ML
15 SOLUTION DENTAL EVERY 12 HOURS SCHEDULED
Status: DISCONTINUED | OUTPATIENT
Start: 2024-11-13 | End: 2024-11-14 | Stop reason: HOSPADM

## 2024-11-13 RX ORDER — CLONAZEPAM 0.5 MG/1
0.5 TABLET ORAL DAILY PRN
COMMUNITY
Start: 2024-10-15

## 2024-11-13 RX ORDER — IPRATROPIUM BROMIDE AND ALBUTEROL SULFATE 2.5; .5 MG/3ML; MG/3ML
3 SOLUTION RESPIRATORY (INHALATION) EVERY 6 HOURS PRN
Status: DISCONTINUED | OUTPATIENT
Start: 2024-11-13 | End: 2024-11-14

## 2024-11-13 RX ORDER — ETOMIDATE 2 MG/ML
20 INJECTION INTRAVENOUS ONCE
Status: COMPLETED | OUTPATIENT
Start: 2024-11-13 | End: 2024-11-13

## 2024-11-13 RX ORDER — DOXEPIN HYDROCHLORIDE 100 MG/1
100 CAPSULE ORAL NIGHTLY
COMMUNITY
Start: 2024-10-23

## 2024-11-13 RX ORDER — ZOLPIDEM TARTRATE 10 MG/1
10 TABLET ORAL NIGHTLY PRN
COMMUNITY
Start: 2024-10-25

## 2024-11-13 RX ORDER — SODIUM CHLORIDE 9 MG/ML
40 INJECTION, SOLUTION INTRAVENOUS AS NEEDED
Status: DISCONTINUED | OUTPATIENT
Start: 2024-11-13 | End: 2024-11-14 | Stop reason: HOSPADM

## 2024-11-13 RX ORDER — ONDANSETRON 2 MG/ML
4 INJECTION INTRAMUSCULAR; INTRAVENOUS EVERY 6 HOURS PRN
Status: DISCONTINUED | OUTPATIENT
Start: 2024-11-13 | End: 2024-11-14

## 2024-11-13 RX ADMIN — ETOMIDATE 20 MG: 2 INJECTION, SOLUTION INTRAVENOUS at 01:34

## 2024-11-13 RX ADMIN — PROPOFOL INJECTABLE EMULSION 5 MCG/KG/MIN: 10 INJECTION, EMULSION INTRAVENOUS at 03:16

## 2024-11-13 RX ADMIN — IOPAMIDOL 150 ML: 755 INJECTION, SOLUTION INTRAVENOUS at 02:21

## 2024-11-13 RX ADMIN — FENTANYL CITRATE 100 MCG: 50 INJECTION, SOLUTION INTRAMUSCULAR; INTRAVENOUS at 03:12

## 2024-11-13 RX ADMIN — FENTANYL CITRATE 100 MCG: 50 INJECTION, SOLUTION INTRAMUSCULAR; INTRAVENOUS at 01:56

## 2024-11-13 RX ADMIN — MUPIROCIN 1 APPLICATION: 20 OINTMENT TOPICAL at 06:49

## 2024-11-13 RX ADMIN — ROCURONIUM BROMIDE 70 MG: 10 INJECTION, SOLUTION INTRAVENOUS at 01:37

## 2024-11-13 RX ADMIN — Medication 10 ML: at 20:19

## 2024-11-13 RX ADMIN — 0.12% CHLORHEXIDINE GLUCONATE 15 ML: 1.2 RINSE ORAL at 20:19

## 2024-11-13 RX ADMIN — ENOXAPARIN SODIUM 40 MG: 100 INJECTION SUBCUTANEOUS at 20:19

## 2024-11-13 RX ADMIN — DEXTROSE MONOHYDRATE 50 ML: 25 INJECTION, SOLUTION INTRAVENOUS at 06:05

## 2024-11-13 RX ADMIN — MUPIROCIN 1 APPLICATION: 20 OINTMENT TOPICAL at 20:19

## 2024-11-13 RX ADMIN — NALOXONE HYDROCHLORIDE 2 MG: 1 INJECTION PARENTERAL at 01:28

## 2024-11-13 NOTE — PROGRESS NOTES
Patient was seen by Dr. Garvin earlier today  She is gradually waking up, and after discussing with neurologist, this is felt to be a toxic/metabolic encephalopathy most likely drug-induced and the patient is gradually improving  She had weaning parameters on the low-dose sedation and she is following commands and have favorable weaning parameters and we will proceed with extubation and liberation from the ventilator.  On exam the patient was moving good air in and out, moving all extremities and following commands.  Will monitor in the ICU postextubation    Discussed with the neurologist  Discussed with the ICU rounding team  Discussed with the family

## 2024-11-13 NOTE — H&P
Group: [facility]        HISTORY AND PHYSICAL    Patient Identification:  Paola Reid  64 y.o.  female  1960  [id number]            CC: Acute altered mental status    History of Present Illness:  Paola Reid is a 64-year-old female with a past medical history of anemia, anxiety, chronic pain, hypothyroidism, hypogammaglobinemia, rheumatoid arthritis.    She follows with Dr. Carter with hematology oncology for her hypogammaglobulinemia-receives Gamunex injections every 2 weeks.  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  She is on a biologic agent, Cimzia for her RA-followed by Dr. Ofelia Tinajero.    Patient presented to the emergency department on 11/13/2024 after being found unresponsive by her caretaker.  Last known normal around midnight.  EMS gave 2 mg of intranasal Narcan prior to ED presentation without improvement.  Patient was still only responsive to pain upon arrival to the ER, however was oxygenating appropriately.  Glascow coma listed as a 5 (E2/V1/M2).  Patient herself unable to provide review systems.  ED workup was largely unremarkable-TSH baseline 4.510, free T41.35, WBC 11.60 chest x-ray showed no acute infiltrate or consolidation.  ABG on ventilator was unremarkable.  Neurology was called and stroke imaging was performed.  Dr. Pyle reviewed images with the ER provider, no evidence of acute infarct or ischemia.    Patient was intubated in the emergency department due to concern for altered mental status and protection of airway.  She is admitted to the intensive care unit for further workup.    Review of Systems:  Unable to obtain, intubated and sedated.    Past Medical History:  No past medical history on file.    Past Surgical History:  No past surgical history on file.     Home Meds:  (Not in a hospital  "admission)      Allergies:  Not on File    Social History:   Social History     Socioeconomic History    Marital status: Single       Family History:  No family history on file.    Physical Exam:  /90   Pulse 84   Temp 96.9 °F (36.1 °C) (Tympanic)   Resp 18   Wt 73.1 kg (161 lb 2.5 oz)   SpO2 90%  There is no height or weight on file to calculate BMI. 90% 73.1 kg (161 lb 2.5 oz)  Constitutional: Middle aged, chronically ill-appearing female pt in bed, No acute respiratory distress, no accessory muscle use, intubated and sedated  Head: - NCAT  Eyes: No pallor, Anicteric conjunctiva, EOMI.  ENMT:  Mallampati 3, no oral thrush. Dry MM.   NECK: Trachea midline, No thyromegaly, no palpable cervical LNpathy, no JVD  Heart: RRR, no murmur. No pedal edema   Lungs: CANDIDO +, No wheezes/ crackles heard    Abdomen: Soft. No tenderness, guarding or rigidity. No palpable masses  Extremities: Extremities warm and well perfused. No cyanosis/ clubbing  Neuro: Responds to painful stimuli      PPE recommended per [facility] infectious disease Isolation protocol for the current clinical scenario(as mentioned below) was followed.      LABS:  No results found for: \"COVID19\"    Lab Results   Component Value Date    CALCIUM 8.6 11/13/2024     Results from last 7 days   Lab Units 11/13/24  0126   MAGNESIUM mg/dL 2.1   SODIUM mmol/L 136   POTASSIUM mmol/L 3.6   CHLORIDE mmol/L 100   CO2 mmol/L 22.9   BUN mg/dL 27*   CREATININE mg/dL 0.88   GLUCOSE mg/dL 109*   CALCIUM mg/dL 8.6   WBC 10*3/mm3 11.60*   HEMOGLOBIN g/dL 10.7*   PLATELETS 10*3/mm3 479*   ALT (SGPT) U/L 12   AST (SGOT) U/L 15   PROBNP pg/mL <36.0     Lab Results   Component Value Date    TROPONINT 7 11/13/2024     Results from last 7 days   Lab Units 11/13/24  0126   HSTROP T ng/L 7             Results from last 7 days   Lab Units 11/13/24  0301   PH, ARTERIAL pH units 7.454*   PCO2, ARTERIAL mm Hg 38.8   PO2 ART mm Hg 504.1*   O2 SATURATION ART % 100.0*   MODALITY "  Adult Vent         Results from last 7 days   Lab Units 11/13/24  0126   INR  1.13*         Lab Results   Component Value Date    TSH 4.510 (H) 11/13/2024     CrCl cannot be calculated (Unknown ideal weight.).  Results from last 7 days   Lab Units 11/13/24  0258   NITRITE UA  Negative        Imaging: I personally visualized the images of scans/x-rays performed within last 3 days.      Assessment:  Altered mental status  Acute respiratory failure, intubated for airway protection  Management of mechanical ventilation  Chronic pain  Hypothyroidism  Hypertension  Hypogammaglobinemia  Rheumatoid arthritis  Anemia  Anxiety  Insomnia  Recent right knee arthroplasty (10/30/2024)    Recommendations:  Altered mental status  Neurology consulted from the emergency department to assist in evaluation.  Stroke imaging was negative for acute ischemia or infarct.  Urine drug screen ordered, pending collection.  Ethanol level ordered, pending collection.  ABG showed no evidence of acute hypercapnia.  Blood glucose within normal limits.  Urinalysis unremarkable.    Acute respiratory failure, intubated for airway protection  Management of mechanical ventilation  Post intubation ABG reviewed, changes made.  Ventilator bundle initiated.  Propofol for sedation, fentanyl as needed for analgesia, pantoprazole for stress ulcer prophylaxis.  Daily SAT/SBT.  Daily ABG and chest x-ray.    Chronic pain  Home medication: Celebrex, gabapentin, Norco, Percocet, flexeril- hold.  Unclear if patient had an overdose of medications- outside chart documents history of overdoses. Narcan was ineffective in improving mental status.    Hypothyroidism  TSH 4.510, free T41.35, allergy to levothyroxine    Hypertension  Home medication: Lasix and hydrochlorothiazide, hold.    Hypogammaglobinemia  Patient follows with hematology/oncology, receives Gamunex injections every 2 weeks.  Plan was for this to be on hold for about 6 weeks after her knee  surgery.    Rheumatoid arthritis  Follows with Dr. Ofelia Tinajero-is maintained on Cimzia    Anemia  Hemoglobin 10.7-no evidence of acute bleeding.  No indication to transfuse.    Anxiety/insomnia  Home medications: Abilify, clonazepam, doxepin, zolpidem, on hold.    Recent right knee arthroplasty (10/30/2024)    **Note: Patient has a duplicate chart but is marked for merge, information obtained through review of both charts.    NPO  Full code  Lovenox for VTE prophylaxis (was on Eliquis for four weeks following knee surgery for VTE prophylaxis)    Need to reconcile home medications once input/reviewed by staff.    Patient was placed in face mask upon entering room and kept mask on throughout our encounter. I wore full protective equipment throughout this patient encounter including a face mask, gown and gloves. Hand hygiene was performed before donning protective equipment and after removal when leaving the room.    Anneliese Francisco, APRN  11/13/2024  04:04 EST      Much of this encounter note is an electronic transcription/translation of spoken language to printed text using Dragon Software.

## 2024-11-13 NOTE — CASE COMMUNICATION
Anticipate OT 2w1, 1w1 for IADL, home safety, falls prevention, monitor vital signs, including pulse oximetry, hand/ upper extremity function/range of motion/strength, therapeutic exercise, safety in home, and improve endurance and fatigue management with self care, and functional mobility with durable medical equipment as necessary.

## 2024-11-13 NOTE — ED PROVIDER NOTES
EMERGENCY DEPARTMENT ENCOUNTER    Room Number:  15/15  PCP: Akil Mcnalyl DO  Historian: EMS      HPI:  Chief Complaint: Altered mental status  A complete HPI/ROS/PMH/PSH/SH/FH are unobtainable due to: Patient condition    Context: Paola Reid is a 64 y.o. female who presents to the ED via [facility] from home c/o acute altered mental status last known normal around midnight.  Caretaker reported finding her unresponsive.  EMS gave 2 mg intranasal Narcan prior to arrival without improvement.  Patient was 95% on room air but shallow breathing.  Some responsiveness to pain but nonverbal not following commands.  Patient on nonrebreather prior to arrival.  She is unable to provide any review of systems.      MEDICAL RECORD REVIEW    External (non-ED) record review: Op note reviewed from October 30, 2024 with Dr. Mora, at [place], patient underwent a total right knee arthroplasty              PAST MEDICAL HISTORY  Active Ambulatory Problems     Diagnosis Date Noted    No Active Ambulatory Problems     Resolved Ambulatory Problems     Diagnosis Date Noted    No Resolved Ambulatory Problems     No Additional Past Medical History         PAST SURGICAL HISTORY  No past surgical history on file.      FAMILY HISTORY  No family history on file.      SOCIAL HISTORY  Social History     Socioeconomic History    Marital status: Single         ALLERGIES  Patient has no allergy information on record.        REVIEW OF SYSTEMS  Review of Systems     All systems reviewed and negative except for those discussed in HPI.       PHYSICAL EXAM    I have reviewed the triage vital signs and nursing notes.    ED Triage Vitals [11/13/24 0118]   Temp Heart Rate Resp BP SpO2   -- 95 -- 103/60 100 %      Temp src Heart Rate Source Patient Position BP Location FiO2 (%)   -- -- -- -- --       Physical Exam  General: Toxic appearing, nondiaphoretic  HEENT: Mucous membranes dry, atraumatic, pupils 4 mm and minimally responsive, no  deviation  Neck: Full ROM  Pulm: Symmetric chest rise, BVM ventilations, CTAB  Cardiovascular: Regular rate and rhythm, intact distal pulses, no peripheral edema  GI: Soft, nontender, nondistended, no rebound, no guarding, bowel sounds present  MSK: Well-healing right knee surgical site with wound bandage in place  Skin: Warm, dry  Neuro: GCS 5 (E2/V1/M2), extensor posturing spontaneously and to pain  Psych: Calm, cooperative        LAB RESULTS  Recent Results (from the past 24 hours)   Comprehensive Metabolic Panel    Collection Time: 11/13/24  1:26 AM    Specimen: Blood   Result Value Ref Range    Glucose 109 (H) 65 - 99 mg/dL    BUN 27 (H) 8 - 23 mg/dL    Creatinine 0.88 0.57 - 1.00 mg/dL    Sodium 136 136 - 145 mmol/L    Potassium 3.6 3.5 - 5.2 mmol/L    Chloride 100 98 - 107 mmol/L    CO2 22.9 22.0 - 29.0 mmol/L    Calcium 8.6 8.6 - 10.5 mg/dL    Total Protein 6.7 6.0 - 8.5 g/dL    Albumin 4.0 3.5 - 5.2 g/dL    ALT (SGPT) 12 1 - 33 U/L    AST (SGOT) 15 1 - 32 U/L    Alkaline Phosphatase 84 39 - 117 U/L    Total Bilirubin <0.2 0.0 - 1.2 mg/dL    Globulin 2.7 gm/dL    A/G Ratio 1.5 g/dL    BUN/Creatinine Ratio 30.7 (H) 7.0 - 25.0    Anion Gap 13.1 5.0 - 15.0 mmol/L    eGFR 72.6 >60.0 mL/min/1.73   Protime-INR    Collection Time: 11/13/24  1:26 AM    Specimen: Blood   Result Value Ref Range    Protime 14.7 (H) 11.7 - 14.2 Seconds    INR 1.13 (H) 0.90 - 1.10   aPTT    Collection Time: 11/13/24  1:26 AM    Specimen: Blood   Result Value Ref Range    PTT 31.2 22.7 - 35.4 seconds   BNP    Collection Time: 11/13/24  1:26 AM    Specimen: Blood   Result Value Ref Range    proBNP <36.0 0.0 - 900.0 pg/mL   High Sensitivity Troponin T    Collection Time: 11/13/24  1:26 AM    Specimen: Blood   Result Value Ref Range    HS Troponin T 7 <14 ng/L   Magnesium    Collection Time: 11/13/24  1:26 AM    Specimen: Blood   Result Value Ref Range    Magnesium 2.1 1.6 - 2.4 mg/dL   CBC Auto Differential    Collection Time: 11/13/24   1:26 AM    Specimen: Blood   Result Value Ref Range    WBC 11.60 (H) 3.40 - 10.80 10*3/mm3    RBC 3.53 (L) 3.77 - 5.28 10*6/mm3    Hemoglobin 10.7 (L) 12.0 - 15.9 g/dL    Hematocrit 33.3 (L) 34.0 - 46.6 %    MCV 94.3 79.0 - 97.0 fL    MCH 30.3 26.6 - 33.0 pg    MCHC 32.1 31.5 - 35.7 g/dL    RDW 13.3 12.3 - 15.4 %    RDW-SD 46.5 37.0 - 54.0 fl    MPV 8.9 6.0 - 12.0 fL    Platelets 479 (H) 140 - 450 10*3/mm3    Neutrophil % 60.0 42.7 - 76.0 %    Lymphocyte % 26.9 19.6 - 45.3 %    Monocyte % 6.8 5.0 - 12.0 %    Eosinophil % 4.9 0.3 - 6.2 %    Basophil % 1.0 0.0 - 1.5 %    Immature Grans % 0.4 0.0 - 0.5 %    Neutrophils, Absolute 6.95 1.70 - 7.00 10*3/mm3    Lymphocytes, Absolute 3.12 (H) 0.70 - 3.10 10*3/mm3    Monocytes, Absolute 0.79 0.10 - 0.90 10*3/mm3    Eosinophils, Absolute 0.57 (H) 0.00 - 0.40 10*3/mm3    Basophils, Absolute 0.12 0.00 - 0.20 10*3/mm3    Immature Grans, Absolute 0.05 0.00 - 0.05 10*3/mm3    nRBC 0.0 0.0 - 0.2 /100 WBC   TSH Rfx On Abnormal To Free T4    Collection Time: 11/13/24  1:26 AM    Specimen: Blood   Result Value Ref Range    TSH 4.510 (H) 0.270 - 4.200 uIU/mL   T4, Free    Collection Time: 11/13/24  1:26 AM    Specimen: Blood   Result Value Ref Range    Free T4 1.35 0.92 - 1.68 ng/dL   Type & Screen    Collection Time: 11/13/24  2:26 AM    Specimen: Blood   Result Value Ref Range    ABO Type A     RH type Positive     Antibody Screen Positive     T&S Expiration Date 11/16/2024 11:59:59 PM    ECG 12 Lead Altered Mental Status    Collection Time: 11/13/24  2:29 AM   Result Value Ref Range    QT Interval 396 ms    QTC Interval 482 ms   Urinalysis With Microscopic If Indicated (No Culture) - Straight Cath    Collection Time: 11/13/24  2:58 AM    Specimen: Straight Cath; Urine   Result Value Ref Range    Color, UA Yellow Yellow, Straw    Appearance, UA Clear Clear    pH, UA 6.0 5.0 - 8.0    Specific Gravity, UA 1.014 1.005 - 1.030    Glucose, UA Negative Negative    Ketones, UA Negative  Negative    Bilirubin, UA Negative Negative    Blood, UA Negative Negative    Protein, UA Negative Negative    Leuk Esterase, UA Negative Negative    Nitrite, UA Negative Negative    Urobilinogen, UA 0.2 E.U./dL 0.2 - 1.0 E.U./dL   Blood Gas, Arterial -    Collection Time: 11/13/24  3:01 AM    Specimen: Arterial Blood   Result Value Ref Range    Site Left Brachial     Terry's Test N/A     pH, Arterial 7.454 (H) 7.350 - 7.450 pH units    pCO2, Arterial 38.8 35.0 - 45.0 mm Hg    pO2, Arterial 504.1 (H) 80.0 - 100.0 mm Hg    HCO3, Arterial 27.2 22.0 - 28.0 mmol/L    Base Excess, Arterial 3.1 (H) 0.0 - 2.0 mmol/L    O2 Saturation, Arterial 100.0 (H) 92.0 - 98.5 %    A-a DO2 0.7 mmHg    CO2 Content 28.4 (H) 23 - 27 mmol/L    Barometric Pressure for Blood Gas 755.2000 mmHg    Modality Adult Vent     FIO2 100 %    Ventilator Mode AC     Set Tidal Volume 500     Set Mech Resp Rate 18     Rate 18 Breaths/minute    PEEP 5     Hemodilution No     Device Comment SpO2 100%     PO2/FIO2 504 (H) 0 - 500       Ordered the above labs and independently interpreted results. My findings will be discussed in the medical decision making section below        RADIOLOGY  XR Chest 1 View    Result Date: 11/13/2024  SINGLE VIEW OF THE CHEST  HISTORY: Altered mental status  COMPARISON: None available.  FINDINGS: There is cardiomegaly. There is no vascular congestion. No pneumothorax or pleural effusion is seen. There appears to be scarring versus atelectasis at the lung bases. Linear hyperdensities are noted at the right lung base. Endotracheal tube terminates in satisfactory position. Right internal jugular vein Mediport appears to extend into the right atrium.      Endotracheal tube terminates in satisfactory position.  This report was finalized on 11/13/2024 2:08 AM by Dr. Tika Zavala M.D on Workstation: [id number]       Ordered the above noted radiological studies.  Independently interpreted by me and my independent review of  findings can be found in the ED Course.  See dictation for official radiology interpretation.      PROCEDURES    Intubation    Date/Time: 11/13/2024 1:44 AM    Performed by: Sohan Cross MD  Authorized by: Sohan Cross MD    Consent:     Consent obtained:  Emergent situation  Universal protocol:     Patient identity confirmed:  Anonymous protocol, patient vented/unresponsive  Pre-procedure details:     Indications: airway protection, altered consciousness and respiratory failure      Patient status:  Unresponsive    Look externally: no concerns      Obstruction: none      Pharmacologic strategy: RSI      Induction agents:  Etomidate    Paralytics:  Rocuronium  Procedure details:     Preoxygenation:  Bag valve mask    CPR in progress: no      Number of attempts:  2  Successful intubation attempt details:     Intubation method:  Oral    Intubation technique: video assisted      Laryngoscope blade:  Mac 4    Bougie used: no      Grade view: I      Tube size (mm):  7.0    Tube type:  Cuffed    Tube visualized through cords: yes    First unsuccessful intubation attempt details:     Intubation method:  Oral    Intubation technique:  Video assisted    Laryngoscope blade:  Mac 4    Bougie used: no      Grade view: I      Tube size (mm):  7.5    Tube type:  Cuffed    Ventilation between 1st and 2nd attempt: yes with extraglottic device      Tube visualized through cords: tube too large and would not pass the cords.    Placement assessment:     ETT at teeth/gumline (cm):  23    Tube secured with:  ETT peña    Breath sounds:  Equal and absent over the epigastrium    Placement verification: chest rise, colorimetric ETCO2, CXR verification, direct visualization, equal breath sounds and tube exhalation      CXR findings:  Appropriate position  Post-procedure details:     Procedure completion:  Tolerated well, no immediate complications  Critical Care    Performed by: Sohan Cross MD  Authorized by: Sohan Cross MD     Critical care provider statement:     Critical care time (minutes):  65    Critical care time was exclusive of:  Separately billable procedures and treating other patients and teaching time    Critical care was necessary to treat or prevent imminent or life-threatening deterioration of the following conditions:  CNS failure or compromise and respiratory failure    Critical care was time spent personally by me on the following activities:  Development of treatment plan with patient or surrogate, discussions with consultants, evaluation of patient's response to treatment, examination of patient, obtaining history from patient or surrogate, ordering and performing treatments and interventions, ordering and review of laboratory studies, ordering and review of radiographic studies, pulse oximetry, re-evaluation of patient's condition and review of old charts    Care discussed with: admitting provider      Care discussed with comment:  Dr. Omer, Stroke Neurology; EVELINE Lai, ICU, admitting team        EKG - Per my independent interpretation at 0235:    EKG Time: 0229  Rhythm/Rate: Sinus rhythm with rate of 89  Normal axis  Normal intervals  Isolated Q wave in lead III   No STEMI       No prior for visual comparison      MEDICATIONS GIVEN IN ER    Medications   sodium chloride 0.9 % flush 10 mL (has no administration in time range)   propofol (DIPRIVAN) infusion 10 mg/mL 100 mL (5 mcg/kg/min × 73.1 kg Intravenous New Bag 11/13/24 0316)   nitroglycerin (NITROSTAT) SL tablet 0.4 mg (has no administration in time range)   sodium chloride 0.9 % flush 10 mL (has no administration in time range)   sodium chloride 0.9 % flush 10 mL (has no administration in time range)   sodium chloride 0.9 % infusion 40 mL (has no administration in time range)   mupirocin (BACTROBAN) 2 % nasal ointment 1 Application (has no administration in time range)   sennosides-docusate (PERICOLACE) 8.6-50 MG per tablet 2 tablet (has no  administration in time range)     And   polyethylene glycol (MIRALAX) packet 17 g (has no administration in time range)     And   bisacodyl (DULCOLAX) EC tablet 5 mg (has no administration in time range)     And   bisacodyl (DULCOLAX) suppository 10 mg (has no administration in time range)   chlorhexidine (PERIDEX) 0.12 % solution 15 mL (has no administration in time range)   Enoxaparin Sodium (LOVENOX) syringe 30 mg (has no administration in time range)   pantoprazole (PROTONIX) injection 40 mg (has no administration in time range)   acetaminophen (TYLENOL) tablet 650 mg (has no administration in time range)     Or   acetaminophen (TYLENOL) suppository 650 mg (has no administration in time range)   ondansetron (ZOFRAN) injection 4 mg (has no administration in time range)   ipratropium-albuterol (DUO-NEB) nebulizer solution 3 mL (has no administration in time range)   Naloxone HCl (NARCAN) injection 2 mg (2 mg Intravenous Given 11/13/24 0128)   etomidate (AMIDATE) injection 20 mg (20 mg Intravenous Given 11/13/24 0134)   rocuronium (ZEMURON) injection 70 mg (70 mg Intravenous Given 11/13/24 0137)   fentaNYL citrate (PF) (SUBLIMAZE) injection 100 mcg (100 mcg Intravenous Given 11/13/24 0156)   iopamidol (ISOVUE-370) 76 % injection 150 mL (150 mL Intravenous Given 11/13/24 0221)   fentaNYL citrate (PF) (SUBLIMAZE) injection 100 mcg (100 mcg Intravenous Given 11/13/24 0312)         PROGRESS, DATA ANALYSIS, CONSULTS, AND MEDICAL DECISION MAKING    Please note that this section constitutes my independent interpretation of clinical data including lab results, radiology, EKG's.  This constitutes my independent professional opinion regarding differential diagnosis and management of this patient.  It may include any factors such as history from outside sources, review of external records, social determinants of health, management of medications, response to those treatments, and discussions with other  providers.    Differential Diagnosis and Plan: Initial concern for opiate overdose, unresponsive to total of 6 mg of Narcan between EMS and the ER.  Secondary concern at this point for acute CVA, metabolic abnormality, intracranial hemorrhage, sepsis, renal failure, among others.  Will plan for stat stroke neurology consult, neuroimaging, chest x-ray, EKG, supportive care, and reevaluate with results.  Patient intubated on arrival, plan for hospitalization to the ICU.    Additional sources:  - Discussed/ obtained information from independent historians: EMS reporting 95% room air oxygenation on their arrival to the scene     - (Social Determinants of Health): None     - Shared decision making:     ED Course as of 11/13/24 0458   Wed Nov 13, 2024   0141 Discussed with Dr. Omer, Stroke Neurology, discussed that patient's clinical course and findings today, treatment modalities, and we will activate Team D protocol.  [DC]   0142 Last known well ~midnight per EMS from caretaker report at home [DC]   0142 WBC(!): 11.60 [DC]   0142 Hemoglobin(!): 10.7 [DC]   0142 Platelets(!): 479 [DC]   0147 Patient with extensor posturing, no significant response to 4mg IV Narcan here, received 2mg IN narcan prior to arrival.  [DC]   0159 PTT: 31.2 [DC]   0159 XR Chest 1 View  Per my independent interpretation of the chest x-ray, no evidence of pneumothorax, ET tube appears to be above the [place] though difficult to tell given the hardware [DC]   0211 Glucose(!): 109 [DC]   0211 BUN(!): 27 [DC]   0211 Creatinine: 0.88 [DC]   0211 Sodium: 136 [DC]   0211 Potassium: 3.6 [DC]   0211 ALT (SGPT): 12 [DC]   0211 AST (SGOT): 15 [DC]   0211 Alkaline Phosphatase: 84 [DC]   0211 Total Bilirubin: <0.2 [DC]   0211 proBNP: <36.0 [DC]   0211 Magnesium: 2.1 [DC]   0212 HS Troponin T: 7 [DC]   0212 CT Angiogram Head w AI Analysis of LVO  Per my independent interpretation of the CT head without contrast, no overtly evident intracranial hemorrhage  [DC]   0222 proBNP: <36.0 [DC]   0227 Discussed CTA/CTP findings with Dr. Omer, no indication of an LVO at this time, no sufficient evidence to warrant TNK.  [DC]   0307 pH, Arterial(!): 7.454 [DC]   0307 pCO2, Arterial: 38.8 [DC]   0319 Discussed with EVELINE Lai, ICU, discussed patient's clinical course and findings today, treatment modalities, stroke neurology consult and recommendations, and need for hospitalization. [DC]   0423 Family at the bedside have been fully updated.  I have stronger suspicion for possible pain medication induced syndrome at this time, patient has never had a full overdose that they are aware of but had complained of some increased pain earlier today after PT and she is on several pain medications per family. [DC]   0458 Nitrite, UA: Negative [DC]   0458 Leukocytes, UA: Negative [DC]   0458 Ketones, UA: Negative [DC]      ED Course User Index  [DC] Sohan Cross MD       Hospitalization Considered?: yes    Orders Placed During This Visit:  Orders Placed This Encounter   Procedures    Intubation    Critical Care    XR Chest 1 View    CT Angiogram Head w AI Analysis of LVO    CT Angiogram Neck    CT CEREBRAL PERFUSION WITH & WITHOUT CONTRAST    XR Chest 1 View    Comprehensive Metabolic Panel    Protime-INR    aPTT    Urinalysis With Microscopic If Indicated (No Culture) - Urine, Catheter    Blood Gas, Arterial -    BNP    High Sensitivity Troponin T    Magnesium    CBC Auto Differential    [place] Draw    High Sensitivity Troponin T 2Hr    Ethanol    Urine Drug Screen - Urine, Clean Catch    TSH Rfx On Abnormal To Free T4    Lactic Acid, Plasma    T4, Free    Basic Metabolic Panel    Blood Gas, Arterial -    CBC Auto Differential    NPO Diet NPO Type: Strict NPO    Initiate Department's Acute Stroke Process (Team D, Code 19, etc.)    Perform NIH Stroke Scale    Measure Actual Weight    Notify Provider    Notify Provider for SBP Greater Than 140 for Hemorrhagic Stroke Patient     Head of Bed 30 Degrees or Less    Undress and Gown    Vital Signs    Neuro Checks    No Hypotonic Fluids    Nursing Dysphagia Screening (Complete Prior to Giving anything PO)    RN to Place Order SLP Consult (IF swallow screen failed) - Eval & Treat Choosing Reason of RN Dysphagia Screen Failed    Vital Signs Every Hour and Per Hospital Policy Based on Patient Condition    Telemetry - Place Orders & Notify Provider of Results When Patient Experiences Acute Chest Pain, Dysrhythmia or Respiratory Distress    Continuous Pulse Oximetry    Height & Weight    Daily Weights    Intake & Output    Oral Care - Patient Not on NPPV & Not Intubated    Use Mobility Guidelines for Advancement of Activity    Daily CHG Bath While in Critical Care    Elevate HOB    Oral Care & Teeth Brushing - Intubated Patient    Subglottic Suctioning Must Be Done Every 6 Hours    Spontaneous Awakening Trial    Target Arousal Level RASS 0 to -2    RN May Place Order For ABG As Needed for Respiratory Distress    If Patient has BG Less Than 80 & is Symptomatic But Not on IV Insulin Protocol - Use Adult Hypoglycemia Treatment Orders    Maintain IV Access    ICU / CCU - Place Order Consult Intensivist For Critical Care Management (If Patient Not Admitted to Cardiology for Primary Cardiology Condition)    ICU / CCU - Notify All Physicians When Patient is Transferred    Code Status and Medical Interventions: CPR (Attempt to Resuscitate); Full Support    Inpatient Neurology Consult Stroke    Inpatient Neurology Consult Stroke    Pulmonology (on-call MD unless specified)    Oxygen Therapy- Nasal Cannula; Titrate 1-6 LPM Per SpO2; 90 - 95%    Ventilator - Vent Mode: AC/VC; Rate: Other; Rate: 18; FiO2: 100%; PEEP: 5; Tidal Volume: mL/kg PBW; mL/kg: Other; mL/k    Spontaneous Breathing Trial    Weaning Parameters    POC Glucose Once    POC Glucose Q6H    ECG 12 Lead Altered Mental Status    ECG 12 Lead Stroke Evaluation    Type & Screen    Insert  Large-Bore Peripheral IV - RIGHT AC Preferred    Insert Peripheral IV    Inpatient Admission    CBC & Differential    Green Top (Gel)    Lavender Top    Gold Top - SST    Light Blue Top    CBC & Differential       Additional orders considered but not placed:      Independent interpretation of labs, radiology studies, and discussions with consultants: See ED Course        AS OF 04:58 EST VITALS:    BP - 148/88  HR - 85  TEMP - 96.9 °F (36.1 °C) (Tympanic)  02 SATS - 90%          DIAGNOSIS  Final diagnoses:   Acute encephalopathy   Acute respiratory failure, unspecified whether with hypoxia or hypercapnia         DISPOSITION  ED Disposition       ED Disposition   Decision to Admit    Condition   --    Comment   Level of Care: Critical Care [6]   Diagnosis: Acute respiratory failure, unspecified whether with hypoxia or hypercapnia [[id number]]   Admitting Physician: PACO MOREIRA [[id number]]   Attending Physician: PACO MOREIRA [[id number]]   Certification: I Certify That Inpatient Hospital Services Are Medically Necessary For Greater Than 2 Midnights                  Please note that portions of this document were completed with a voice recognition program.    Note Disclaimer: At [facility], we believe that sharing information builds trust and better relationships. You are receiving this note because you recently visited [facility]. It is possible you will see health information before a provider has talked with you about it. This kind of information can be easy to misunderstand. To help you fully understand what it means for your health, we urge you to discuss this note with your provider.                       Sohan Cross MD  11/13/24 0458

## 2024-11-13 NOTE — PROGRESS NOTES
64-year-old female who presents with altered mental status.  Curre she was ntly found unresponsive at home.  Did not respond to Narcan.  Patient is nonverbal with only some responsiveness to pain on arrival in the ED.  She was intubated for airway protection.  On arrival in the ICU she is on the ventilator and on propofol at 10.  When vigorously stimulated she tries to sit up but does not appear oriented at all.  Her medical history includes chronic pain for which she is on narcotic medications.  She has hypogammaglobulinemia with history of frequent pneumonias, now on replacement IVIG through hematology oncology.  Also hypothyroidism and rheumatoid arthritis.  Currently propofol increased to 50 due to straight cath being performed.  Vital signs reviewed and hemodynamics look normal.  Adult female laying in bed on mechanical ventilation.  Currently sedated deeply with propofol at 50.  Pupils are react to.  Lungs reveal bilateral air entry clear to auscultation.  Heart examination S1-S2 present rhythm regular no murmurs.  Extremities no edema.  Abdomen soft and nontender.  Currently unable to do neuroexam.  Labs and imaging data reviewed.  Urine drug screen is pending.  Anemia noted with hemoglobin 10.7.  CT head and neck reported as possible bilateral carotid biopsy and possible distal posterior right M2 filling defect.    Altered mental status  Possible changes on CT head and neck  Invasive mechanical ventilation for airway protection  Chronic narcotic pain medications  Hypogammaglobulinemia on replacement IVIG  Surgical hypothyroidism on replacement Synthroid  Rheumatoid arthritis on Cimzia and possibly chronic prednisone  Anemia    Etiology of altered mental status unclear.  She is on chronic narcotic pain medications.  Urine drug screen is pending.  There is no preintubation ABG.  Previous ABGs do not reveal hypercapnia.  Patient is reportedly a non-smoker.  There are possible changes on CT head and neck.   Possible carotid webs bilaterally and possible distal right M2 filling defect.  Await further neurology input.    Intubated for airway protection.  Await improvement in neurostatus prior to considering weaning and extubation.  Ventilator settings adjusted at bedside.  Repeat ABG will be obtained.    Hypogammaglobulinemia on replacement IVIG and previously frequent pneumonias.  Also immunocompromise host due to rheumatoid arthritis and medications.  Currently no evidence of acute infection.    Rheumatoid arthritis on Cimzia and possibly on chronic prednisone.  Blood pressure is currently adequate.  I will check a cortisol level.    Anemia noted on current labs.  Appears stable compared to previous hemoglobin.  Evidence for iron deficiency on recent labs.  She follows with hematology oncology and may need consideration of iron replacement.    I spent 35 mins critical care time in care of this patient outside of any procedures and not overlapping with any other provider.     Electronically signed by Edmundo Garvin MD, 11/13/24, 6:48 AM EST.

## 2024-11-13 NOTE — CONSULTS
"Neurology Consult Note    Consult Date: 11/13/2024    Referring MD: Sohan Cross MD    Reason for Consult I have been asked to see the patient in neurological consultation to render advice and opinion regarding acute encephalopathy    Paola Reid is a 64 y.o. female with history of anxiety, hypothyroidism, rheumatoid arthritis who was brought into the hospital yesterday for lethargy that started late in the evening.  On presentation to the emergency department she was unresponsive and was intubated.  CTA was done which showed a possible M2 stenosis but no perfusion deficit.  She was admitted to the ICU where she has remained minimally responsive.  Off sedation this morning she does follow some commands    No past medical history on file.    Exam  /67   Pulse 90   Temp 97.3 °F (36.3 °C) (Axillary)   Resp 12   Ht 167.6 cm (66\")   Wt 70.5 kg (155 lb 6.8 oz)   SpO2 100%   BMI 25.09 kg/m²   Gen: NAD, vitals reviewed, propofol paused  MS: Arouses to voice, follows multiple commands for me  CN: Pupils equal, conjugate gaze  Motor: Moving all 4 extremities to command    DATA:    Lab Results   Component Value Date    GLUCOSE 234 (H) 11/13/2024    CALCIUM 8.1 (L) 11/13/2024     11/13/2024    K 3.0 (L) 11/13/2024    CO2 22.0 11/13/2024     11/13/2024    BUN 22 11/13/2024    CREATININE 0.71 11/13/2024    BCR 31.0 (H) 11/13/2024    ANIONGAP 14.0 11/13/2024     Lab Results   Component Value Date    WBC 9.53 11/13/2024    HGB 11.2 (L) 11/13/2024    HCT 33.5 (L) 11/13/2024    MCV 91.0 11/13/2024     11/13/2024       Lab review: CBC, BMP reviewed    Imaging review: I personally reviewed her CTA head and neck and CT perfusion described above.  Discussed with the reading radiologist.    Diagnoses:  Acute encephalopathy    Comment: Acute encephalopathy, suspected toxic etiology.  Recommend holding sedating home medications and observing.    PLAN:  Okay to attempt SBT today from neurology " standpoint  MRI brain when able  Routine EEG    Discussed with nursing staff and family at the bedside.

## 2024-11-13 NOTE — ED NOTES
Nursing report ED to floor  Paola Waldropifield  64 y.o.  female    HPI :  HPI  Stated Reason for Visit: suspected OD  History Obtained From: EMS    Chief Complaint  Chief Complaint   Patient presents with    Loss of Consciousness     Possible OD       Admitting doctor:   Edmundo Garvin MD    Admitting diagnosis:   The primary encounter diagnosis was Acute encephalopathy. A diagnosis of Acute respiratory failure, unspecified whether with hypoxia or hypercapnia was also pertinent to this visit.    Code status:   Current Code Status       Date Active Code Status Order ID Comments User Context       Not on file            Allergies:   Patient has no allergy information on record.    Isolation:   No active isolations    Intake and Output  No intake or output data in the 24 hours ending 11/13/24 0343    Weight:       11/13/24  0135   Weight: 73.1 kg (161 lb 2.5 oz)       Most recent vitals:   Vitals:    11/13/24 0255 11/13/24 0302 11/13/24 0316 11/13/24 0331   BP:   149/90 141/90   Pulse: 89 86 86 84   Resp:  18     Temp:       TempSrc:       SpO2: 100% 100% 100% 90%   Weight:           Active LDAs/IV Access:   Lines, Drains & Airways       Active LDAs       Name Placement date Placement time Site Days    Peripheral IV 11/13/24 0122 Anterior;Left Hand 11/13/24  0122  Hand  thumb  less than 1    Peripheral IV 11/13/24 0127 Anterior;Right Hand 11/13/24 0127  Hand  thumb  less than 1    Peripheral IV 11/13/24 0154 Right Antecubital 11/13/24  0154  Antecubital  less than 1    ETT  11/13/24  --  -- less than 1                    Labs (abnormal labs have a star):   Labs Reviewed   COMPREHENSIVE METABOLIC PANEL - Abnormal; Notable for the following components:       Result Value    Glucose 109 (*)     BUN 27 (*)     BUN/Creatinine Ratio 30.7 (*)     All other components within normal limits    Narrative:     GFR Normal >60  Chronic Kidney Disease <60  Kidney Failure <15     PROTIME-INR - Abnormal; Notable for the following  components:    Protime 14.7 (*)     INR 1.13 (*)     All other components within normal limits   BLOOD GAS, ARTERIAL - Abnormal; Notable for the following components:    pH, Arterial 7.454 (*)     pO2, Arterial 504.1 (*)     Base Excess, Arterial 3.1 (*)     O2 Saturation, Arterial 100.0 (*)     CO2 Content 28.4 (*)     PO2/FIO2 504 (*)     All other components within normal limits   CBC WITH AUTO DIFFERENTIAL - Abnormal; Notable for the following components:    WBC 11.60 (*)     RBC 3.53 (*)     Hemoglobin 10.7 (*)     Hematocrit 33.3 (*)     Platelets 479 (*)     Lymphocytes, Absolute 3.12 (*)     Eosinophils, Absolute 0.57 (*)     All other components within normal limits   TSH RFX ON ABNORMAL TO FREE T4 - Abnormal; Notable for the following components:    TSH 4.510 (*)     All other components within normal limits   APTT - Normal   URINALYSIS W/ MICROSCOPIC IF INDICATED (NO CULTURE) - Normal    Narrative:     Urine microscopic not indicated.   BNP (IN-HOUSE) - Normal    Narrative:     This assay is used as an aid in the diagnosis of individuals suspected of having heart failure. It can be used as an aid in the diagnosis of acute decompensated heart failure (ADHF) in patients presenting with signs and symptoms of ADHF to the emergency department (ED). In addition, NT-proBNP of <300 pg/mL indicates ADHF is not likely.    Age Range Result Interpretation  NT-proBNP Concentration (pg/mL:      <50             Positive            >450                   Gray                 300-450                    Negative             <300    50-75           Positive            >900                  Gray                300-900                  Negative            <300      >75             Positive            >1800                  Gray                300-1800                  Negative            <300   TROPONIN - Normal    Narrative:     High Sensitive Troponin T Reference Range:  <14.0 ng/L- Negative Female for AMI  <22.0 ng/L-  Negative Male for AMI  >=14 - Abnormal Female indicating possible myocardial injury.  >=22 - Abnormal Male indicating possible myocardial injury.   Clinicians would have to utilize clinical acumen, EKG, Troponin, and serial changes to determine if it is an Acute Myocardial Infarction or myocardial injury due to an underlying chronic condition.        MAGNESIUM - Normal   RAINBOW DRAW    Narrative:     The following orders were created for panel order [place] Draw.  Procedure                               Abnormality         Status                     ---------                               -----------         ------                     Green Top (Gel)[[id number]]                                                             Lavender Top[[id number]]                                                                Gold Top - SST[[id number]]                                                              Light Blue Top[[id number]]                                                                Please view results for these tests on the individual orders.   HIGH SENSITIVITIY TROPONIN T 2HR   ETHANOL   URINE DRUG SCREEN   LACTIC ACID, PLASMA   T4, FREE   POCT GLUCOSE FINGERSTICK   TYPE AND SCREEN   CBC AND DIFFERENTIAL    Narrative:     The following orders were created for panel order CBC & Differential.  Procedure                               Abnormality         Status                     ---------                               -----------         ------                     CBC Auto Differential[[id number]]        Abnormal            Final result                 Please view results for these tests on the individual orders.   GREEN TOP   LAVENDER TOP   GOLD TOP - SST   LIGHT BLUE TOP       EKG:   ECG 12 Lead Altered Mental Status   Preliminary Result   HEART RATE=89  bpm   RR Iwecyply=588  ms   NV Ftorlenv=989  ms   P Horizontal Axis=1  deg   P Front Axis=17  deg   QRSD Interval=91  ms   QT Twkbtnjx=752  ms   ASqV=365  ms   QRS  Axis=-6  deg   T Wave Axis=59  deg   - OTHERWISE NORMAL ECG -   Sinus rhythm   Low voltage, precordial leads   Baseline wander in lead(s) III,aVF   Date and Time of Study:2024-11-13 02:29:15      ECG 12 Lead Stroke Evaluation    (Results Pending)       Meds given in ED:   Medications   sodium chloride 0.9 % flush 10 mL (has no administration in time range)   propofol (DIPRIVAN) infusion 10 mg/mL 100 mL (5 mcg/kg/min × 73.1 kg Intravenous New Bag 11/13/24 0316)   Naloxone HCl (NARCAN) injection 2 mg (2 mg Intravenous Given 11/13/24 0128)   etomidate (AMIDATE) injection 20 mg (20 mg Intravenous Given 11/13/24 0134)   rocuronium (ZEMURON) injection 70 mg (70 mg Intravenous Given 11/13/24 0137)   fentaNYL citrate (PF) (SUBLIMAZE) injection 100 mcg (100 mcg Intravenous Given 11/13/24 0156)   iopamidol (ISOVUE-370) 76 % injection 150 mL (150 mL Intravenous Given 11/13/24 0221)   fentaNYL citrate (PF) (SUBLIMAZE) injection 100 mcg (100 mcg Intravenous Given 11/13/24 0312)       Imaging results:  XR Chest 1 View    Result Date: 11/13/2024  Endotracheal tube terminates in satisfactory position.  This report was finalized on 11/13/2024 2:08 AM by Dr. Tika Zavala M.D on Workstation: [id number]       Ambulatory status:   - q2 turn    Social issues:   Social History     Socioeconomic History    Marital status: Single       Peripheral Neurovascular  Peripheral Neurovascular (Adult)  Peripheral Neurovascular WDL: WDL    Neuro Cognitive  Neuro Cognitive (Adult)  Cognitive/Neuro/Behavioral WDL: .WDL except  Additional Documentation: NIH Stroke Scale (group)  Sugar Coma Scale  Best Eye Response: 1-->(E1) none  Best Motor Response: 1-->(M1) none  Best Verbal Response: 1-->(V1) none  Sugar Coma Scale Score: 3  NIH Stroke Scale  Interval: baseline  1a. Level of Consciousness: 3-->Responds only with reflex motor or autonomic effects or totally unresponsive, flaccid, and areflexic  1b. LOC Questions: 2-->Answers neither  question correctly  1c. LOC Commands: 2-->Performs neither task correctly  2. Best Gaze: 2-->Forced deviation, or total gaze paresis not overcome by the oculocephalic maneuver  3. Visual: 3-->Bilateral hemianopia (blind including cortical blindness)  4. Facial Palsy: 3-->Complete paralysis of one or both sides (absence of facial movement in the upper and lower face)  5a. Motor Arm, Left: 4-->No movement  5b. Motor Arm, Right: 4-->No movement  6a. Motor Leg, Left: 4-->No movement  6b. Motor Leg, Right: 4-->No movement  7. Limb Ataxia: 2-->Present in two limbs  8. Sensory: 2-->Severe to total sensory loss, patient is not aware of being touched in the face, arm, and leg  9. Best Language: 3-->Mute, global aphasia, no usable speech or auditory comprehension  10. Dysarthria: (UN) Intubated or other physical barrier  11. Extinction and Inattention (formerly Neglect): 0-->No abnormality  Total (NIH Stroke Scale): 38  Sedation Group  RASS (Fong Agitation-Sedation Scale): 1-->restless    Learning  Learning Assessment  Learning Readiness and Ability: sensory deficit noted, physical limitation noted, cognitive limitation noted    Respiratory  Respiratory  Airway WDL: .WDL except, airway symptoms  Airway Symptoms: unable to maintain airway  Respiratory WDL  Respiratory WDL: .WDL except  Rhythm/Pattern, Respiratory: assisted mechanically  Breath Sounds  All Lung Fields Breath Sounds: diminished    Abdominal Pain       Pain Assessments  Pain (Adult)  Preferred Pain Scale: rFLACC (Revised Face, Legs, Activity, Crying, and Consolability Scale)  rFLACC Pain Rating: Face: 0-->no particular expression or smile  rFLACC Pain Rating: Legs: 0-->normal position or relaxed  rFLACC Pain Rating: Activity: 0-->lying quietly, normal position, moves easily  rFLACC Pain Rating: Cry: 0-->no cry (awake or asleep)  rFLACC Pain Rating: Consolability: 0-->content, relaxed  rFLACC Score:: 0    NIH Stroke Scale  NIH Stroke Scale  Interval:  baseline  1a. Level of Consciousness: 3-->Responds only with reflex motor or autonomic effects or totally unresponsive, flaccid, and areflexic  1b. LOC Questions: 2-->Answers neither question correctly  1c. LOC Commands: 2-->Performs neither task correctly  2. Best Gaze: 2-->Forced deviation, or total gaze paresis not overcome by the oculocephalic maneuver  3. Visual: 3-->Bilateral hemianopia (blind including cortical blindness)  4. Facial Palsy: 3-->Complete paralysis of one or both sides (absence of facial movement in the upper and lower face)  5a. Motor Arm, Left: 4-->No movement  5b. Motor Arm, Right: 4-->No movement  6a. Motor Leg, Left: 4-->No movement  6b. Motor Leg, Right: 4-->No movement  7. Limb Ataxia: 2-->Present in two limbs  8. Sensory: 2-->Severe to total sensory loss, patient is not aware of being touched in the face, arm, and leg  9. Best Language: 3-->Mute, global aphasia, no usable speech or auditory comprehension  10. Dysarthria: (UN) Intubated or other physical barrier  11. Extinction and Inattention (formerly Neglect): 0-->No abnormality  Total (NIH Stroke Scale): 38    Kiana Agarwal RN  11/13/24 03:43 EST

## 2024-11-13 NOTE — HOME HEALTH
Plan for next visit: ADL, IADL, DME, HEP, home safety, falls prevention, education, bathroom and kitchen mobility, activity tolerance, standing tolerance and balance

## 2024-11-14 ENCOUNTER — APPOINTMENT (OUTPATIENT)
Dept: MRI IMAGING | Facility: HOSPITAL | Age: 64
End: 2024-11-14
Payer: MEDICARE

## 2024-11-14 ENCOUNTER — TRANSCRIBE ORDERS (OUTPATIENT)
Dept: HOME HEALTH SERVICES | Facility: HOME HEALTHCARE | Age: 64
End: 2024-11-14
Payer: MEDICARE

## 2024-11-14 ENCOUNTER — APPOINTMENT (OUTPATIENT)
Dept: GENERAL RADIOLOGY | Facility: HOSPITAL | Age: 64
End: 2024-11-14
Payer: MEDICARE

## 2024-11-14 ENCOUNTER — HOME CARE VISIT (OUTPATIENT)
Dept: HOME HEALTH SERVICES | Facility: HOME HEALTHCARE | Age: 64
End: 2024-11-14
Payer: MEDICARE

## 2024-11-14 ENCOUNTER — DOCUMENTATION (OUTPATIENT)
Dept: HOME HEALTH SERVICES | Facility: HOME HEALTHCARE | Age: 64
End: 2024-11-14
Payer: MEDICARE

## 2024-11-14 VITALS
BODY MASS INDEX: 24.98 KG/M2 | TEMPERATURE: 98.1 F | WEIGHT: 155.42 LBS | SYSTOLIC BLOOD PRESSURE: 123 MMHG | HEIGHT: 66 IN | RESPIRATION RATE: 12 BRPM | DIASTOLIC BLOOD PRESSURE: 77 MMHG | HEART RATE: 104 BPM | OXYGEN SATURATION: 100 %

## 2024-11-14 VITALS
HEART RATE: 76 BPM | TEMPERATURE: 97.8 F | RESPIRATION RATE: 17 BRPM | SYSTOLIC BLOOD PRESSURE: 128 MMHG | DIASTOLIC BLOOD PRESSURE: 76 MMHG | OXYGEN SATURATION: 96 %

## 2024-11-14 DIAGNOSIS — Z79.899 POLYPHARMACY: Primary | ICD-10-CM

## 2024-11-14 LAB
ANION GAP SERPL CALCULATED.3IONS-SCNC: 14 MMOL/L (ref 5–15)
BASOPHILS # BLD AUTO: 0.11 10*3/MM3 (ref 0–0.2)
BASOPHILS NFR BLD AUTO: 1.3 % (ref 0–1.5)
BUN SERPL-MCNC: 14 MG/DL (ref 8–23)
BUN/CREAT SERPL: 21.9 (ref 7–25)
CALCIUM SPEC-SCNC: 9 MG/DL (ref 8.6–10.5)
CHLORIDE SERPL-SCNC: 92 MMOL/L (ref 98–107)
CO2 SERPL-SCNC: 27 MMOL/L (ref 22–29)
CREAT SERPL-MCNC: 0.64 MG/DL (ref 0.57–1)
DEPRECATED RDW RBC AUTO: 42.5 FL (ref 37–54)
EGFRCR SERPLBLD CKD-EPI 2021: 97.6 ML/MIN/1.73
EOSINOPHIL # BLD AUTO: 0.46 10*3/MM3 (ref 0–0.4)
EOSINOPHIL NFR BLD AUTO: 5.5 % (ref 0.3–6.2)
ERYTHROCYTE [DISTWIDTH] IN BLOOD BY AUTOMATED COUNT: 13 % (ref 12.3–15.4)
GLUCOSE SERPL-MCNC: 88 MG/DL (ref 65–99)
HCT VFR BLD AUTO: 34.1 % (ref 34–46.6)
HGB BLD-MCNC: 11.5 G/DL (ref 12–15.9)
IMM GRANULOCYTES # BLD AUTO: 0.01 10*3/MM3 (ref 0–0.05)
IMM GRANULOCYTES NFR BLD AUTO: 0.1 % (ref 0–0.5)
LYMPHOCYTES # BLD AUTO: 2.06 10*3/MM3 (ref 0.7–3.1)
LYMPHOCYTES NFR BLD AUTO: 24.8 % (ref 19.6–45.3)
MCH RBC QN AUTO: 30.6 PG (ref 26.6–33)
MCHC RBC AUTO-ENTMCNC: 33.7 G/DL (ref 31.5–35.7)
MCV RBC AUTO: 90.7 FL (ref 79–97)
MONOCYTES # BLD AUTO: 0.53 10*3/MM3 (ref 0.1–0.9)
MONOCYTES NFR BLD AUTO: 6.4 % (ref 5–12)
NEUTROPHILS NFR BLD AUTO: 5.13 10*3/MM3 (ref 1.7–7)
NEUTROPHILS NFR BLD AUTO: 61.9 % (ref 42.7–76)
NRBC BLD AUTO-RTO: 0 /100 WBC (ref 0–0.2)
PLATELET # BLD AUTO: 426 10*3/MM3 (ref 140–450)
PMV BLD AUTO: 9.2 FL (ref 6–12)
POTASSIUM SERPL-SCNC: 3.3 MMOL/L (ref 3.5–5.2)
RBC # BLD AUTO: 3.76 10*6/MM3 (ref 3.77–5.28)
SODIUM SERPL-SCNC: 133 MMOL/L (ref 136–145)
WBC NRBC COR # BLD AUTO: 8.3 10*3/MM3 (ref 3.4–10.8)

## 2024-11-14 PROCEDURE — 25510000002 GADOBENATE DIMEGLUMINE 529 MG/ML SOLUTION: Performed by: INTERNAL MEDICINE

## 2024-11-14 PROCEDURE — 36415 COLL VENOUS BLD VENIPUNCTURE: CPT

## 2024-11-14 PROCEDURE — 99232 SBSQ HOSP IP/OBS MODERATE 35: CPT | Performed by: NURSE PRACTITIONER

## 2024-11-14 PROCEDURE — 70553 MRI BRAIN STEM W/O & W/DYE: CPT

## 2024-11-14 PROCEDURE — 25010000002 KETOROLAC TROMETHAMINE PER 15 MG

## 2024-11-14 PROCEDURE — A9577 INJ MULTIHANCE: HCPCS | Performed by: INTERNAL MEDICINE

## 2024-11-14 PROCEDURE — 80048 BASIC METABOLIC PNL TOTAL CA: CPT

## 2024-11-14 PROCEDURE — 85025 COMPLETE CBC W/AUTO DIFF WBC: CPT

## 2024-11-14 RX ORDER — TRAMADOL HYDROCHLORIDE 50 MG/1
50 TABLET ORAL EVERY 6 HOURS PRN
Status: DISCONTINUED | OUTPATIENT
Start: 2024-11-14 | End: 2024-11-14 | Stop reason: HOSPADM

## 2024-11-14 RX ADMIN — Medication 10 ML: at 09:55

## 2024-11-14 RX ADMIN — GADOBENATE DIMEGLUMINE 14 ML: 529 INJECTION, SOLUTION INTRAVENOUS at 05:54

## 2024-11-14 RX ADMIN — TRAMADOL HYDROCHLORIDE 50 MG: 50 TABLET, FILM COATED ORAL at 12:02

## 2024-11-14 RX ADMIN — TRAMADOL HYDROCHLORIDE 50 MG: 50 TABLET, FILM COATED ORAL at 05:09

## 2024-11-14 RX ADMIN — PANTOPRAZOLE SODIUM 40 MG: 40 INJECTION, POWDER, FOR SOLUTION INTRAVENOUS at 09:55

## 2024-11-14 RX ADMIN — 0.12% CHLORHEXIDINE GLUCONATE 15 ML: 1.2 RINSE ORAL at 09:55

## 2024-11-14 RX ADMIN — KETOROLAC TROMETHAMINE 15 MG: 15 INJECTION, SOLUTION INTRAMUSCULAR; INTRAVENOUS at 01:16

## 2024-11-14 RX ADMIN — SENNOSIDES AND DOCUSATE SODIUM 2 TABLET: 50; 8.6 TABLET ORAL at 09:55

## 2024-11-14 RX ADMIN — MUPIROCIN 1 APPLICATION: 20 OINTMENT TOPICAL at 09:55

## 2024-11-14 NOTE — DISCHARGE SUMMARY
Patient Identification:  Name: Paola Reid  Age: 64 y.o.  Sex: female  :  1960  MRN: [id number]  Primary Care Physician: Akil Mcnally DO    Admit date: 2024  Discharge date and time: 2024  Discharged Condition: good    Discharge Diagnoses:    Respiratory depression from polypharmacy  Toxic encephalopathy  Requiring mechanical ventilation for airway protection  Polypharmacy  Hypokalemia  Hypertension  Chronic opioid use  Hypogammaglobulinemia on IVIG  Hypothyroidism  Rheumatoid arthritis  Chronic glucocorticoid therapy  Anemia    Hospital Course: Paola Reid presented to [facility] with altered mental status, toxic encephalopathy due to polypharmacy.  This was causing respiratory depression and required intubation for airway protection.  She was successfully extubated the next day.  All her labs are unremarkable except for mildly decreased potassium but she is taking replacement potassium.  I have discontinued her hydrochlorothiazide.  I had a julius conversation with the patient and told her that her episode occurred due to polypharmacy and she needs to decrease the dosages of several of these medications, and perhaps stop a few of these.  She says she is going to speak to Dr. Akil Mcnally, her PCP to streamline these medications.  I discussed with neurology team who agrees this was polypharmacy.  The patient has maximally benefited from acute inpatient care and is ready for discharge home now with follow-up with her PCP within 7 days.  She did not have respiratory failure due to lung disease, it was hypoxemia due to respiratory depression from polypharmacy.  No evidence of lung infection.      Consults:   IP CONSULT TO NEUROLOGY  IP CONSULT TO NEUROLOGY  IP CONSULT TO PULMONOLOGY    Significant Diagnostic Studies:   Imaging Results (Most Recent)       Procedure Component Value Units Date/Time    MRI Brain With & Without Contrast [[id number]] Collected:  11/14/24 0729     Updated: 11/14/24 0738    Narrative:      MRI of the brain with and without contrast on 11/14/2024     Clinical history: This is a 64-year-old female patient with acute  altered mental status and encephalopathy.     TECHNIQUE: Axial T1, FLAIR, fat-suppressed T2, axial diffusion and  gradient echo T2, sagittal T1 and postcontrast axial fat-suppressed T1  and coronal T1-weighted images were obtained of the entire head     There are no prior MRIs of the brain for comparison. This is correlated  to yesterday contrast-enhanced CT angiogram of the head neck and CT  perfusion study the brain on 11/13/2024 at 2:00 a.m.     FINDINGS: There are multiple patchy and nodular foci of T2 and FLAIR  hyperintensity in the periventricular and subcortical white matter of  the cerebral hemispheres most consistent with mild-moderate small vessel  disease. The remainder of the brain parenchyma is normal in signal  intensity. Specifically, no diffusion weighted abnormality is identified  with no acute infarct identified. On the gradient echo T2 weighted  images, no acute or old blood breakdown products are seen  intracranially. The ventricles are normal in size. I see no focal mass  effect. There is no midline shift. No extra-axial fluid collections are  identified. No abnormal areas of enhancement are seen in the head. The  paranasal sinuses are clear. There is a tiny amount of fluid in the  posterior right mastoid air cells; otherwise, the mastoid air cells and  middle ear cavities are clear. There are lens implants in place in the  globes bilaterally from previous bilateral cataract surgery. Otherwise,  the orbits are unremarkable. Good flow voids are demonstrated within the  cerebral vessels and in the dural venous sinuses. On the sagittal  T1-weighted images there is evidence of previous anterior cervical  discectomy and fusion procedure with anterior plate and screw fixation  at the C3-4 cervical level. The  calvarium and skull base demonstrate  normal marrow signal intensity.       Impression:      1. No acute intracranial abnormality is identified  2. There is mild-moderate small vessel disease in the cerebral white  matter. There is a tiny mount of fluid in the posterior right mastoid  air cells. There are lens implants in place in the globes bilaterally  from previous bilateral cataract surgery. There is been a previous  anterior cervical discectomy and fusion procedure at the C3-4 cervical  level with anterior plate and screw fixation. The remainder of the MRI  of the brain is normal. Specifically, no acute infarct is identified.     This report was finalized on 11/14/2024 7:35 AM by Dr. J Luis Sandhu M.D  on Workstation: [id number]       CT Angiogram Head w AI Analysis of LVO [[id number]] Collected: 11/13/24 0444     Updated: 11/13/24 0534    Narrative:      NONCONTRAST CRANIAL CT SCAN, CT ANGIOGRAM NECK, CT ANGIOGRAM HEAD,  CRANIAL CT PERFUSION.     HISTORY: Found down.     COMPARISON: None..     TECHNIQUE:  Radiation dose reduction techniques were utilized, including  automated exposure control and exposure modulation based on body size.   Initially, a routine noncontrast cranial CT performed from the skull  base through the vertex region. After review, thin-section contrast  enhanced CT angiogram images obtained from the aortic arch through the  calvarial vertex utilizing angiographic technique. Multi projection 3-D  MIP reformatted images were supplemented and reviewed. Subsequently, CT  perfusion imaging performed with ischemia view software.     FINDINGS CRANIAL CT: No acute hemorrhage or midline shift is  demonstrated.. There is atrophy. There is periventricular and deep white  matter microangiopathic change. Postcontrast imaging does not  demonstrate any abnormal enhancement or venous occlusion. Bone window  images demonstrate some mucosal thickening within the ethmoid sinuses.     Study was placed on  line at 2:05 a.m. Preliminary interpretation  telephoned to extension [phone] during interpretation at 2:11 a.m.           CERVICAL CAROTID CT ANGIOGRAM:     FINDINGS: There is normal arch anatomy. The common carotid arteries are  widely patent. There is some plaque noted at the left carotid  bifurcation. Images do suggest indentations along the posterior walls of  the proximal internal carotid arteries bilaterally, although more  significant on the left. Appearance raises the possibility of carotid  webs.. The internal carotid arteries are widely patent. The vertebral  arteries are patent throughout their courses. The left is dominant.     Images through the lung apices demonstrates nonspecific groundglass  opacities. Endotracheal tube terminates in satisfactory position. The  patient has a right internal jugular vein Mediport. There is some  intravenous air noted. There are changes of prior anterior cervical  spinal fusion at C3-C4 and C7-T1. There is also posterior spinal fusion  hardware noted within the thoracic spine.        NASCET criteria utilized in stenosis measurements. stenosis mild, 0-49%.        CRANIAL CTA ANGIOGRAM:     FINDINGS: The intracranial vertebral arteries are patent. The left is  dominant. Basilar artery is patent. There is a fetal origin of the right  posterior cerebral artery. There is a posterior communicating artery on  the left. Posterior cerebral arteries are patent bilaterally.  Intracranial internal carotid arteries are patent. There is an anterior  communicating artery. The anterior cerebral and middle cerebral arteries  are patent bilaterally. Reformatted images suggest a possible filling  defect within the distal right P2 branch. However, I'm unable to  definitively find on the source images. It may be artifactual.              CT PERFUSION:     FINDINGS: No areas of cerebral blood flow less than 30% or Tmax greater  than 6 seconds are identified..     Study was placed on line at  2:16 a.m. Preliminary interpretation  telephoned to extension [phone] during interpretation at 2:37 a.m.     AI analysis of LVO was utilized.     Radiation dose reduction techniques were utilized, including automated  exposure control and exposure modulation based on body size.          Impression:      1. No acute intracranial findings.  2. Images do suggest indentations involving the posterior walls of the  proximal internal carotid arteries bilaterally. This is more significant  on the left. Appearance raises the possibility of bilateral carotid  webs.  3. Reformatted images suggest a possible filling defect within a distal  right M2 branch. However, I am unable to convincingly find this on the  source images.     Final report was called to Dr. Omer at 5:30 a.m.        This report was finalized on 11/13/2024 5:31 AM by Dr. Tika Zavala M.D on Workstation: [id number]       CT Angiogram Neck [[id number]] Collected: 11/13/24 0444     Updated: 11/13/24 0534    Narrative:      NONCONTRAST CRANIAL CT SCAN, CT ANGIOGRAM NECK, CT ANGIOGRAM HEAD,  CRANIAL CT PERFUSION.     HISTORY: Found down.     COMPARISON: None..     TECHNIQUE:  Radiation dose reduction techniques were utilized, including  automated exposure control and exposure modulation based on body size.   Initially, a routine noncontrast cranial CT performed from the skull  base through the vertex region. After review, thin-section contrast  enhanced CT angiogram images obtained from the aortic arch through the  calvarial vertex utilizing angiographic technique. Multi projection 3-D  MIP reformatted images were supplemented and reviewed. Subsequently, CT  perfusion imaging performed with ischemia view software.     FINDINGS CRANIAL CT: No acute hemorrhage or midline shift is  demonstrated.. There is atrophy. There is periventricular and deep white  matter microangiopathic change. Postcontrast imaging does not  demonstrate any abnormal enhancement or  venous occlusion. Bone window  images demonstrate some mucosal thickening within the ethmoid sinuses.     Study was placed on line at 2:05 a.m. Preliminary interpretation  telephoned to extension [phone] during interpretation at 2:11 a.m.           CERVICAL CAROTID CT ANGIOGRAM:     FINDINGS: There is normal arch anatomy. The common carotid arteries are  widely patent. There is some plaque noted at the left carotid  bifurcation. Images do suggest indentations along the posterior walls of  the proximal internal carotid arteries bilaterally, although more  significant on the left. Appearance raises the possibility of carotid  webs.. The internal carotid arteries are widely patent. The vertebral  arteries are patent throughout their courses. The left is dominant.     Images through the lung apices demonstrates nonspecific groundglass  opacities. Endotracheal tube terminates in satisfactory position. The  patient has a right internal jugular vein Mediport. There is some  intravenous air noted. There are changes of prior anterior cervical  spinal fusion at C3-C4 and C7-T1. There is also posterior spinal fusion  hardware noted within the thoracic spine.        NASCET criteria utilized in stenosis measurements. stenosis mild, 0-49%.        CRANIAL CTA ANGIOGRAM:     FINDINGS: The intracranial vertebral arteries are patent. The left is  dominant. Basilar artery is patent. There is a fetal origin of the right  posterior cerebral artery. There is a posterior communicating artery on  the left. Posterior cerebral arteries are patent bilaterally.  Intracranial internal carotid arteries are patent. There is an anterior  communicating artery. The anterior cerebral and middle cerebral arteries  are patent bilaterally. Reformatted images suggest a possible filling  defect within the distal right P2 branch. However, I'm unable to  definitively find on the source images. It may be artifactual.              CT PERFUSION:     FINDINGS: No  areas of cerebral blood flow less than 30% or Tmax greater  than 6 seconds are identified..     Study was placed on line at 2:16 a.m. Preliminary interpretation  telephoned to extension [phone] during interpretation at 2:37 a.m.     AI analysis of LVO was utilized.     Radiation dose reduction techniques were utilized, including automated  exposure control and exposure modulation based on body size.          Impression:      1. No acute intracranial findings.  2. Images do suggest indentations involving the posterior walls of the  proximal internal carotid arteries bilaterally. This is more significant  on the left. Appearance raises the possibility of bilateral carotid  webs.  3. Reformatted images suggest a possible filling defect within a distal  right M2 branch. However, I am unable to convincingly find this on the  source images.     Final report was called to Dr. Omer at 5:30 a.m.        This report was finalized on 11/13/2024 5:31 AM by Dr. Tika Zavala M.D on Workstation: [id number]       CT CEREBRAL PERFUSION WITH & WITHOUT CONTRAST [[id number]] Collected: 11/13/24 0444     Updated: 11/13/24 0534    Narrative:      NONCONTRAST CRANIAL CT SCAN, CT ANGIOGRAM NECK, CT ANGIOGRAM HEAD,  CRANIAL CT PERFUSION.     HISTORY: Found down.     COMPARISON: None..     TECHNIQUE:  Radiation dose reduction techniques were utilized, including  automated exposure control and exposure modulation based on body size.   Initially, a routine noncontrast cranial CT performed from the skull  base through the vertex region. After review, thin-section contrast  enhanced CT angiogram images obtained from the aortic arch through the  calvarial vertex utilizing angiographic technique. Multi projection 3-D  MIP reformatted images were supplemented and reviewed. Subsequently, CT  perfusion imaging performed with ischemia view software.     FINDINGS CRANIAL CT: No acute hemorrhage or midline shift is  demonstrated.. There is  atrophy. There is periventricular and deep white  matter microangiopathic change. Postcontrast imaging does not  demonstrate any abnormal enhancement or venous occlusion. Bone window  images demonstrate some mucosal thickening within the ethmoid sinuses.     Study was placed on line at 2:05 a.m. Preliminary interpretation  telephoned to extension [phone] during interpretation at 2:11 a.m.           CERVICAL CAROTID CT ANGIOGRAM:     FINDINGS: There is normal arch anatomy. The common carotid arteries are  widely patent. There is some plaque noted at the left carotid  bifurcation. Images do suggest indentations along the posterior walls of  the proximal internal carotid arteries bilaterally, although more  significant on the left. Appearance raises the possibility of carotid  webs.. The internal carotid arteries are widely patent. The vertebral  arteries are patent throughout their courses. The left is dominant.     Images through the lung apices demonstrates nonspecific groundglass  opacities. Endotracheal tube terminates in satisfactory position. The  patient has a right internal jugular vein Mediport. There is some  intravenous air noted. There are changes of prior anterior cervical  spinal fusion at C3-C4 and C7-T1. There is also posterior spinal fusion  hardware noted within the thoracic spine.        NASCET criteria utilized in stenosis measurements. stenosis mild, 0-49%.        CRANIAL CTA ANGIOGRAM:     FINDINGS: The intracranial vertebral arteries are patent. The left is  dominant. Basilar artery is patent. There is a fetal origin of the right  posterior cerebral artery. There is a posterior communicating artery on  the left. Posterior cerebral arteries are patent bilaterally.  Intracranial internal carotid arteries are patent. There is an anterior  communicating artery. The anterior cerebral and middle cerebral arteries  are patent bilaterally. Reformatted images suggest a possible filling  defect within the  distal right P2 branch. However, I'm unable to  definitively find on the source images. It may be artifactual.              CT PERFUSION:     FINDINGS: No areas of cerebral blood flow less than 30% or Tmax greater  than 6 seconds are identified..     Study was placed on line at 2:16 a.m. Preliminary interpretation  telephoned to extension [phone] during interpretation at 2:37 a.m.     AI analysis of LVO was utilized.     Radiation dose reduction techniques were utilized, including automated  exposure control and exposure modulation based on body size.          Impression:      1. No acute intracranial findings.  2. Images do suggest indentations involving the posterior walls of the  proximal internal carotid arteries bilaterally. This is more significant  on the left. Appearance raises the possibility of bilateral carotid  webs.  3. Reformatted images suggest a possible filling defect within a distal  right M2 branch. However, I am unable to convincingly find this on the  source images.     Final report was called to Dr. Omer at 5:30 a.m.        This report was finalized on 11/13/2024 5:31 AM by Dr. Tika Zavala M.D on Workstation: [id number]       XR Chest 1 View [[id number]] Collected: 11/13/24 0206     Updated: 11/13/24 0211    Narrative:      SINGLE VIEW OF THE CHEST     HISTORY: Altered mental status     COMPARISON: None available.     FINDINGS:  There is cardiomegaly. There is no vascular congestion. No pneumothorax  or pleural effusion is seen. There appears to be scarring versus  atelectasis at the lung bases. Linear hyperdensities are noted at the  right lung base. Endotracheal tube terminates in satisfactory position.  Right internal jugular vein Mediport appears to extend into the right  atrium.       Impression:      Endotracheal tube terminates in satisfactory position.     This report was finalized on 11/13/2024 2:08 AM by Dr. Tika Zavala M.D on Workstation: [id number]                      TEST  RESULTS PENDING AT DISCHARGE      Discharge Exam:  Alert and oriented x 4, in NAD  Supple neck, midline trach  RRR, no m/r/g, no edema  Clear bilaterally, no wheezing, nonlabored  No clubbing or cyanosis     Disposition:  Home    Patient Instructions:      Discharge Medications        Continue These Medications        Instructions Start Date   aspirin 81 MG EC tablet   1 tablet, Daily      celecoxib 200 MG capsule  Commonly known as: CeleBREX   200 mg, Oral, 2 Times Daily      clonazePAM 0.5 MG tablet  Commonly known as: KlonoPIN   0.5 mg, Daily PRN      doxepin 100 MG capsule  Commonly known as: SINEquan   100 mg, Nightly      DULoxetine 60 MG capsule  Commonly known as: CYMBALTA   1 capsule, Every 12 Hours Scheduled      Eliquis 2.5 MG tablet tablet  Generic drug: apixaban   1 tablet, Oral, Every 12 Hours Scheduled      furosemide 40 MG tablet  Commonly known as: LASIX   1 tablet, Oral, Daily      gabapentin 800 MG tablet  Commonly known as: NEURONTIN   800 mg, 3 Times Daily      hydrOXYzine 25 MG tablet  Commonly known as: ATARAX   1-2 tablets, 3 Times Daily PRN      lisinopril 10 MG tablet  Commonly known as: PRINIVIL,ZESTRIL   1 tablet, Daily      oxyCODONE-acetaminophen  MG per tablet  Commonly known as: PERCOCET   1 tablet, 5 Times Daily PRN      pantoprazole 40 MG EC tablet  Commonly known as: PROTONIX   1 tablet, Every 12 Hours Scheduled      potassium chloride 10 MEQ CR tablet   1 tablet, Daily      rosuvastatin 5 MG tablet  Commonly known as: CRESTOR   1 tablet, Daily      Trintellix 10 MG tablet tablet  Generic drug: Vortioxetine HBr   10 mg, Oral, Daily With Breakfast      Unithroid 150 MCG tablet  Generic drug: levothyroxine   150 mcg, Every Early Morning      zolpidem 10 MG tablet  Commonly known as: AMBIEN   10 mg, Nightly PRN             Stop These Medications      ferrous gluconate 324 MG tablet  Commonly known as: FERGON     hydroCHLOROthiazide 25 MG tablet               Your  medication list        CONTINUE taking these medications        Instructions Last Dose Given Next Dose Due   aspirin 81 MG EC tablet      Take 1 tablet by mouth Daily.       celecoxib 200 MG capsule  Commonly known as: CeleBREX      Take 1 capsule by mouth 2 (Two) Times a Day.       clonazePAM 0.5 MG tablet  Commonly known as: KlonoPIN      Take 1 tablet by mouth Daily As Needed for Anxiety.       doxepin 100 MG capsule  Commonly known as: SINEquan      Take 1 capsule by mouth Every Night.       DULoxetine 60 MG capsule  Commonly known as: CYMBALTA      Take 1 capsule by mouth Every 12 (Twelve) Hours.       Eliquis 2.5 MG tablet tablet  Generic drug: apixaban      Take 1 tablet by mouth Every 12 (Twelve) Hours.       furosemide 40 MG tablet  Commonly known as: LASIX      Take 1 tablet by mouth Daily.       gabapentin 800 MG tablet  Commonly known as: NEURONTIN      Take 1 tablet by mouth 3 (Three) Times a Day.       hydrOXYzine 25 MG tablet  Commonly known as: ATARAX      Take 1-2 tablets by mouth 3 (Three) Times a Day As Needed for Anxiety.       lisinopril 10 MG tablet  Commonly known as: PRINIVIL,ZESTRIL      Take 1 tablet by mouth Daily.       oxyCODONE-acetaminophen  MG per tablet  Commonly known as: PERCOCET      Take 1 tablet by mouth 5 (Five) Times a Day As Needed for Moderate Pain.       pantoprazole 40 MG EC tablet  Commonly known as: PROTONIX      Take 1 tablet by mouth Every 12 (Twelve) Hours.       potassium chloride 10 MEQ CR tablet      Take 1 tablet by mouth Daily.       rosuvastatin 5 MG tablet  Commonly known as: CRESTOR      Take 1 tablet by mouth Daily.       Trintellix 10 MG tablet tablet  Generic drug: Vortioxetine HBr      Take 1 tablet by mouth Daily With Breakfast.       Unithroid 150 MCG tablet  Generic drug: levothyroxine      Take 1 tablet by mouth Every Morning.       zolpidem 10 MG tablet  Commonly known as: AMBIEN      Take 1 tablet by mouth At Night As Needed for Sleep.               STOP taking these medications      ferrous gluconate 324 MG tablet  Commonly known as: FERGON        hydroCHLOROthiazide 25 MG tablet                     Medication Reconciliation: Please see electronically completed Med Rec.         Total time spent discharging patient including evaluation, medication reconciliation, arranging follow up, and post hospitalization instructions and education to patient and family total time exceeds 30 minutes.    Signed:  Keven Bailey MD  11/14/2024  10:31 EST

## 2024-11-14 NOTE — PROGRESS NOTES
Case Management Discharge Note      Final Note: DC home and current with [facility]         Selected Continued Care - Discharged on 11/14/2024 Admission date: 11/13/2024 - Discharge disposition: Home or Self Care      Destination    No services have been selected for the patient.                Durable Medical Equipment    No services have been selected for the patient.                Dialysis/Infusion    No services have been selected for the patient.                Home Medical Care Coordination complete.      Service Provider Services Address Phone Fax Patient Preferred    [facility] [address] [place] [id number] [phone] --              Therapy    No services have been selected for the patient.                Community Resources    No services have been selected for the patient.                Community & DME    No services have been selected for the patient.                         Final Discharge Disposition Code: 06 - home with home health care

## 2024-11-14 NOTE — NURSING NOTE
Pt transferred to CCU. Report called, and pt and family updated on xfer. Pt assessed prior to transfer. Pt A&O x4, moves well, NIH-0. No other events to note.

## 2024-11-14 NOTE — PROGRESS NOTES
[facility] to resume home health SN,PT and OT.  Noted patient is D/Cing today.  Office notified.

## 2024-11-14 NOTE — PROGRESS NOTES
"DOS: 2024  NAME: Paola Reid   : 1960  PCP: Akil Mcnally DO  Chief Complaint   Patient presents with    Loss of Consciousness     Possible OD     Neurology    Subjective: Patient back to baseline today.  Brother-in-law and sister at bedside. Asking about going home.    Objective:  Vital signs: /84 (BP Location: Left arm, Patient Position: Lying)   Pulse 104   Temp 98.1 °F (36.7 °C) (Oral)   Resp 12   Ht 167.6 cm (66\")   Wt 70.5 kg (155 lb 6.8 oz)   SpO2 100%   BMI 25.09 kg/m²       GEN: NAD, v/s reviewed  HEENT: Normocephalic, atraumatic   COR: RRR  Resp: Even and unlabored, clear to auscultation bilaterally  Extremities: R knee post surgical swelling    Neurological:   MS: AOx3. Mildly impaired recent memory, good attention/concentration. Language normal. No neglect.   CN: Visual fields intact bilaterally, PERRL, extraocular movements intact, normal facial sensation, face symmetric, tongue midline, no dysarthria  Motor: 5/5 in bilateral upper and left lower extremity, right lower extremity 4 out of 5, antalgic, normal tone, no asterixis  Sensory: Intact to light touch in all extremities  Coordination: Normal finger-to-nose bilaterally    Scheduled Meds:chlorhexidine, 15 mL, Mouth/Throat, Q12H  mupirocin, 1 Application, Each Nare, BID  sodium chloride, 10 mL, Intravenous, Q12H      Continuous Infusions:   PRN Meds:.  acetaminophen **OR** acetaminophen    dextrose    sodium chloride    sodium chloride    sodium chloride    traMADol    Laboratory results:  Lab Results   Component Value Date    GLUCOSE 88 2024    CALCIUM 9.0 2024     (L) 2024    K 3.3 (L) 2024    CO2 27.0 2024    CL 92 (L) 2024    BUN 14 2024    CREATININE 0.64 2024    BCR 21.9 2024    ANIONGAP 14.0 2024     Lab Results   Component Value Date    WBC 8.30 2024    HGB 11.5 (L) 2024    HCT 34.1 2024    MCV 90.7 2024     " "11/14/2024     No results found for: \"CHOL\"  No results found for: \"HDL\"  No results found for: \"LDL\"  No results found for: \"TRIG\"       Review and interpretation of imaging: MRI brain images viewed by me, no acute findings seen.  MRI Brain With & Without Contrast    Result Date: 11/14/2024  MRI of the brain with and without contrast on 11/14/2024  Clinical history: This is a 64-year-old female patient with acute altered mental status and encephalopathy.  TECHNIQUE: Axial T1, FLAIR, fat-suppressed T2, axial diffusion and gradient echo T2, sagittal T1 and postcontrast axial fat-suppressed T1 and coronal T1-weighted images were obtained of the entire head  There are no prior MRIs of the brain for comparison. This is correlated to yesterday contrast-enhanced CT angiogram of the head neck and CT perfusion study the brain on 11/13/2024 at 2:00 a.m.  FINDINGS: There are multiple patchy and nodular foci of T2 and FLAIR hyperintensity in the periventricular and subcortical white matter of the cerebral hemispheres most consistent with mild-moderate small vessel disease. The remainder of the brain parenchyma is normal in signal intensity. Specifically, no diffusion weighted abnormality is identified with no acute infarct identified. On the gradient echo T2 weighted images, no acute or old blood breakdown products are seen intracranially. The ventricles are normal in size. I see no focal mass effect. There is no midline shift. No extra-axial fluid collections are identified. No abnormal areas of enhancement are seen in the head. The paranasal sinuses are clear. There is a tiny amount of fluid in the posterior right mastoid air cells; otherwise, the mastoid air cells and middle ear cavities are clear. There are lens implants in place in the globes bilaterally from previous bilateral cataract surgery. Otherwise, the orbits are unremarkable. Good flow voids are demonstrated within the cerebral vessels and in the dural venous " sinuses. On the sagittal T1-weighted images there is evidence of previous anterior cervical discectomy and fusion procedure with anterior plate and screw fixation at the C3-4 cervical level. The calvarium and skull base demonstrate normal marrow signal intensity.      1. No acute intracranial abnormality is identified 2. There is mild-moderate small vessel disease in the cerebral white matter. There is a tiny mount of fluid in the posterior right mastoid air cells. There are lens implants in place in the globes bilaterally from previous bilateral cataract surgery. There is been a previous anterior cervical discectomy and fusion procedure at the C3-4 cervical level with anterior plate and screw fixation. The remainder of the MRI of the brain is normal. Specifically, no acute infarct is identified.  This report was finalized on 11/14/2024 7:35 AM by Dr. J Luis Sandhu M.D on Workstation: [id number]      EEG    Addendum Date: 11/13/2024    Correction: Up to 9 Hz alpha activity is noted over the posterior head regions that is symmetric, moderately well formed and reactive to eye closure.     Result Date: 11/13/2024  Table formatting from the original result was not included. Date of onset: November 13, 2024 Date of offset: November 13, 2024 Indication: acute encephalopathy Technical description:  This is a 21 channel digital EEG recording that began on 1240 and ended on 1307. Background:  Up to 9 Hz alpha activity is noted over the posterior head regions that is symmetric, well formed and reactive to eye closure.  The patient enters the drowsy state and sleeps during the record.  Sleep activities are symmetric and contain sleep spindles.  Hyperventilation was not performed and photic stimulation did not induce an abnormal driving response.  There are no marked continuous asymmetries between the two hemispheres.  No interictal epileptiform activity is present. The EKG monitor is low amplitude and hard to read but does  appear to be within normal limits in terms of rate with these limitations in mind. Clinical Interpretation:  This routine  EEG recording is normal in the awake and sleep states.  No potentially epileptogenic activity, seizure activity, or focal slowing is present.    CT Angiogram Head w AI Analysis of LVO    Result Date: 11/13/2024  NONCONTRAST CRANIAL CT SCAN, CT ANGIOGRAM NECK, CT ANGIOGRAM HEAD, CRANIAL CT PERFUSION.  HISTORY: Found down.  COMPARISON: None..  TECHNIQUE:  Radiation dose reduction techniques were utilized, including automated exposure control and exposure modulation based on body size. Initially, a routine noncontrast cranial CT performed from the skull base through the vertex region. After review, thin-section contrast enhanced CT angiogram images obtained from the aortic arch through the calvarial vertex utilizing angiographic technique. Multi projection 3-D MIP reformatted images were supplemented and reviewed. Subsequently, CT perfusion imaging performed with ischemia view software.  FINDINGS CRANIAL CT: No acute hemorrhage or midline shift is demonstrated.. There is atrophy. There is periventricular and deep white matter microangiopathic change. Postcontrast imaging does not demonstrate any abnormal enhancement or venous occlusion. Bone window images demonstrate some mucosal thickening within the ethmoid sinuses.  Study was placed on line at 2:05 a.m. Preliminary interpretation telephoned to extension [phone] during interpretation at 2:11 a.m.    CERVICAL CAROTID CT ANGIOGRAM:  FINDINGS: There is normal arch anatomy. The common carotid arteries are widely patent. There is some plaque noted at the left carotid bifurcation. Images do suggest indentations along the posterior walls of the proximal internal carotid arteries bilaterally, although more significant on the left. Appearance raises the possibility of carotid webs.. The internal carotid arteries are widely patent. The vertebral arteries are  patent throughout their courses. The left is dominant.  Images through the lung apices demonstrates nonspecific groundglass opacities. Endotracheal tube terminates in satisfactory position. The patient has a right internal jugular vein Mediport. There is some intravenous air noted. There are changes of prior anterior cervical spinal fusion at C3-C4 and C7-T1. There is also posterior spinal fusion hardware noted within the thoracic spine.   NASCET criteria utilized in stenosis measurements. stenosis mild, 0-49%.   CRANIAL CTA ANGIOGRAM:  FINDINGS: The intracranial vertebral arteries are patent. The left is dominant. Basilar artery is patent. There is a fetal origin of the right posterior cerebral artery. There is a posterior communicating artery on the left. Posterior cerebral arteries are patent bilaterally. Intracranial internal carotid arteries are patent. There is an anterior communicating artery. The anterior cerebral and middle cerebral arteries are patent bilaterally. Reformatted images suggest a possible filling defect within the distal right P2 branch. However, I'm unable to definitively find on the source images. It may be artifactual.     CT PERFUSION:  FINDINGS: No areas of cerebral blood flow less than 30% or Tmax greater than 6 seconds are identified..  Study was placed on line at 2:16 a.m. Preliminary interpretation telephoned to extension [phone] during interpretation at 2:37 a.m.  AI analysis of LVO was utilized.  Radiation dose reduction techniques were utilized, including automated exposure control and exposure modulation based on body size.       1. No acute intracranial findings. 2. Images do suggest indentations involving the posterior walls of the proximal internal carotid arteries bilaterally. This is more significant on the left. Appearance raises the possibility of bilateral carotid webs. 3. Reformatted images suggest a possible filling defect within a distal right M2 branch. However, I am  unable to convincingly find this on the source images.  Final report was called to Dr. Omer at 5:30 a.m.   This report was finalized on 11/13/2024 5:31 AM by Dr. Tika Zavala M.D on Workstation: [id number]      CT Angiogram Neck    Result Date: 11/13/2024  NONCONTRAST CRANIAL CT SCAN, CT ANGIOGRAM NECK, CT ANGIOGRAM HEAD, CRANIAL CT PERFUSION.  HISTORY: Found down.  COMPARISON: None..  TECHNIQUE:  Radiation dose reduction techniques were utilized, including automated exposure control and exposure modulation based on body size. Initially, a routine noncontrast cranial CT performed from the skull base through the vertex region. After review, thin-section contrast enhanced CT angiogram images obtained from the aortic arch through the calvarial vertex utilizing angiographic technique. Multi projection 3-D MIP reformatted images were supplemented and reviewed. Subsequently, CT perfusion imaging performed with ischemia view software.  FINDINGS CRANIAL CT: No acute hemorrhage or midline shift is demonstrated.. There is atrophy. There is periventricular and deep white matter microangiopathic change. Postcontrast imaging does not demonstrate any abnormal enhancement or venous occlusion. Bone window images demonstrate some mucosal thickening within the ethmoid sinuses.  Study was placed on line at 2:05 a.m. Preliminary interpretation telephoned to extension [phone] during interpretation at 2:11 a.m.    CERVICAL CAROTID CT ANGIOGRAM:  FINDINGS: There is normal arch anatomy. The common carotid arteries are widely patent. There is some plaque noted at the left carotid bifurcation. Images do suggest indentations along the posterior walls of the proximal internal carotid arteries bilaterally, although more significant on the left. Appearance raises the possibility of carotid webs.. The internal carotid arteries are widely patent. The vertebral arteries are patent throughout their courses. The left is dominant.  Images  through the lung apices demonstrates nonspecific groundglass opacities. Endotracheal tube terminates in satisfactory position. The patient has a right internal jugular vein Mediport. There is some intravenous air noted. There are changes of prior anterior cervical spinal fusion at C3-C4 and C7-T1. There is also posterior spinal fusion hardware noted within the thoracic spine.   NASCET criteria utilized in stenosis measurements. stenosis mild, 0-49%.   CRANIAL CTA ANGIOGRAM:  FINDINGS: The intracranial vertebral arteries are patent. The left is dominant. Basilar artery is patent. There is a fetal origin of the right posterior cerebral artery. There is a posterior communicating artery on the left. Posterior cerebral arteries are patent bilaterally. Intracranial internal carotid arteries are patent. There is an anterior communicating artery. The anterior cerebral and middle cerebral arteries are patent bilaterally. Reformatted images suggest a possible filling defect within the distal right P2 branch. However, I'm unable to definitively find on the source images. It may be artifactual.     CT PERFUSION:  FINDINGS: No areas of cerebral blood flow less than 30% or Tmax greater than 6 seconds are identified..  Study was placed on line at 2:16 a.m. Preliminary interpretation telephoned to extension [phone] during interpretation at 2:37 a.m.  AI analysis of LVO was utilized.  Radiation dose reduction techniques were utilized, including automated exposure control and exposure modulation based on body size.       1. No acute intracranial findings. 2. Images do suggest indentations involving the posterior walls of the proximal internal carotid arteries bilaterally. This is more significant on the left. Appearance raises the possibility of bilateral carotid webs. 3. Reformatted images suggest a possible filling defect within a distal right M2 branch. However, I am unable to convincingly find this on the source images.  Final  report was called to Dr. Omer at 5:30 a.m.   This report was finalized on 11/13/2024 5:31 AM by Dr. Tika Zavala M.D on Workstation: [id number]      CT CEREBRAL PERFUSION WITH & WITHOUT CONTRAST    Result Date: 11/13/2024  NONCONTRAST CRANIAL CT SCAN, CT ANGIOGRAM NECK, CT ANGIOGRAM HEAD, CRANIAL CT PERFUSION.  HISTORY: Found down.  COMPARISON: None..  TECHNIQUE:  Radiation dose reduction techniques were utilized, including automated exposure control and exposure modulation based on body size. Initially, a routine noncontrast cranial CT performed from the skull base through the vertex region. After review, thin-section contrast enhanced CT angiogram images obtained from the aortic arch through the calvarial vertex utilizing angiographic technique. Multi projection 3-D MIP reformatted images were supplemented and reviewed. Subsequently, CT perfusion imaging performed with ischemia view software.  FINDINGS CRANIAL CT: No acute hemorrhage or midline shift is demonstrated.. There is atrophy. There is periventricular and deep white matter microangiopathic change. Postcontrast imaging does not demonstrate any abnormal enhancement or venous occlusion. Bone window images demonstrate some mucosal thickening within the ethmoid sinuses.  Study was placed on line at 2:05 a.m. Preliminary interpretation telephoned to extension [phone] during interpretation at 2:11 a.m.    CERVICAL CAROTID CT ANGIOGRAM:  FINDINGS: There is normal arch anatomy. The common carotid arteries are widely patent. There is some plaque noted at the left carotid bifurcation. Images do suggest indentations along the posterior walls of the proximal internal carotid arteries bilaterally, although more significant on the left. Appearance raises the possibility of carotid webs.. The internal carotid arteries are widely patent. The vertebral arteries are patent throughout their courses. The left is dominant.  Images through the lung apices demonstrates  nonspecific groundglass opacities. Endotracheal tube terminates in satisfactory position. The patient has a right internal jugular vein Mediport. There is some intravenous air noted. There are changes of prior anterior cervical spinal fusion at C3-C4 and C7-T1. There is also posterior spinal fusion hardware noted within the thoracic spine.   NASCET criteria utilized in stenosis measurements. stenosis mild, 0-49%.   CRANIAL CTA ANGIOGRAM:  FINDINGS: The intracranial vertebral arteries are patent. The left is dominant. Basilar artery is patent. There is a fetal origin of the right posterior cerebral artery. There is a posterior communicating artery on the left. Posterior cerebral arteries are patent bilaterally. Intracranial internal carotid arteries are patent. There is an anterior communicating artery. The anterior cerebral and middle cerebral arteries are patent bilaterally. Reformatted images suggest a possible filling defect within the distal right P2 branch. However, I'm unable to definitively find on the source images. It may be artifactual.     CT PERFUSION:  FINDINGS: No areas of cerebral blood flow less than 30% or Tmax greater than 6 seconds are identified..  Study was placed on line at 2:16 a.m. Preliminary interpretation telephoned to extension [phone] during interpretation at 2:37 a.m.  AI analysis of LVO was utilized.  Radiation dose reduction techniques were utilized, including automated exposure control and exposure modulation based on body size.       1. No acute intracranial findings. 2. Images do suggest indentations involving the posterior walls of the proximal internal carotid arteries bilaterally. This is more significant on the left. Appearance raises the possibility of bilateral carotid webs. 3. Reformatted images suggest a possible filling defect within a distal right M2 branch. However, I am unable to convincingly find this on the source images.  Final report was called to Dr. Omer at 5:30  conner   This report was finalized on 11/13/2024 5:31 AM by Dr. Tika Zavala M.D on Workstation: [id number]      XR Chest 1 View    Result Date: 11/13/2024  SINGLE VIEW OF THE CHEST  HISTORY: Altered mental status  COMPARISON: None available.  FINDINGS: There is cardiomegaly. There is no vascular congestion. No pneumothorax or pleural effusion is seen. There appears to be scarring versus atelectasis at the lung bases. Linear hyperdensities are noted at the right lung base. Endotracheal tube terminates in satisfactory position. Right internal jugular vein Mediport appears to extend into the right atrium.      Endotracheal tube terminates in satisfactory position.  This report was finalized on 11/13/2024 2:08 AM by Dr. Tika Zavala M.D on Workstation: [id number]       Impression:  64-year-old female with past medical history of anemia, anxiety, chronic pain, rheumatoid arthritis, hypothyroidism, hypogammaglobinemia status post recent right total knee arthroplasty 10/30 who presented 11/13 for lethargy which started late in the evening.  EMS gave 2 mg of intranasal Narcan prior to ED presentation without improvement.  In the ED she was unresponsive and was intubated.  CTA head/neck showed possible filling defect in right M2 branch, not well-seen on source images, without abnormal perfusion.  Routine EEG unremarkable. MRI brain w/wo unremarkable.  Initial CBC showed white count of 11.6, CMP unremarkable, TSH 4.5, free T41.35,Alcohol level not elevated, UDS positive for fentanyl, lactic acid 2.9 initially, down to 0.9.    Patient is now back to baseline.  There is concern polypharmacy may have contributed as she had worsening knee pain after working with PT for which she took a pain medication and then later take all of her evening medications around 10 PM followed by this episode of decreased responsiveness.  She does not believe she took anything extra.  Home medication list includes as needed Klonopin,  doxepin nightly, Cymbalta, Trintellix, gabapentin 800 mg 3 times daily, as needed Atarax, Percocet, and Ambien 10 mg nightly.    Diagnosis:  Acute encephalopathy, suspect most likely related to toxic metabolic encephalopathy, consider polypharmacy, back to baseline with holding medications    Plan:  No further neurological workup planned.  Recommend considering streamlining/reducing sedating medications as outpatient.  Discussed with Dr. Omer and Dr. Bailey.  Neurology will sign off but please call if further questions or concerns.

## 2024-11-14 NOTE — PROGRESS NOTES
Continued Stay Note  [facility]     Patient Name: Paola Reid  MRN: [id number]  Today's Date: 11/14/2024    Admit Date: 11/13/2024    Plan: Home with [facility]   Discharge Plan       Row Name 11/14/24 1152       Plan    Plan Home with [facility]    Plan Comments Spoke with pt at bedside to confirm her DC plan.  Pt will return home today and is currrent with [facility] for home therapy and nursing.  Referral [facility] in [facility] and called to [facility].  Pt does have transportation home at [place] today.                   Discharge Codes    No documentation.                 Expected Discharge Date and Time       Expected Discharge Date Expected Discharge Time    Nov 14, 2024               REX Haynes

## 2024-11-14 NOTE — DISCHARGE PLACEMENT REQUEST
"Eric Ortiz (64 y.o. Female)       Date of Birth   1960    Social Security Number       Address   [address] [facility] [address]    Home Phone   [phone]    MRN   [id number]       [employer]   None    Marital Status   Single                            Admission Date   11/13/24    Admission Type   Emergency    Admitting Provider   Edmundo Garvin MD    Attending Provider   Edmundo Garvin MD    Department, Room/Bed   [facility], N336/1       Discharge Date       Discharge Disposition   Home or Self Care    Discharge Destination                                 Attending Provider: Edmundo Garvin MD    Allergies: Baclofen, Meperidine    Isolation: None   Infection: None   Code Status: CPR    Ht: 167.6 cm (66\")   Wt: 70.5 kg (155 lb 6.8 oz)    Admission Cmt: None   Principal Problem: Acute respiratory failure, unspecified whether with hypoxia or hypercapnia [J96.00]                   Active Insurance as of 11/13/2024       Primary Coverage       Payor Plan Insurance Group Employer/Plan Group    MEDICARE MEDICARE A & B        Payor Plan Address Payor Plan Phone Number Payor Plan Fax Number Effective Dates    [address] [phone]  7/1/2005 - None Entered    [place] [facility] [id number]         Subscriber Name Subscriber Birth Date Member ID       ERIC ORTIZ 1960 [id number]               Secondary Coverage       Payor Plan Insurance Group Employer/Plan Group    [facility] SUPP [id number]       Payor Plan Address Payor Plan Phone Number Payor Plan Fax Number Effective Dates    [address]   1/1/2024 - None Entered    [facility] [id number]         Subscriber Name Subscriber Birth Date Member ID       ERIC ORTIZ 1960 [id number]                     Emergency Contacts        (Rel.) Home Phone Work Phone Mobile Phone    GRIFFITH,TOMMY (Sister) [phone]    CHRISTINA ORTIZ (Son) [phone] -- " --

## 2024-11-14 NOTE — NURSING NOTE
Patient noted to have 503mL on Bladder scan after voiding in the bedside commode. Patient refused straight cath. Will notify provider about high post-void residuals.

## 2024-11-14 NOTE — PROGRESS NOTES
[facility] is current with SN,PT,OT.  Will continue to follow for D/C needs.

## 2024-11-15 ENCOUNTER — INFUSION (OUTPATIENT)
Dept: ONCOLOGY | Facility: HOSPITAL | Age: 64
End: 2024-11-15
Payer: MEDICARE

## 2024-11-15 VITALS
OXYGEN SATURATION: 100 % | HEART RATE: 103 BPM | SYSTOLIC BLOOD PRESSURE: 126 MMHG | DIASTOLIC BLOOD PRESSURE: 72 MMHG | RESPIRATION RATE: 18 BRPM | TEMPERATURE: 97.4 F

## 2024-11-15 VITALS
TEMPERATURE: 97.4 F | DIASTOLIC BLOOD PRESSURE: 71 MMHG | RESPIRATION RATE: 15 BRPM | HEART RATE: 96 BPM | BODY MASS INDEX: 25.2 KG/M2 | WEIGHT: 156.8 LBS | HEIGHT: 66 IN | SYSTOLIC BLOOD PRESSURE: 110 MMHG

## 2024-11-15 DIAGNOSIS — I87.8 POOR VENOUS ACCESS: ICD-10-CM

## 2024-11-15 DIAGNOSIS — Z45.2 ENCOUNTER FOR ADJUSTMENT OR MANAGEMENT OF VASCULAR ACCESS DEVICE: ICD-10-CM

## 2024-11-15 DIAGNOSIS — D80.1 HYPOGAMMAGLOBULINEMIA: Primary | ICD-10-CM

## 2024-11-15 LAB
BASOPHILS # BLD AUTO: 0.1 10*3/MM3 (ref 0–0.2)
BASOPHILS NFR BLD AUTO: 1 % (ref 0–1.5)
DEPRECATED RDW RBC AUTO: 45.8 FL (ref 37–54)
EOSINOPHIL # BLD AUTO: 0.56 10*3/MM3 (ref 0–0.4)
EOSINOPHIL NFR BLD AUTO: 5.8 % (ref 0.3–6.2)
ERYTHROCYTE [DISTWIDTH] IN BLOOD BY AUTOMATED COUNT: 13.8 % (ref 12.3–15.4)
HCT VFR BLD AUTO: 30.7 % (ref 34–46.6)
HGB BLD-MCNC: 10.3 G/DL (ref 12–15.9)
IGA1 MFR SER: 269 MG/DL (ref 70–400)
IGG1 SER-MCNC: 836 MG/DL (ref 700–1600)
IGM SERPL-MCNC: 54 MG/DL (ref 40–230)
IMM GRANULOCYTES # BLD AUTO: 0.04 10*3/MM3 (ref 0–0.05)
IMM GRANULOCYTES NFR BLD AUTO: 0.4 % (ref 0–0.5)
LYMPHOCYTES # BLD AUTO: 2.26 10*3/MM3 (ref 0.7–3.1)
LYMPHOCYTES NFR BLD AUTO: 23.4 % (ref 19.6–45.3)
MCH RBC QN AUTO: 30.2 PG (ref 26.6–33)
MCHC RBC AUTO-ENTMCNC: 33.6 G/DL (ref 31.5–35.7)
MCV RBC AUTO: 90 FL (ref 79–97)
MONOCYTES # BLD AUTO: 0.68 10*3/MM3 (ref 0.1–0.9)
MONOCYTES NFR BLD AUTO: 7 % (ref 5–12)
NEUTROPHILS NFR BLD AUTO: 6.03 10*3/MM3 (ref 1.7–7)
NEUTROPHILS NFR BLD AUTO: 62.4 % (ref 42.7–76)
NRBC BLD AUTO-RTO: 0 /100 WBC (ref 0–0.2)
PLATELET # BLD AUTO: 389 10*3/MM3 (ref 140–450)
PMV BLD AUTO: 9.1 FL (ref 6–12)
RBC # BLD AUTO: 3.41 10*6/MM3 (ref 3.77–5.28)
WBC NRBC COR # BLD AUTO: 9.67 10*3/MM3 (ref 3.4–10.8)

## 2024-11-15 PROCEDURE — 25010000002 HEPARIN LOCK FLUSH PER 10 UNITS: Performed by: INTERNAL MEDICINE

## 2024-11-15 PROCEDURE — 82784 ASSAY IGA/IGD/IGG/IGM EACH: CPT | Performed by: INTERNAL MEDICINE

## 2024-11-15 PROCEDURE — 63710000001 DIPHENHYDRAMINE PER 50 MG: Performed by: NURSE PRACTITIONER

## 2024-11-15 PROCEDURE — 96365 THER/PROPH/DIAG IV INF INIT: CPT

## 2024-11-15 PROCEDURE — 25010000002 IMMUNE GLOBULIN (HUMAN) 10 GM/100ML SOLUTION: Performed by: NURSE PRACTITIONER

## 2024-11-15 PROCEDURE — 85025 COMPLETE CBC W/AUTO DIFF WBC: CPT | Performed by: INTERNAL MEDICINE

## 2024-11-15 RX ORDER — DIPHENHYDRAMINE HCL 25 MG
25 CAPSULE ORAL ONCE
Status: COMPLETED | OUTPATIENT
Start: 2024-11-15 | End: 2024-11-15

## 2024-11-15 RX ORDER — SODIUM CHLORIDE 0.9 % (FLUSH) 0.9 %
10 SYRINGE (ML) INJECTION AS NEEDED
Status: DISCONTINUED | OUTPATIENT
Start: 2024-11-15 | End: 2024-11-15 | Stop reason: HOSPADM

## 2024-11-15 RX ORDER — HEPARIN SODIUM (PORCINE) LOCK FLUSH IV SOLN 100 UNIT/ML 100 UNIT/ML
500 SOLUTION INTRAVENOUS AS NEEDED
OUTPATIENT
Start: 2024-11-15

## 2024-11-15 RX ORDER — SODIUM CHLORIDE 0.9 % (FLUSH) 0.9 %
10 SYRINGE (ML) INJECTION AS NEEDED
OUTPATIENT
Start: 2024-11-15

## 2024-11-15 RX ORDER — ACETAMINOPHEN 325 MG/1
650 TABLET ORAL ONCE
Status: COMPLETED | OUTPATIENT
Start: 2024-11-15 | End: 2024-11-15

## 2024-11-15 RX ORDER — HEPARIN SODIUM (PORCINE) LOCK FLUSH IV SOLN 100 UNIT/ML 100 UNIT/ML
500 SOLUTION INTRAVENOUS AS NEEDED
Status: DISCONTINUED | OUTPATIENT
Start: 2024-11-15 | End: 2024-11-15 | Stop reason: HOSPADM

## 2024-11-15 RX ADMIN — IMMUNE GLOBULIN (HUMAN) 10 G: 10 INJECTION INTRAVENOUS; SUBCUTANEOUS at 13:28

## 2024-11-15 RX ADMIN — ACETAMINOPHEN 650 MG: 325 TABLET ORAL at 13:09

## 2024-11-15 RX ADMIN — DIPHENHYDRAMINE HYDROCHLORIDE 25 MG: 25 CAPSULE ORAL at 13:09

## 2024-11-15 RX ADMIN — Medication 10 ML: at 14:52

## 2024-11-15 RX ADMIN — Medication 500 UNITS: at 14:52

## 2024-11-15 NOTE — PROGRESS NOTES
This patient is receiving IVIG, tolerating it without difficulty and continues to receive a benefit.  We plan to continue IVIG at the current dosage and schedule

## 2024-11-15 NOTE — HOME HEALTH
"SOC Note    RE:   Paola Reid    56    Home Health ordered for: disciplines SN, PT     Reason for Hosp/Primary Dx/Co-morbidities: Aftercare following joint replacement surgery.  Patient had surgery to RT knee.  VALERIE dressing in place.  Periwound is CDI, no redness, no outward ss infection at this time.    Focus of Care: aftercare following joint replacement surgery     Patient's goal(s):\"I want to be back to walking around without a walker or anything\"    Current Functional status/mobility/DME: Ambulates with walker, supervision due to 3 dogs in home     HB status/Living Arrangements: Lives in home by herself, three dogs in home (2 are large) that like to jump up on people.  Patient made a device to keep dogs from jumping on her leg.  Daughter is here to assist patient during appt     Skin Integrity/wound status: Incision with VALERIE dressing in place to rt knee     Code Status: full     Fall Risk/Safety concerns: High risk for falls due to dogs in home, rt knee surgery     Educated on Emergency Plan, steps to take prior to going to the ER and when to Call Home Health First:  yes     Medication issues/Concerns:  patient has obtained all meds and has in home.  Teaching on dosing for pain meds, timing of meds with other meds that could cause drowsiness as well     Additional Problems/Concerns: n/a     SDOH Barriers (i.e. caregiver concerns, social isolation, transportation, food insecurity, environment, income etc.)/Need for MSW: not at this time"

## 2024-11-17 LAB

## 2024-11-18 ENCOUNTER — HOME CARE VISIT (OUTPATIENT)
Dept: HOME HEALTH SERVICES | Facility: HOME HEALTHCARE | Age: 64
End: 2024-11-18
Payer: MEDICARE

## 2024-11-18 PROCEDURE — G0299 HHS/HOSPICE OF RN EA 15 MIN: HCPCS

## 2024-11-19 ENCOUNTER — HOME CARE VISIT (OUTPATIENT)
Dept: HOME HEALTH SERVICES | Facility: HOME HEALTHCARE | Age: 64
End: 2024-11-19
Payer: MEDICARE

## 2024-11-19 VITALS
RESPIRATION RATE: 18 BRPM | TEMPERATURE: 97.8 F | OXYGEN SATURATION: 97 % | SYSTOLIC BLOOD PRESSURE: 118 MMHG | HEART RATE: 97 BPM | DIASTOLIC BLOOD PRESSURE: 70 MMHG

## 2024-11-19 PROCEDURE — G0151 HHCP-SERV OF PT,EA 15 MIN: HCPCS

## 2024-11-19 PROCEDURE — G0152 HHCP-SERV OF OT,EA 15 MIN: HCPCS

## 2024-11-19 NOTE — Clinical Note
Kaylin-  Please forward a copy of this note to Dr. Mcnally with [facility].  Thanks-      Greetings,    PT performed a reassessment on ERIC ORTIZ,  56 following her hospitalization 24 - 24 at [facility] secondary to RESPIRATORY DEPRESSION/FAILURE & HYPOXEMIA due to POLYPHARMACY.  Pt required intubation for airway protection.    Pt demonstrates decline in ROM & functional mobility as compared to initial PT evaluation on 24 following DC from acute rehab.  She is at risk for falls as indicated by Tinetti score as well as home environment.  Pt has 3 dogs, one of which is constantly jumping & climbing on patient. She demonstrates impaired safety awareness with tendency to ambulate without an assistive device, holding to walls/furniture for support while also trying to control dogs by grasping their collar.      PLAN TO TREAT:  2w1, 1w1, 2w1    FOCUS OF CARE:  RIGHT TOTAL KNEE ARTHROPLASTY     MEDICAL NECESSITY FOR ONGOING SKILLED PHYSICAL THERAPY: Patient requires skilled physical therapy services for SURGICAL AFTERCARE OF TOTAL KNEE ARTHROPLASTY for management of lower extremity pain/edema and to address deficits in range of motion, strength and gait activities.     Please feel free to contact me if you have any questions.   Thank you,         Zina Small, PT         [phone]

## 2024-11-20 ENCOUNTER — HOME CARE VISIT (OUTPATIENT)
Dept: HOME HEALTH SERVICES | Facility: HOME HEALTHCARE | Age: 64
End: 2024-11-20
Payer: MEDICARE

## 2024-11-20 VITALS
WEIGHT: 150 LBS | RESPIRATION RATE: 20 BRPM | HEART RATE: 106 BPM | DIASTOLIC BLOOD PRESSURE: 68 MMHG | BODY MASS INDEX: 28.32 KG/M2 | TEMPERATURE: 97.7 F | HEIGHT: 61 IN | OXYGEN SATURATION: 93 % | SYSTOLIC BLOOD PRESSURE: 124 MMHG

## 2024-11-20 LAB — GLUCOSE BLDC GLUCOMTR-MCNC: NORMAL MG/DL

## 2024-11-20 NOTE — HOME HEALTH
"PHYSICAL THERAPY REASSESSMENT VISIT NOTE    REASON FOR HOSPITALIZATION:  Patient hospitalized at [facility] 11/13/24 - 11/14/24 secondary to RESPIRATRY DEPRESSION/FAILURE & HYPOXEMIA due to POLYPHARMACY.  Pt required intubation for airway protection.    SUBJECTIVE:  Patient states she's in a lot of pain.  Her dogs have been jumping on her.  She created a barrier to protect her knee from her dogs. She reports she fell & went to [facility] this past Sunday.  (Upon review of all hospital notes & discussion with pt, it was determined that she was confused about the timeline & her only recent hospitalization was as noted above)    MEDICATION CHANGES:  None    FALLS SINCE LAST VISIT:  None    MENTAL STATUS:  Alert and oriented x 3, however somewhat confused about the timing of the events regarding her most recent hospitalization.     EDEMA: Mild/moderate Right knee.  Calf soft & non-tender.  Tierra's negative.     WOUND / SKIN CONDITION:  12.5 cm Right knee incision is healed & open to air.  Picture uploaded into Epic.     SIGNS SYMPTOMS OF INFECTION:   None     POSTURE: Forward flexed posture secondary to multiple back surgeries (21 as per pt)     MUSCLE TONE/COORDINATION:   WNL    (R) KNEE ROM IN SITTING:  -25* to 118*.  She is able to obtain -20* extension with heel prop.    STRENGTH GRADES  (R) HIP & KNEE:  3+ to 4-/5.  (B) UE's & (L) LE WFL    BED MOBILITY    SUPINE<>SIT: Supervision  ROLLING/SCOOTING:  Independent    TRANSFERS   IN/OUT OF BED: Supervision- pt steps up onto 4\" platform with handle assist   SIT<>STAND:  Supervision    GAIT:  Pt ambulates with rolling walker demonstrating reciprocal pattern with decreased step length bailaterally, (+) Right hip trendelenburg.  Posture forward flexed.  She often takes steps without an assistive device with trunk flexed ~45*, holding to walls/furniture for support.  AMBULATORY DISTANCE:  75 ft  LEVEL OF ASSISTANCE:  Supervision     TINETTI SCORE:  16/28- indicates HIGH FALL " RISK  (TINETTI FALL RISK SCORING: Under 19= High  19-24= Moderate  25 & up= Low)    TIMED UP & GO SCORE:  29 seconds- indicates SLOWER MOBILITY    (TUG Mobility: High > 10 secTypical 10-19Slower 20-29Diminished>30)                             ASSESSMENT:  Reassessment performed this date following hospitalization for RESPIRATORY FAILURE & HYPOXEMIA RELATED TO POLYPHARMACY.  Pt demonstrates decline in ROM & functional mobility as compared to initial PT evaluation on 11/12/24 following DC from acute rehab.  She is at risk for falls as indicated by Tinetti score as well as home environment.  Pt has 3 dogs, one of which is constantly jumping & climbing on patient. She demonstrates impaired safety awareness with tendency to ambulate without an assistive device, holding to walls/furniture for support while also trying to control dogs by grasping their collar.      PLAN TO TREAT:  2w1, 1w1, 2w1    FOCUS OF CARE:  RIGHT TOTAL KNEE ARTHROPLASTY     MEDICAL NECESSITY FOR ONGOING SKILLED PHYSICAL THERAPY: Patient requires skilled physical therapy services for SURGICAL AFTERCARE OF TOTAL KNEE ARTHROPLASTY for management of lower extremity pain/edema and to address deficits in range of motion, strength and gait activities.      PLAN FOR NEXT VISIT:  Progress HEP, strive to increase ROM with attention to extension, progressive gait training

## 2024-11-21 NOTE — CASE COMMUNICATION
Post-hospital reassessment on 11/19/24: Anticipate OT 2w1, 1w2 for ADL, home safety, falls prevention, monitor vital signs, including pulse oximetry, therapeutic exercise, safety in home, and improve endurance and fatigue management with self care, and functional mobility with durable medical equipment as necessary.

## 2024-11-22 ENCOUNTER — HOME CARE VISIT (OUTPATIENT)
Dept: HOME HEALTH SERVICES | Facility: HOME HEALTHCARE | Age: 64
End: 2024-11-22
Payer: MEDICARE

## 2024-11-25 ENCOUNTER — HOME CARE VISIT (OUTPATIENT)
Dept: HOME HEALTH SERVICES | Facility: HOME HEALTHCARE | Age: 64
End: 2024-11-25
Payer: MEDICARE

## 2024-11-25 VITALS — DIASTOLIC BLOOD PRESSURE: 85 MMHG | RESPIRATION RATE: 20 BRPM | TEMPERATURE: 97.4 F | SYSTOLIC BLOOD PRESSURE: 123 MMHG

## 2024-11-25 VITALS
TEMPERATURE: 98.4 F | OXYGEN SATURATION: 94 % | HEART RATE: 117 BPM | RESPIRATION RATE: 18 BRPM | SYSTOLIC BLOOD PRESSURE: 132 MMHG | DIASTOLIC BLOOD PRESSURE: 72 MMHG

## 2024-11-25 PROCEDURE — G0299 HHS/HOSPICE OF RN EA 15 MIN: HCPCS

## 2024-11-25 PROCEDURE — G0152 HHCP-SERV OF OT,EA 15 MIN: HCPCS

## 2024-11-26 ENCOUNTER — HOME CARE VISIT (OUTPATIENT)
Dept: HOME HEALTH SERVICES | Facility: HOME HEALTHCARE | Age: 64
End: 2024-11-26
Payer: MEDICARE

## 2024-11-26 VITALS
OXYGEN SATURATION: 96 % | RESPIRATION RATE: 18 BRPM | SYSTOLIC BLOOD PRESSURE: 98 MMHG | HEART RATE: 108 BPM | DIASTOLIC BLOOD PRESSURE: 56 MMHG | TEMPERATURE: 96.8 F

## 2024-11-26 PROCEDURE — G0151 HHCP-SERV OF PT,EA 15 MIN: HCPCS

## 2024-11-27 NOTE — HOME HEALTH
"_____________________________________________  PHYSICAL THERAPY ROUTINE VISIT NOTE    SUBJECTIVE:  Patient states her knee is feeling \"remarkably well\".  He pain has gone down a lot.  She's been doing her exercises & working on stretching her knee in extension.    MEDICATION CHANGES:  None    FALLS SINCE LAST VISIT:  None    MENTAL STATUS:  Alert & Oriented x 4    EDEMA: Mild/moderate Right knee.  Calf soft & non-tender.  Tierra's negative.        WOUND / SKIN CONDITION:  12.5 cm Right knee incision is healed & open to air.      SKILL/EDUCATION PROVIDED & PATIENT/CAREGIVER RESPONSE: See interventions for details       FOCUS OF CARE:  RIGHT TOTAL KNEE ARTHROPLASTY        MEDICAL NECESSITY FOR ONGOING SKILLED PHYSICAL THERAPY: Patient requires skilled physical therapy services for SURGICAL AFTERCARE OF TOTAL KNEE ARTHROPLASTY for management of lower extremity pain/edema and to address deficits in range of motion, strength and gait activities.        PLAN FOR NEXT VISIT:  Progress HEP, strive to increase ROM with attention to extension, progressive gait training- trial use of rollator."

## 2024-12-02 ENCOUNTER — HOME CARE VISIT (OUTPATIENT)
Dept: HOME HEALTH SERVICES | Facility: HOME HEALTHCARE | Age: 64
End: 2024-12-02
Payer: MEDICARE

## 2024-12-02 VITALS
TEMPERATURE: 97.5 F | DIASTOLIC BLOOD PRESSURE: 64 MMHG | SYSTOLIC BLOOD PRESSURE: 120 MMHG | RESPIRATION RATE: 18 BRPM | OXYGEN SATURATION: 95 % | HEART RATE: 123 BPM

## 2024-12-02 PROCEDURE — G0152 HHCP-SERV OF OT,EA 15 MIN: HCPCS

## 2024-12-02 NOTE — CASE COMMUNICATION
OT DC on 12/2/24, all goals met.  She is independent with personal care, light homemaking, bathroom and kitchen mobility and home is well equipped for safety and accessibility.  She did have fall on 12/1/24, but stated she was wearing socks and feet slid out from under her, so she knows to wear slippers or non-slip socks from now on. PT still active, thank you.

## 2024-12-02 NOTE — HOME HEALTH
Patient agreed to plan to DC due to completion of goals, and was pleased with the progress she made.

## 2024-12-04 ENCOUNTER — HOME CARE VISIT (OUTPATIENT)
Dept: HOME HEALTH SERVICES | Facility: HOME HEALTHCARE | Age: 64
End: 2024-12-04
Payer: MEDICARE

## 2024-12-04 VITALS
HEART RATE: 121 BPM | SYSTOLIC BLOOD PRESSURE: 94 MMHG | RESPIRATION RATE: 18 BRPM | OXYGEN SATURATION: 97 % | DIASTOLIC BLOOD PRESSURE: 56 MMHG

## 2024-12-04 PROCEDURE — G0151 HHCP-SERV OF PT,EA 15 MIN: HCPCS

## 2024-12-04 NOTE — HOME HEALTH
_____________________________________________  PHYSICAL THERAPY ROUTINE VISIT NOTE    SUBJECTIVE:  Patient states she saw the rheumatologist yesterday.  She was told that she might have gout because she's not improving with the Cimzia which was discontinued.    Discussed DC from PT with pt who is in agreement.  PT contacted Dr. Alcala's office & requested he fax outpatient orders to [facility].  Fax number provided.  vui  MEDICATION CHANGES:  DC Cimzia, start colcrys effective 12/4/24.    FALLS SINCE LAST VISIT:  None    MENTAL STATUS:  Alert & Oriented x 4    EDEMA: Mild/moderate Right knee.  Calf soft & non-tender.  Tierra's negative.        WOUND / SKIN CONDITION:  12.5 cm Right knee incision is healed & open to air.      SKILL/EDUCATION PROVIDED & PATIENT/CAREGIVER RESPONSE: See interventions for details       FOCUS OF CARE:  RIGHT TOTAL KNEE ARTHROPLASTY        MEDICAL NECESSITY FOR ONGOING SKILLED PHYSICAL THERAPY: Patient requires skilled physical therapy services for SURGICAL AFTERCARE OF TOTAL KNEE ARTHROPLASTY for management of lower extremity pain/edema and to address deficits in range of motion, strength and gait activities.        PLAN FOR NEXT VISIT:  Finalize HEP & DC

## 2024-12-04 NOTE — PROGRESS NOTES
"REASON FOR FOLLOW UP:    Hypogammaglobulinemia; receiving Gamunex infusions every 2 weeks  2.  Iron deficiency anemia vs anemia of chronic disease.  Her anemia improved significantly with oral iron supplementation.  3.  Aberrant T-cell population with absent CD7  4.  Reactive leukocytosis  5.  Reactive thrombocytosis  6.  Rheumatoid arthritis followed by Dr. Ofelia Tinajero      INTERVAL HISTORY:  Paola Reid is a 64 y.o. female who returns today for follow up.    She had her right knee replaced and is recovering nicely from this.  She did spend 1 week in rehabilitation.  No other issues today.      PE:    Vitals:    12/06/24 1244   BP: 147/81   Pulse: 111   Resp: 16   Temp: 97.6 °F (36.4 °C)   TempSrc: Oral   SpO2: 100%   Weight: 69.5 kg (153 lb 3.2 oz)   Height: 167.6 cm (65.98\")   PainSc: 0-No pain             12/6/2024    12:44 PM   Current Status   ECOG score 0     General:  No acute distress, awake, alert and oriented.  Walks with a rolling walker today due to recent right knee replacement.  Skin:  Warm and dry, no visible rash  HEENT:  Normocephalic/atraumatic.   Chest:  Normal respiratory effort  Extremities:  No visible clubbing, cyanosis, or edema  Neuro/psych:  Grossly nonfocal.  Normal mood and affect.         RECENT LABS:  Basic Metabolic Panel (12/06/2024 13:21)  IgG, IgA, IgM (12/06/2024 13:21)  CBC and Differential (12/06/2024 13:21)    IMAGING:  None reviewed      ASSESSMENT/PLAN:    *Hypogammaglobulinemia with recurring pulmonary infections.  She is doing much better since starting on Gamunex infusions every 2 weeks.  Her allergist is retiring and she has asked us to take over the infusions.  Her IgG level improved and Dr. Dominguez modified her IVIG dosing to every 3 weeks.  She is currently receiving 10 g every 3 weeks.  Proceed with this today.    *Anemia with somewhat ambiguous iron studies.    Hemoglobin improving at 11.1, from 10.3, from 11.5    *Aberrant population of CD7 negative T cells " noted on T and B cell analysis.  There is no evidence of monoclonal B-cell population.  We feel this is a reactive T-cell population and not indicative of an underlying T-cell leukemia or lymphoma.    *Positive TONG and elevated markers of inflammation.  Patient has been evaluated by rheumatology and will be following up with Dr. Ofelia Tinajero.  Currently on Cimzia    *Easy bruising, unclear etiology, monitor    *Recent right knee replacement on 10/16/2024    *Hyponatremia  Sodium 131, from 133, from 134  Perhaps related to furosemide    *Hypokalemia  Potassium mildly low at 3.4, from 3.3  Perhaps related to furosemide    PLAN  Proceed with IVIG today, continued every 3 weeks   Continue to follow closely with her rheumatologist, Dr. Tinajero.  Schedule treatment every 3 weeks.  APRN visit in a couple of months and I will see her back a couple of months after that.

## 2024-12-05 ENCOUNTER — HOME CARE VISIT (OUTPATIENT)
Dept: HOME HEALTH SERVICES | Facility: HOME HEALTHCARE | Age: 64
End: 2024-12-05
Payer: MEDICARE

## 2024-12-05 VITALS
TEMPERATURE: 97.2 F | RESPIRATION RATE: 18 BRPM | DIASTOLIC BLOOD PRESSURE: 58 MMHG | SYSTOLIC BLOOD PRESSURE: 104 MMHG | HEART RATE: 115 BPM | OXYGEN SATURATION: 99 %

## 2024-12-05 PROCEDURE — G0151 HHCP-SERV OF PT,EA 15 MIN: HCPCS

## 2024-12-05 NOTE — Clinical Note
Sasha-  Please fax to pts PCP- Dr. Akil Mcnally & forward to her orthopedist- Dr. Ronak Bianchi with [facility] orthopedics.   Thanks      Greetings,    I wanted to inform you that Paola Reid ( 56) has been discharged from [facility] services effective 24.    REASON FOR HOMECARE SERVICES:  SN, PT & OT following hospitalization & rehab for (R) TKA    CURRENT LEVEL OF FUNCTION:  Patient is independent in self care, transfers & ambulation with a cane & rolling walker over all surface levels, including her ramp.    PROGRESS TOWARD GOALS:  Good    BARRIERS PREVENTING GOAL ACHIEVEMENT:  None    LIVING ARRANGEMENTS:  Lives with 3 poorly behaved dogs in single story home with ramp entrance.    CONCERNS:  Dogs jump on patient & pose as a fall risk.    FOLLOW UP APPOINTMENTS/PLANS:  [facility],  Orthopedist on 24    Please feel free to contact me if you have any questions.  Zina Small, PT        [phone]

## 2024-12-05 NOTE — HOME HEALTH
_____________________________________________  PHYSICAL THERAPY DISCHARGE VISIT NOTE    SUBJECTIVE:  Patient states her (R) knee was sore last night after PT session.      MEDICATION CHANGES:  None    FALLS SINCE LAST VISIT:  None    MENTAL STATUS:  Alert & Oriented x 4    EDEMA: Mild Right knee.  Calf soft & non-tender.  Tierra's negative.        WOUND / SKIN CONDITION:  12.5 cm Right knee incision is healed & open to air.      SIGNS SYMPTOMS OF INFECTION:   None     CURRENT LEVEL OF FUNCTION:  Patient is independent in self care, transfers & ambulation with a cane & rolling walker over all surface levels, including her ramp.    PROGRESS TOWARD GOALS:  Good    BARRIERS PREVENTING GOAL ACHIEVEMENT:  None    DISCHARGE STATUS:  Home to self & outpatient PT at [facility]    DISCHARGE CONDITION:  Good    INSTRUCTIONS HOME PROGRAM PROVIDED:  Yes          CONTENT: Written/pictoral Home exercise program          PATIENT/CAREGIVER LEVEL OF COMPLIANCE:  Good    HOME HEALTH SERVICES CONTINUING:  None

## 2024-12-06 ENCOUNTER — INFUSION (OUTPATIENT)
Dept: ONCOLOGY | Facility: HOSPITAL | Age: 64
End: 2024-12-06
Payer: MEDICARE

## 2024-12-06 ENCOUNTER — OFFICE VISIT (OUTPATIENT)
Dept: ONCOLOGY | Facility: CLINIC | Age: 64
End: 2024-12-06
Payer: MEDICARE

## 2024-12-06 VITALS
BODY MASS INDEX: 24.62 KG/M2 | DIASTOLIC BLOOD PRESSURE: 81 MMHG | WEIGHT: 153.2 LBS | OXYGEN SATURATION: 100 % | SYSTOLIC BLOOD PRESSURE: 147 MMHG | HEART RATE: 111 BPM | RESPIRATION RATE: 16 BRPM | TEMPERATURE: 97.6 F | HEIGHT: 66 IN

## 2024-12-06 VITALS — SYSTOLIC BLOOD PRESSURE: 135 MMHG | HEART RATE: 101 BPM | DIASTOLIC BLOOD PRESSURE: 83 MMHG

## 2024-12-06 DIAGNOSIS — Z45.2 ENCOUNTER FOR ADJUSTMENT OR MANAGEMENT OF VASCULAR ACCESS DEVICE: ICD-10-CM

## 2024-12-06 DIAGNOSIS — D80.1 HYPOGAMMAGLOBULINEMIA: ICD-10-CM

## 2024-12-06 DIAGNOSIS — I87.8 POOR VENOUS ACCESS: ICD-10-CM

## 2024-12-06 DIAGNOSIS — D80.1 HYPOGAMMAGLOBULINEMIA: Primary | ICD-10-CM

## 2024-12-06 LAB
ANION GAP SERPL CALCULATED.3IONS-SCNC: 10.7 MMOL/L (ref 5–15)
BASOPHILS # BLD AUTO: 0.12 10*3/MM3 (ref 0–0.2)
BASOPHILS NFR BLD AUTO: 1.3 % (ref 0–1.5)
BUN SERPL-MCNC: 35 MG/DL (ref 8–23)
BUN/CREAT SERPL: 38.9 (ref 7–25)
CALCIUM SPEC-SCNC: 9.1 MG/DL (ref 8.6–10.5)
CHLORIDE SERPL-SCNC: 88 MMOL/L (ref 98–107)
CO2 SERPL-SCNC: 32.3 MMOL/L (ref 22–29)
CREAT SERPL-MCNC: 0.9 MG/DL (ref 0.57–1)
DEPRECATED RDW RBC AUTO: 41.8 FL (ref 37–54)
EGFRCR SERPLBLD CKD-EPI 2021: 70.7 ML/MIN/1.73
EOSINOPHIL # BLD AUTO: 0.27 10*3/MM3 (ref 0–0.4)
EOSINOPHIL NFR BLD AUTO: 2.9 % (ref 0.3–6.2)
ERYTHROCYTE [DISTWIDTH] IN BLOOD BY AUTOMATED COUNT: 13.2 % (ref 12.3–15.4)
GLUCOSE SERPL-MCNC: 135 MG/DL (ref 65–99)
HCT VFR BLD AUTO: 32.3 % (ref 34–46.6)
HGB BLD-MCNC: 11.1 G/DL (ref 12–15.9)
IGA1 MFR SER: 313 MG/DL (ref 70–400)
IGG1 SER-MCNC: 971 MG/DL (ref 700–1600)
IGM SERPL-MCNC: 60 MG/DL (ref 40–230)
IMM GRANULOCYTES # BLD AUTO: 0.04 10*3/MM3 (ref 0–0.05)
IMM GRANULOCYTES NFR BLD AUTO: 0.4 % (ref 0–0.5)
LYMPHOCYTES # BLD AUTO: 2.81 10*3/MM3 (ref 0.7–3.1)
LYMPHOCYTES NFR BLD AUTO: 30.1 % (ref 19.6–45.3)
MCH RBC QN AUTO: 30 PG (ref 26.6–33)
MCHC RBC AUTO-ENTMCNC: 34.4 G/DL (ref 31.5–35.7)
MCV RBC AUTO: 87.3 FL (ref 79–97)
MONOCYTES # BLD AUTO: 0.54 10*3/MM3 (ref 0.1–0.9)
MONOCYTES NFR BLD AUTO: 5.8 % (ref 5–12)
NEUTROPHILS NFR BLD AUTO: 5.57 10*3/MM3 (ref 1.7–7)
NEUTROPHILS NFR BLD AUTO: 59.5 % (ref 42.7–76)
NRBC BLD AUTO-RTO: 0 /100 WBC (ref 0–0.2)
PLATELET # BLD AUTO: 591 10*3/MM3 (ref 140–450)
PMV BLD AUTO: 9.8 FL (ref 6–12)
POTASSIUM SERPL-SCNC: 3.4 MMOL/L (ref 3.5–5.2)
RBC # BLD AUTO: 3.7 10*6/MM3 (ref 3.77–5.28)
SODIUM SERPL-SCNC: 131 MMOL/L (ref 136–145)
WBC NRBC COR # BLD AUTO: 9.35 10*3/MM3 (ref 3.4–10.8)

## 2024-12-06 PROCEDURE — 96365 THER/PROPH/DIAG IV INF INIT: CPT

## 2024-12-06 PROCEDURE — 82784 ASSAY IGA/IGD/IGG/IGM EACH: CPT | Performed by: INTERNAL MEDICINE

## 2024-12-06 PROCEDURE — A9270 NON-COVERED ITEM OR SERVICE: HCPCS | Performed by: INTERNAL MEDICINE

## 2024-12-06 PROCEDURE — 80048 BASIC METABOLIC PNL TOTAL CA: CPT | Performed by: INTERNAL MEDICINE

## 2024-12-06 PROCEDURE — 25010000002 HEPARIN LOCK FLUSH PER 10 UNITS: Performed by: INTERNAL MEDICINE

## 2024-12-06 PROCEDURE — 63710000001 DIPHENHYDRAMINE PER 50 MG: Performed by: INTERNAL MEDICINE

## 2024-12-06 PROCEDURE — 36415 COLL VENOUS BLD VENIPUNCTURE: CPT | Performed by: INTERNAL MEDICINE

## 2024-12-06 PROCEDURE — 63710000001 ACETAMINOPHEN 325 MG TABLET: Performed by: INTERNAL MEDICINE

## 2024-12-06 PROCEDURE — 85025 COMPLETE CBC W/AUTO DIFF WBC: CPT | Performed by: INTERNAL MEDICINE

## 2024-12-06 PROCEDURE — 25010000002 IMMUNE GLOBULIN (HUMAN) 10 GM/100ML SOLUTION: Performed by: INTERNAL MEDICINE

## 2024-12-06 RX ORDER — DIPHENHYDRAMINE HCL 25 MG
25 CAPSULE ORAL ONCE
Status: COMPLETED | OUTPATIENT
Start: 2024-12-06 | End: 2024-12-06

## 2024-12-06 RX ORDER — HYDROCORTISONE SODIUM SUCCINATE 100 MG/2ML
100 INJECTION INTRAMUSCULAR; INTRAVENOUS AS NEEDED
Status: CANCELLED | OUTPATIENT
Start: 2024-12-06

## 2024-12-06 RX ORDER — SODIUM CHLORIDE 0.9 % (FLUSH) 0.9 %
10 SYRINGE (ML) INJECTION AS NEEDED
OUTPATIENT
Start: 2024-12-06

## 2024-12-06 RX ORDER — FAMOTIDINE 10 MG/ML
20 INJECTION, SOLUTION INTRAVENOUS AS NEEDED
Status: CANCELLED | OUTPATIENT
Start: 2024-12-06

## 2024-12-06 RX ORDER — ACETAMINOPHEN 325 MG/1
650 TABLET ORAL ONCE
Status: COMPLETED | OUTPATIENT
Start: 2024-12-06 | End: 2024-12-06

## 2024-12-06 RX ORDER — SODIUM CHLORIDE 0.9 % (FLUSH) 0.9 %
10 SYRINGE (ML) INJECTION AS NEEDED
Status: DISCONTINUED | OUTPATIENT
Start: 2024-12-06 | End: 2024-12-06 | Stop reason: HOSPADM

## 2024-12-06 RX ORDER — HEPARIN SODIUM (PORCINE) LOCK FLUSH IV SOLN 100 UNIT/ML 100 UNIT/ML
500 SOLUTION INTRAVENOUS AS NEEDED
Status: DISCONTINUED | OUTPATIENT
Start: 2024-12-06 | End: 2024-12-06 | Stop reason: HOSPADM

## 2024-12-06 RX ORDER — DIPHENHYDRAMINE HYDROCHLORIDE 50 MG/ML
50 INJECTION INTRAMUSCULAR; INTRAVENOUS AS NEEDED
Status: CANCELLED | OUTPATIENT
Start: 2024-12-06

## 2024-12-06 RX ORDER — HEPARIN SODIUM (PORCINE) LOCK FLUSH IV SOLN 100 UNIT/ML 100 UNIT/ML
500 SOLUTION INTRAVENOUS AS NEEDED
OUTPATIENT
Start: 2024-12-06

## 2024-12-06 RX ORDER — SODIUM CHLORIDE 9 MG/ML
250 INJECTION, SOLUTION INTRAVENOUS ONCE
Status: CANCELLED | OUTPATIENT
Start: 2024-12-06

## 2024-12-06 RX ADMIN — ACETAMINOPHEN 650 MG: 325 TABLET ORAL at 13:27

## 2024-12-06 RX ADMIN — DIPHENHYDRAMINE HYDROCHLORIDE 25 MG: 25 CAPSULE ORAL at 13:27

## 2024-12-06 RX ADMIN — Medication 500 UNITS: at 15:08

## 2024-12-06 RX ADMIN — Medication 10 ML: at 15:08

## 2024-12-06 RX ADMIN — IMMUNE GLOBULIN (HUMAN) 10 G: 10 INJECTION INTRAVENOUS; SUBCUTANEOUS at 13:43

## 2024-12-06 NOTE — PROGRESS NOTES
Not sure if she got her BMP result while she was here today or not. Her sodium is lower and her potassium is a little low. Not sure who manages her Lasix, but that could be contributing. Please let her know, and have her follow up with whomever manages her diuretics. Thanks!

## 2024-12-09 ENCOUNTER — TELEPHONE (OUTPATIENT)
Dept: ONCOLOGY | Facility: CLINIC | Age: 64
End: 2024-12-09
Payer: MEDICARE

## 2024-12-09 NOTE — TELEPHONE ENCOUNTER
Called patient and relayed message. Pt v/u. Sent labs to Dr. Mcnally who manages her lasix. Pt states she will reach out.  Tigist Wilder RN

## 2024-12-09 NOTE — TELEPHONE ENCOUNTER
----- Message from Mk Carter sent at 12/6/2024  5:55 PM EST -----  Not sure if she got her BMP result while she was here today or not. Her sodium is lower and her potassium is a little low. Not sure who manages her Lasix, but that could be contributing. Please let her know, and have her follow up with whomever manag  es her diuretics. Thanks!

## 2024-12-27 ENCOUNTER — INFUSION (OUTPATIENT)
Dept: ONCOLOGY | Facility: HOSPITAL | Age: 64
End: 2024-12-27
Payer: MEDICARE

## 2024-12-27 VITALS
BODY MASS INDEX: 24.98 KG/M2 | HEIGHT: 66 IN | SYSTOLIC BLOOD PRESSURE: 70 MMHG | TEMPERATURE: 98.3 F | RESPIRATION RATE: 15 BRPM | DIASTOLIC BLOOD PRESSURE: 39 MMHG | OXYGEN SATURATION: 97 % | HEART RATE: 109 BPM | WEIGHT: 155.4 LBS

## 2024-12-27 DIAGNOSIS — Z45.2 ENCOUNTER FOR ADJUSTMENT OR MANAGEMENT OF VASCULAR ACCESS DEVICE: ICD-10-CM

## 2024-12-27 DIAGNOSIS — D80.1 HYPOGAMMAGLOBULINEMIA: Primary | ICD-10-CM

## 2024-12-27 DIAGNOSIS — I87.8 POOR VENOUS ACCESS: ICD-10-CM

## 2024-12-27 LAB
ALBUMIN SERPL-MCNC: 4.3 G/DL (ref 3.5–5.2)
ALBUMIN/GLOB SERPL: 1.6 G/DL
ALP SERPL-CCNC: 110 U/L (ref 39–117)
ALT SERPL W P-5'-P-CCNC: 18 U/L (ref 1–33)
ANION GAP SERPL CALCULATED.3IONS-SCNC: 10.2 MMOL/L (ref 5–15)
AST SERPL-CCNC: 19 U/L (ref 1–32)
BASOPHILS # BLD AUTO: 0.09 10*3/MM3 (ref 0–0.2)
BASOPHILS NFR BLD AUTO: 1 % (ref 0–1.5)
BILIRUB SERPL-MCNC: 0.2 MG/DL (ref 0–1.2)
BUN SERPL-MCNC: 17 MG/DL (ref 8–23)
BUN/CREAT SERPL: 22.7 (ref 7–25)
CALCIUM SPEC-SCNC: 9.1 MG/DL (ref 8.6–10.5)
CHLORIDE SERPL-SCNC: 94 MMOL/L (ref 98–107)
CO2 SERPL-SCNC: 31.8 MMOL/L (ref 22–29)
CREAT SERPL-MCNC: 0.75 MG/DL (ref 0.57–1)
DEPRECATED RDW RBC AUTO: 42.9 FL (ref 37–54)
EGFRCR SERPLBLD CKD-EPI 2021: 87.9 ML/MIN/1.73
EOSINOPHIL # BLD AUTO: 0.29 10*3/MM3 (ref 0–0.4)
EOSINOPHIL NFR BLD AUTO: 3.2 % (ref 0.3–6.2)
ERYTHROCYTE [DISTWIDTH] IN BLOOD BY AUTOMATED COUNT: 13.1 % (ref 12.3–15.4)
GLOBULIN UR ELPH-MCNC: 2.7 GM/DL
GLUCOSE SERPL-MCNC: 111 MG/DL (ref 65–99)
HCT VFR BLD AUTO: 32.5 % (ref 34–46.6)
HGB BLD-MCNC: 11.1 G/DL (ref 12–15.9)
IGA1 MFR SER: 289 MG/DL (ref 70–400)
IGG1 SER-MCNC: 964 MG/DL (ref 700–1600)
IGM SERPL-MCNC: 60 MG/DL (ref 40–230)
IMM GRANULOCYTES # BLD AUTO: 0.02 10*3/MM3 (ref 0–0.05)
IMM GRANULOCYTES NFR BLD AUTO: 0.2 % (ref 0–0.5)
LYMPHOCYTES # BLD AUTO: 2.62 10*3/MM3 (ref 0.7–3.1)
LYMPHOCYTES NFR BLD AUTO: 29 % (ref 19.6–45.3)
MCH RBC QN AUTO: 30.5 PG (ref 26.6–33)
MCHC RBC AUTO-ENTMCNC: 34.2 G/DL (ref 31.5–35.7)
MCV RBC AUTO: 89.3 FL (ref 79–97)
MONOCYTES # BLD AUTO: 0.49 10*3/MM3 (ref 0.1–0.9)
MONOCYTES NFR BLD AUTO: 5.4 % (ref 5–12)
NEUTROPHILS NFR BLD AUTO: 5.51 10*3/MM3 (ref 1.7–7)
NEUTROPHILS NFR BLD AUTO: 61.2 % (ref 42.7–76)
NRBC BLD AUTO-RTO: 0 /100 WBC (ref 0–0.2)
PLATELET # BLD AUTO: 389 10*3/MM3 (ref 140–450)
PMV BLD AUTO: 9.1 FL (ref 6–12)
POTASSIUM SERPL-SCNC: 3.5 MMOL/L (ref 3.5–5.2)
PROT SERPL-MCNC: 7 G/DL (ref 6–8.5)
RBC # BLD AUTO: 3.64 10*6/MM3 (ref 3.77–5.28)
SODIUM SERPL-SCNC: 136 MMOL/L (ref 136–145)
WBC NRBC COR # BLD AUTO: 9.02 10*3/MM3 (ref 3.4–10.8)

## 2024-12-27 PROCEDURE — 85025 COMPLETE CBC W/AUTO DIFF WBC: CPT | Performed by: INTERNAL MEDICINE

## 2024-12-27 PROCEDURE — A9270 NON-COVERED ITEM OR SERVICE: HCPCS | Performed by: INTERNAL MEDICINE

## 2024-12-27 PROCEDURE — 63710000001 DIPHENHYDRAMINE PER 50 MG: Performed by: INTERNAL MEDICINE

## 2024-12-27 PROCEDURE — 63710000001 ACETAMINOPHEN 325 MG TABLET: Performed by: INTERNAL MEDICINE

## 2024-12-27 PROCEDURE — 25010000002 IMMUNE GLOBULIN (HUMAN) 10 GM/100ML SOLUTION: Performed by: INTERNAL MEDICINE

## 2024-12-27 PROCEDURE — 25810000003 SODIUM CHLORIDE 0.9 % SOLUTION: Performed by: INTERNAL MEDICINE

## 2024-12-27 PROCEDURE — 96361 HYDRATE IV INFUSION ADD-ON: CPT

## 2024-12-27 PROCEDURE — 96365 THER/PROPH/DIAG IV INF INIT: CPT

## 2024-12-27 PROCEDURE — 82784 ASSAY IGA/IGD/IGG/IGM EACH: CPT | Performed by: INTERNAL MEDICINE

## 2024-12-27 PROCEDURE — 80053 COMPREHEN METABOLIC PANEL: CPT

## 2024-12-27 RX ORDER — ACETAMINOPHEN 325 MG/1
650 TABLET ORAL ONCE
Status: COMPLETED | OUTPATIENT
Start: 2024-12-27 | End: 2024-12-27

## 2024-12-27 RX ORDER — HEPARIN SODIUM (PORCINE) LOCK FLUSH IV SOLN 100 UNIT/ML 100 UNIT/ML
500 SOLUTION INTRAVENOUS AS NEEDED
OUTPATIENT
Start: 2024-12-27

## 2024-12-27 RX ORDER — SODIUM CHLORIDE 9 MG/ML
500 INJECTION, SOLUTION INTRAVENOUS ONCE
Status: COMPLETED | OUTPATIENT
Start: 2024-12-27 | End: 2024-12-27

## 2024-12-27 RX ORDER — SODIUM CHLORIDE 0.9 % (FLUSH) 0.9 %
10 SYRINGE (ML) INJECTION AS NEEDED
OUTPATIENT
Start: 2024-12-27

## 2024-12-27 RX ORDER — SODIUM CHLORIDE 0.9 % (FLUSH) 0.9 %
10 SYRINGE (ML) INJECTION AS NEEDED
Status: DISCONTINUED | OUTPATIENT
Start: 2024-12-27 | End: 2024-12-27 | Stop reason: HOSPADM

## 2024-12-27 RX ORDER — DIPHENHYDRAMINE HCL 25 MG
25 CAPSULE ORAL ONCE
Status: COMPLETED | OUTPATIENT
Start: 2024-12-27 | End: 2024-12-27

## 2024-12-27 RX ORDER — HEPARIN SODIUM (PORCINE) LOCK FLUSH IV SOLN 100 UNIT/ML 100 UNIT/ML
500 SOLUTION INTRAVENOUS AS NEEDED
Status: DISCONTINUED | OUTPATIENT
Start: 2024-12-27 | End: 2024-12-27 | Stop reason: HOSPADM

## 2024-12-27 RX ADMIN — SODIUM CHLORIDE 500 ML: 9 INJECTION, SOLUTION INTRAVENOUS at 14:40

## 2024-12-27 RX ADMIN — ACETAMINOPHEN 650 MG: 325 TABLET ORAL at 14:00

## 2024-12-27 RX ADMIN — DIPHENHYDRAMINE HYDROCHLORIDE 25 MG: 25 CAPSULE ORAL at 14:00

## 2024-12-27 RX ADMIN — IMMUNE GLOBULIN (HUMAN) 10 G: 10 INJECTION INTRAVENOUS; SUBCUTANEOUS at 14:05

## 2024-12-27 NOTE — NURSING NOTE
1439 - PT'S BP VERY LOW. PT STATES THAT SHE FEELS VERY TIRED AND IS HAVING DOUBLE VISION. IVIG STOPPED. NS BOLUS ADMINISTERED. DR. MONDRAGON NOTIFIED. ORDER REC'D FOR MORE NS BOLUS.     BP READINGS: 70/40, 78/44, 85/49, 80/49, 78/50     BP STILL LOW AFTER NS BOLUS. PT STILL C/O DOUBLE VISION AND PT APPEARS VERY SEDATED AND TIRED. PT HAVING TROUBLE KEEPING EYES OPEN.     DR. MONDRAGON SAW PT AT CHAIRSIDE. ORDERS REC'D TO SEND PT TO ER FOR CHECKUP.     PT DC'D IN STABLE CONDITION TO EMS STAFF.     PT SAID SHE WILL CALL US TO RESCHEDULE LATER.

## 2024-12-27 NOTE — PROGRESS NOTES
Patient has rheumatoid arthritis which has been bothering her a little bit more.  She has chronic hypogammaglobulinemia.  She is immunocompromised.    Patient presented today for IVIG.  She has been feeling well at home.  She states that she took her blood pressure medication, pain medication as per her usual routine.  Her blood pressure was normal when she came to the office.  She received acetaminophen and Benadryl and initiated IVIG at a slow rate.  Her blood pressure abruptly dropped.  She received 500 mL of normal saline with minimal improvement.  She is afebrile.    Lungs are clear to auscultation bilaterally.  Heart is regular.  She appears mildly ill and mildly drowsy.  Her right knee incision is without erythema.    Discussed with nursing staff and the patient.  The etiology of the hypotension is unclear.  She will be transported to the emergency department as a precaution for sepsis evaluation.

## 2024-12-30 ENCOUNTER — TELEPHONE (OUTPATIENT)
Dept: ONCOLOGY | Facility: CLINIC | Age: 64
End: 2024-12-30
Payer: MEDICARE

## 2024-12-30 NOTE — TELEPHONE ENCOUNTER
Returned call to pt. States her rheumatologist told her to let us know about her recent labs during her [place] admission showed anemia. Pt was admitted for at [place] for hypotension after dropping her blood pressure during her IVIG infusion here in our office last week. She received 2 fluid boluses in our office prior to transport to ER. Hbg in our office was 11.1. During hospitalization was 9.6 and discharge summary indicated f/u with PCP and recheck of labs this week. Pt states rheumatologist has her coming in tomorrow for labs and she would like us to watch for these as she is concerned about her anemia. She states she is taking her oral iron as directed . Let pt know we would watch for her lab results which are being done at [facility] tomorrow and if anemia persists, will speak with MD #2 or EVELINE in Dr Carter's absence this week for potential need for sooner follow up in our office. Pt v/u.

## 2025-01-03 NOTE — TELEPHONE ENCOUNTER
Patient returned call and states her rheumatologist did recheck her labs on 12/31 and her hgb/hct were back up. [facility](medical records) is reaching out to have labs faxed to us. No other needs identified at this time. Tigist Wilder RN

## 2025-01-03 NOTE — TELEPHONE ENCOUNTER
Called patient and left a voicemail requesting a call back at my direct line: [phone]. I do not see any repeat CBC drawn on care everywhere since this previous call took place even after updating care everywhere. Tigist Wilder RN

## 2025-01-22 RX ORDER — FERROUS GLUCONATE 324(38)MG
324 TABLET ORAL DAILY
Qty: 30 TABLET | Refills: 2 | OUTPATIENT
Start: 2025-01-22

## 2025-01-30 NOTE — PROGRESS NOTES
"REASON FOR FOLLOW UP:    Hypogammaglobulinemia; receiving Gamunex infusions every 2 weeks  2.  Iron deficiency anemia vs anemia of chronic disease.  Her anemia improved significantly with oral iron supplementation.  3.  Aberrant T-cell population with absent CD7  4.  Reactive leukocytosis  5.  Reactive thrombocytosis  6.  Rheumatoid arthritis followed by Dr. Ofelia Tinajero      INTERVAL HISTORY:  Paola Reid is a 64 y.o. female who returns today for follow up.    Arthritis pain remains an issue.  Some days this is worse than others.    She is upset about some recent changes that were made to her medication.  She did recently have mild acute kidney injury and hyperkalemia.      PE:    Vitals:    01/31/25 1117   BP: 110/69   Pulse: 97   Resp: 16   Temp: 96.9 °F (36.1 °C)   SpO2: 99%   Weight: 71.6 kg (157 lb 14.4 oz)   Height: 167.6 cm (65.98\")           1/31/2025    11:21 AM   Current Status   ECOG score 1     General:  No acute distress, awake, alert and oriented.  Walks with a cane today. Kyphosis present.   Skin:  Warm and dry, no visible rash  HEENT:  Normocephalic/atraumatic.   Chest:  Normal respiratory effort  Extremities:  No visible clubbing, cyanosis, or edema  Neuro/psych:  Grossly nonfocal.  Normal mood and affect.      RECENT LABS:  CBC and Differential (01/31/2025 11:20)   Comprehensive Metabolic Panel (01/31/2025 11:20)     IMAGING:  None reviewed      ASSESSMENT/PLAN:    *Hypogammaglobulinemia with recurring pulmonary infections.  She is doing much better since starting on Gamunex infusions every 2 weeks.  Her allergist is retiring and she has asked us to take over the infusions.  Her IgG level improved and Dr. Dominguez modified her IVIG dosing to every 3 weeks.  She is currently receiving 10 g every 3 weeks.  Proceed with this today.    *Anemia with somewhat ambiguous iron studies.    Hemoglobin stable at 10.7    *Aberrant population of CD7 negative T cells noted on T and B cell analysis.  There is " no evidence of monoclonal B-cell population.  We feel this is a reactive T-cell population and not indicative of an underlying T-cell leukemia or lymphoma.    *Positive TONG and elevated markers of inflammation.  Patient has been evaluated by rheumatology and will be following up with Dr. Ofelia Tinajero.  Currently on Cimzia    *Easy bruising, unclear etiology, monitor    *Recent right knee replacement on 10/16/2024    *Hyponatremia  Sodium 138    *Hypokalemia  Recent hyperkalemia at 5.7 associated with acute kidney injury with an elevation in her creatinine to 1.3  She has stopped her oral potassium supplement  Potassium is 4.1    PLAN  Proceed with IVIG 10 gm today, continued every 3 weeks   Continue to follow closely with her rheumatologist, Dr. Tinajero.  Schedule treatment every 3 weeks.    APRN visit in a couple of months.  I will see her back in June.    Patient is receiving IVIG, tolerating it without difficulty and continuing to receive benefit.  Continue IVIG at current dosage and schedule.

## 2025-01-31 ENCOUNTER — OFFICE VISIT (OUTPATIENT)
Dept: ONCOLOGY | Facility: CLINIC | Age: 65
End: 2025-01-31
Payer: MEDICARE

## 2025-01-31 ENCOUNTER — INFUSION (OUTPATIENT)
Dept: ONCOLOGY | Facility: HOSPITAL | Age: 65
End: 2025-01-31
Payer: MEDICARE

## 2025-01-31 VITALS — HEART RATE: 63 BPM | DIASTOLIC BLOOD PRESSURE: 63 MMHG | RESPIRATION RATE: 16 BRPM | SYSTOLIC BLOOD PRESSURE: 98 MMHG

## 2025-01-31 VITALS
DIASTOLIC BLOOD PRESSURE: 69 MMHG | OXYGEN SATURATION: 99 % | HEIGHT: 66 IN | WEIGHT: 157.9 LBS | BODY MASS INDEX: 25.38 KG/M2 | TEMPERATURE: 96.9 F | RESPIRATION RATE: 16 BRPM | SYSTOLIC BLOOD PRESSURE: 110 MMHG | HEART RATE: 97 BPM

## 2025-01-31 DIAGNOSIS — I87.8 POOR VENOUS ACCESS: ICD-10-CM

## 2025-01-31 DIAGNOSIS — Z45.2 ENCOUNTER FOR ADJUSTMENT OR MANAGEMENT OF VASCULAR ACCESS DEVICE: Primary | ICD-10-CM

## 2025-01-31 DIAGNOSIS — D80.1 HYPOGAMMAGLOBULINEMIA: ICD-10-CM

## 2025-01-31 DIAGNOSIS — M06.9 RHEUMATOID ARTHRITIS, INVOLVING UNSPECIFIED SITE, UNSPECIFIED WHETHER RHEUMATOID FACTOR PRESENT: Primary | ICD-10-CM

## 2025-01-31 LAB
ALBUMIN SERPL-MCNC: 3.8 G/DL (ref 3.5–5.2)
ALBUMIN/GLOB SERPL: 1.3 G/DL
ALP SERPL-CCNC: 89 U/L (ref 39–117)
ALT SERPL W P-5'-P-CCNC: 14 U/L (ref 1–33)
ANION GAP SERPL CALCULATED.3IONS-SCNC: 10.3 MMOL/L (ref 5–15)
AST SERPL-CCNC: 17 U/L (ref 1–32)
BASOPHILS # BLD AUTO: 0.06 10*3/MM3 (ref 0–0.2)
BASOPHILS NFR BLD AUTO: 0.7 % (ref 0–1.5)
BILIRUB SERPL-MCNC: 0.2 MG/DL (ref 0–1.2)
BUN SERPL-MCNC: 15 MG/DL (ref 8–23)
BUN/CREAT SERPL: 22.4 (ref 7–25)
CALCIUM SPEC-SCNC: 8.7 MG/DL (ref 8.6–10.5)
CHLORIDE SERPL-SCNC: 99 MMOL/L (ref 98–107)
CO2 SERPL-SCNC: 28.7 MMOL/L (ref 22–29)
CREAT SERPL-MCNC: 0.67 MG/DL (ref 0.57–1)
DEPRECATED RDW RBC AUTO: 42.3 FL (ref 37–54)
EGFRCR SERPLBLD CKD-EPI 2021: 96.5 ML/MIN/1.73
EOSINOPHIL # BLD AUTO: 0.3 10*3/MM3 (ref 0–0.4)
EOSINOPHIL NFR BLD AUTO: 3.3 % (ref 0.3–6.2)
ERYTHROCYTE [DISTWIDTH] IN BLOOD BY AUTOMATED COUNT: 12.7 % (ref 12.3–15.4)
GLOBULIN UR ELPH-MCNC: 3 GM/DL
GLUCOSE SERPL-MCNC: 118 MG/DL (ref 65–99)
HCT VFR BLD AUTO: 31.7 % (ref 34–46.6)
HGB BLD-MCNC: 10.7 G/DL (ref 12–15.9)
IGA1 MFR SER: 264 MG/DL (ref 70–400)
IGG1 SER-MCNC: 760 MG/DL (ref 700–1600)
IGM SERPL-MCNC: 60 MG/DL (ref 40–230)
IMM GRANULOCYTES # BLD AUTO: 0.04 10*3/MM3 (ref 0–0.05)
IMM GRANULOCYTES NFR BLD AUTO: 0.4 % (ref 0–0.5)
LYMPHOCYTES # BLD AUTO: 3.61 10*3/MM3 (ref 0.7–3.1)
LYMPHOCYTES NFR BLD AUTO: 39.6 % (ref 19.6–45.3)
MCH RBC QN AUTO: 30.8 PG (ref 26.6–33)
MCHC RBC AUTO-ENTMCNC: 33.8 G/DL (ref 31.5–35.7)
MCV RBC AUTO: 91.4 FL (ref 79–97)
MONOCYTES # BLD AUTO: 0.59 10*3/MM3 (ref 0.1–0.9)
MONOCYTES NFR BLD AUTO: 6.5 % (ref 5–12)
NEUTROPHILS NFR BLD AUTO: 4.52 10*3/MM3 (ref 1.7–7)
NEUTROPHILS NFR BLD AUTO: 49.5 % (ref 42.7–76)
NRBC BLD AUTO-RTO: 0 /100 WBC (ref 0–0.2)
PLATELET # BLD AUTO: 407 10*3/MM3 (ref 140–450)
PMV BLD AUTO: 9.1 FL (ref 6–12)
POTASSIUM SERPL-SCNC: 4.1 MMOL/L (ref 3.5–5.2)
PROT SERPL-MCNC: 6.8 G/DL (ref 6–8.5)
RBC # BLD AUTO: 3.47 10*6/MM3 (ref 3.77–5.28)
SODIUM SERPL-SCNC: 138 MMOL/L (ref 136–145)
WBC NRBC COR # BLD AUTO: 9.12 10*3/MM3 (ref 3.4–10.8)

## 2025-01-31 PROCEDURE — 25010000002 IMMUNE GLOBULIN (HUMAN) 10 GM/100ML SOLUTION: Performed by: INTERNAL MEDICINE

## 2025-01-31 PROCEDURE — 80053 COMPREHEN METABOLIC PANEL: CPT | Performed by: INTERNAL MEDICINE

## 2025-01-31 PROCEDURE — 96365 THER/PROPH/DIAG IV INF INIT: CPT

## 2025-01-31 PROCEDURE — A9270 NON-COVERED ITEM OR SERVICE: HCPCS | Performed by: INTERNAL MEDICINE

## 2025-01-31 PROCEDURE — 63710000001 ACETAMINOPHEN 325 MG TABLET: Performed by: INTERNAL MEDICINE

## 2025-01-31 PROCEDURE — 82784 ASSAY IGA/IGD/IGG/IGM EACH: CPT | Performed by: INTERNAL MEDICINE

## 2025-01-31 PROCEDURE — 63710000001 DIPHENHYDRAMINE PER 50 MG: Performed by: INTERNAL MEDICINE

## 2025-01-31 PROCEDURE — 85025 COMPLETE CBC W/AUTO DIFF WBC: CPT | Performed by: INTERNAL MEDICINE

## 2025-01-31 PROCEDURE — 25010000002 HEPARIN LOCK FLUSH PER 10 UNITS: Performed by: INTERNAL MEDICINE

## 2025-01-31 PROCEDURE — 36415 COLL VENOUS BLD VENIPUNCTURE: CPT | Performed by: INTERNAL MEDICINE

## 2025-01-31 RX ORDER — ACETAMINOPHEN 325 MG/1
650 TABLET ORAL ONCE
Status: CANCELLED | OUTPATIENT
Start: 2025-01-31

## 2025-01-31 RX ORDER — LEVOTHYROXINE SODIUM 150 UG/1
150 TABLET ORAL DAILY
COMMUNITY
Start: 2024-12-23 | End: 2025-01-31

## 2025-01-31 RX ORDER — GABAPENTIN 600 MG/1
600 TABLET ORAL 3 TIMES DAILY
COMMUNITY
Start: 2025-01-22

## 2025-01-31 RX ORDER — ACETAMINOPHEN 325 MG/1
650 TABLET ORAL ONCE
OUTPATIENT
Start: 2025-03-14

## 2025-01-31 RX ORDER — SODIUM CHLORIDE 9 MG/ML
250 INJECTION, SOLUTION INTRAVENOUS ONCE
OUTPATIENT
Start: 2025-04-04

## 2025-01-31 RX ORDER — ACETAMINOPHEN 325 MG/1
650 TABLET ORAL ONCE
OUTPATIENT
Start: 2025-04-04

## 2025-01-31 RX ORDER — POLYETHYLENE GLYCOL 3350 17 G/17G
17 POWDER, FOR SOLUTION ORAL AS NEEDED
COMMUNITY

## 2025-01-31 RX ORDER — HEPARIN SODIUM (PORCINE) LOCK FLUSH IV SOLN 100 UNIT/ML 100 UNIT/ML
500 SOLUTION INTRAVENOUS AS NEEDED
OUTPATIENT
Start: 2025-01-31

## 2025-01-31 RX ORDER — DIPHENHYDRAMINE HYDROCHLORIDE 50 MG/ML
50 INJECTION INTRAMUSCULAR; INTRAVENOUS AS NEEDED
Status: CANCELLED | OUTPATIENT
Start: 2025-01-31

## 2025-01-31 RX ORDER — SODIUM CHLORIDE 9 MG/ML
250 INJECTION, SOLUTION INTRAVENOUS ONCE
Status: DISCONTINUED | OUTPATIENT
Start: 2025-01-31 | End: 2025-01-31 | Stop reason: HOSPADM

## 2025-01-31 RX ORDER — ACETAMINOPHEN 325 MG/1
650 TABLET ORAL ONCE
OUTPATIENT
Start: 2025-02-21

## 2025-01-31 RX ORDER — SODIUM CHLORIDE 9 MG/ML
250 INJECTION, SOLUTION INTRAVENOUS ONCE
OUTPATIENT
Start: 2025-03-14

## 2025-01-31 RX ORDER — DIPHENHYDRAMINE HCL 25 MG
25 CAPSULE ORAL ONCE
Status: COMPLETED | OUTPATIENT
Start: 2025-01-31 | End: 2025-01-31

## 2025-01-31 RX ORDER — FAMOTIDINE 10 MG/ML
20 INJECTION, SOLUTION INTRAVENOUS AS NEEDED
OUTPATIENT
Start: 2025-03-14

## 2025-01-31 RX ORDER — SODIUM CHLORIDE 9 MG/ML
250 INJECTION, SOLUTION INTRAVENOUS ONCE
OUTPATIENT
Start: 2025-02-21

## 2025-01-31 RX ORDER — HYDROCORTISONE SODIUM SUCCINATE 100 MG/2ML
100 INJECTION INTRAMUSCULAR; INTRAVENOUS AS NEEDED
OUTPATIENT
Start: 2025-04-04

## 2025-01-31 RX ORDER — DIPHENHYDRAMINE HCL 25 MG
25 CAPSULE ORAL ONCE
Status: CANCELLED | OUTPATIENT
Start: 2025-01-31

## 2025-01-31 RX ORDER — DIPHENHYDRAMINE HYDROCHLORIDE 50 MG/ML
50 INJECTION INTRAMUSCULAR; INTRAVENOUS AS NEEDED
OUTPATIENT
Start: 2025-03-14

## 2025-01-31 RX ORDER — DIPHENHYDRAMINE HCL 25 MG
25 CAPSULE ORAL ONCE
OUTPATIENT
Start: 2025-04-04

## 2025-01-31 RX ORDER — HYDROCORTISONE SODIUM SUCCINATE 100 MG/2ML
100 INJECTION INTRAMUSCULAR; INTRAVENOUS AS NEEDED
OUTPATIENT
Start: 2025-02-21

## 2025-01-31 RX ORDER — DIPHENHYDRAMINE HYDROCHLORIDE 50 MG/ML
50 INJECTION INTRAMUSCULAR; INTRAVENOUS AS NEEDED
OUTPATIENT
Start: 2025-04-04

## 2025-01-31 RX ORDER — SODIUM CHLORIDE 9 MG/ML
250 INJECTION, SOLUTION INTRAVENOUS ONCE
Status: CANCELLED | OUTPATIENT
Start: 2025-01-31

## 2025-01-31 RX ORDER — ACETAMINOPHEN 325 MG/1
650 TABLET ORAL ONCE
Status: COMPLETED | OUTPATIENT
Start: 2025-01-31 | End: 2025-01-31

## 2025-01-31 RX ORDER — HYDROCORTISONE SODIUM SUCCINATE 100 MG/2ML
100 INJECTION INTRAMUSCULAR; INTRAVENOUS AS NEEDED
OUTPATIENT
Start: 2025-03-14

## 2025-01-31 RX ORDER — DIPHENHYDRAMINE HCL 25 MG
25 CAPSULE ORAL ONCE
OUTPATIENT
Start: 2025-03-14

## 2025-01-31 RX ORDER — DIPHENHYDRAMINE HYDROCHLORIDE 50 MG/ML
50 INJECTION INTRAMUSCULAR; INTRAVENOUS AS NEEDED
OUTPATIENT
Start: 2025-02-21

## 2025-01-31 RX ORDER — FAMOTIDINE 10 MG/ML
20 INJECTION, SOLUTION INTRAVENOUS AS NEEDED
OUTPATIENT
Start: 2025-02-21

## 2025-01-31 RX ORDER — SODIUM CHLORIDE 0.9 % (FLUSH) 0.9 %
10 SYRINGE (ML) INJECTION AS NEEDED
OUTPATIENT
Start: 2025-01-31

## 2025-01-31 RX ORDER — DIPHENHYDRAMINE HCL 25 MG
25 CAPSULE ORAL ONCE
OUTPATIENT
Start: 2025-02-21

## 2025-01-31 RX ORDER — EPINEPHRINE 0.3 MG/.3ML
0.3 INJECTION SUBCUTANEOUS AS NEEDED
COMMUNITY

## 2025-01-31 RX ORDER — HYDROCORTISONE SODIUM SUCCINATE 100 MG/2ML
100 INJECTION INTRAMUSCULAR; INTRAVENOUS AS NEEDED
Status: CANCELLED | OUTPATIENT
Start: 2025-01-31

## 2025-01-31 RX ORDER — LEVOTHYROXINE SODIUM 150 UG/1
150 TABLET ORAL
COMMUNITY

## 2025-01-31 RX ORDER — FAMOTIDINE 10 MG/ML
20 INJECTION, SOLUTION INTRAVENOUS AS NEEDED
Status: CANCELLED | OUTPATIENT
Start: 2025-01-31

## 2025-01-31 RX ORDER — FAMOTIDINE 10 MG/ML
20 INJECTION, SOLUTION INTRAVENOUS AS NEEDED
OUTPATIENT
Start: 2025-04-04

## 2025-01-31 RX ORDER — SODIUM CHLORIDE 0.9 % (FLUSH) 0.9 %
10 SYRINGE (ML) INJECTION AS NEEDED
Status: DISCONTINUED | OUTPATIENT
Start: 2025-01-31 | End: 2025-01-31 | Stop reason: HOSPADM

## 2025-01-31 RX ORDER — HEPARIN SODIUM (PORCINE) LOCK FLUSH IV SOLN 100 UNIT/ML 100 UNIT/ML
500 SOLUTION INTRAVENOUS AS NEEDED
Status: DISCONTINUED | OUTPATIENT
Start: 2025-01-31 | End: 2025-01-31 | Stop reason: HOSPADM

## 2025-01-31 RX ADMIN — Medication 500 UNITS: at 14:07

## 2025-01-31 RX ADMIN — IMMUNE GLOBULIN (HUMAN) 10 G: 10 INJECTION INTRAVENOUS; SUBCUTANEOUS at 12:45

## 2025-01-31 RX ADMIN — Medication 10 ML: at 14:07

## 2025-01-31 RX ADMIN — DIPHENHYDRAMINE HYDROCHLORIDE 25 MG: 25 CAPSULE ORAL at 12:21

## 2025-01-31 RX ADMIN — ACETAMINOPHEN 650 MG: 325 TABLET ORAL at 12:21

## 2025-02-19 NOTE — PROGRESS NOTES
Patient is receiving IVIG, tolerating it without difficulty and continuing to receive benefit.  Continue IVIG at current dosage and schedule.

## 2025-02-21 ENCOUNTER — INFUSION (OUTPATIENT)
Dept: ONCOLOGY | Facility: HOSPITAL | Age: 65
End: 2025-02-21
Payer: MEDICARE

## 2025-02-21 VITALS
HEART RATE: 88 BPM | OXYGEN SATURATION: 96 % | HEIGHT: 66 IN | BODY MASS INDEX: 25.3 KG/M2 | DIASTOLIC BLOOD PRESSURE: 67 MMHG | SYSTOLIC BLOOD PRESSURE: 101 MMHG | WEIGHT: 157.4 LBS | TEMPERATURE: 97.2 F | RESPIRATION RATE: 15 BRPM

## 2025-02-21 DIAGNOSIS — D80.1 HYPOGAMMAGLOBULINEMIA: Primary | ICD-10-CM

## 2025-02-21 DIAGNOSIS — Z45.2 ENCOUNTER FOR ADJUSTMENT OR MANAGEMENT OF VASCULAR ACCESS DEVICE: ICD-10-CM

## 2025-02-21 DIAGNOSIS — I87.8 POOR VENOUS ACCESS: ICD-10-CM

## 2025-02-21 LAB
BASOPHILS # BLD AUTO: 0.13 10*3/MM3 (ref 0–0.2)
BASOPHILS NFR BLD AUTO: 1.3 % (ref 0–1.5)
DEPRECATED RDW RBC AUTO: 44.5 FL (ref 37–54)
EOSINOPHIL # BLD AUTO: 0.64 10*3/MM3 (ref 0–0.4)
EOSINOPHIL NFR BLD AUTO: 6.4 % (ref 0.3–6.2)
ERYTHROCYTE [DISTWIDTH] IN BLOOD BY AUTOMATED COUNT: 13.4 % (ref 12.3–15.4)
HCT VFR BLD AUTO: 31.7 % (ref 34–46.6)
HGB BLD-MCNC: 10.7 G/DL (ref 12–15.9)
IGA1 MFR SER: 241 MG/DL (ref 70–400)
IGG1 SER-MCNC: 720 MG/DL (ref 700–1600)
IGM SERPL-MCNC: 54 MG/DL (ref 40–230)
IMM GRANULOCYTES # BLD AUTO: 0.03 10*3/MM3 (ref 0–0.05)
IMM GRANULOCYTES NFR BLD AUTO: 0.3 % (ref 0–0.5)
LYMPHOCYTES # BLD AUTO: 2.97 10*3/MM3 (ref 0.7–3.1)
LYMPHOCYTES NFR BLD AUTO: 29.7 % (ref 19.6–45.3)
MCH RBC QN AUTO: 30.7 PG (ref 26.6–33)
MCHC RBC AUTO-ENTMCNC: 33.8 G/DL (ref 31.5–35.7)
MCV RBC AUTO: 91.1 FL (ref 79–97)
MONOCYTES # BLD AUTO: 0.56 10*3/MM3 (ref 0.1–0.9)
MONOCYTES NFR BLD AUTO: 5.6 % (ref 5–12)
NEUTROPHILS NFR BLD AUTO: 5.67 10*3/MM3 (ref 1.7–7)
NEUTROPHILS NFR BLD AUTO: 56.7 % (ref 42.7–76)
NRBC BLD AUTO-RTO: 0 /100 WBC (ref 0–0.2)
PLATELET # BLD AUTO: 432 10*3/MM3 (ref 140–450)
PMV BLD AUTO: 10 FL (ref 6–12)
RBC # BLD AUTO: 3.48 10*6/MM3 (ref 3.77–5.28)
WBC NRBC COR # BLD AUTO: 10 10*3/MM3 (ref 3.4–10.8)

## 2025-02-21 PROCEDURE — 96365 THER/PROPH/DIAG IV INF INIT: CPT

## 2025-02-21 PROCEDURE — A9270 NON-COVERED ITEM OR SERVICE: HCPCS | Performed by: INTERNAL MEDICINE

## 2025-02-21 PROCEDURE — 25010000002 IMMUNE GLOBULIN (HUMAN) 10 GM/100ML SOLUTION: Performed by: INTERNAL MEDICINE

## 2025-02-21 PROCEDURE — 63710000001 DIPHENHYDRAMINE PER 50 MG: Performed by: INTERNAL MEDICINE

## 2025-02-21 PROCEDURE — 63710000001 ACETAMINOPHEN 325 MG TABLET: Performed by: INTERNAL MEDICINE

## 2025-02-21 PROCEDURE — 82784 ASSAY IGA/IGD/IGG/IGM EACH: CPT | Performed by: INTERNAL MEDICINE

## 2025-02-21 PROCEDURE — 85025 COMPLETE CBC W/AUTO DIFF WBC: CPT | Performed by: INTERNAL MEDICINE

## 2025-02-21 PROCEDURE — 25010000002 HEPARIN LOCK FLUSH PER 10 UNITS: Performed by: INTERNAL MEDICINE

## 2025-02-21 RX ORDER — ACETAMINOPHEN 325 MG/1
650 TABLET ORAL ONCE
Status: COMPLETED | OUTPATIENT
Start: 2025-02-21 | End: 2025-02-21

## 2025-02-21 RX ORDER — DIPHENHYDRAMINE HCL 25 MG
25 CAPSULE ORAL ONCE
Status: COMPLETED | OUTPATIENT
Start: 2025-02-21 | End: 2025-02-21

## 2025-02-21 RX ORDER — SODIUM CHLORIDE 9 MG/ML
250 INJECTION, SOLUTION INTRAVENOUS ONCE
Status: DISCONTINUED | OUTPATIENT
Start: 2025-02-21 | End: 2025-02-21 | Stop reason: HOSPADM

## 2025-02-21 RX ORDER — HEPARIN SODIUM (PORCINE) LOCK FLUSH IV SOLN 100 UNIT/ML 100 UNIT/ML
500 SOLUTION INTRAVENOUS AS NEEDED
Status: DISCONTINUED | OUTPATIENT
Start: 2025-02-21 | End: 2025-02-21 | Stop reason: HOSPADM

## 2025-02-21 RX ORDER — SODIUM CHLORIDE 0.9 % (FLUSH) 0.9 %
10 SYRINGE (ML) INJECTION AS NEEDED
Status: DISCONTINUED | OUTPATIENT
Start: 2025-02-21 | End: 2025-02-21 | Stop reason: HOSPADM

## 2025-02-21 RX ORDER — HEPARIN SODIUM (PORCINE) LOCK FLUSH IV SOLN 100 UNIT/ML 100 UNIT/ML
500 SOLUTION INTRAVENOUS AS NEEDED
OUTPATIENT
Start: 2025-02-21

## 2025-02-21 RX ORDER — SODIUM CHLORIDE 0.9 % (FLUSH) 0.9 %
10 SYRINGE (ML) INJECTION AS NEEDED
OUTPATIENT
Start: 2025-02-21

## 2025-02-21 RX ADMIN — DIPHENHYDRAMINE HYDROCHLORIDE 25 MG: 25 CAPSULE ORAL at 13:49

## 2025-02-21 RX ADMIN — Medication 10 ML: at 15:33

## 2025-02-21 RX ADMIN — Medication 500 UNITS: at 15:33

## 2025-02-21 RX ADMIN — IMMUNE GLOBULIN (HUMAN) 10 G: 10 INJECTION INTRAVENOUS; SUBCUTANEOUS at 14:10

## 2025-02-21 RX ADMIN — ACETAMINOPHEN 650 MG: 325 TABLET ORAL at 13:49

## 2025-03-14 ENCOUNTER — INFUSION (OUTPATIENT)
Dept: ONCOLOGY | Facility: HOSPITAL | Age: 65
End: 2025-03-14
Payer: MEDICARE

## 2025-03-14 ENCOUNTER — OFFICE VISIT (OUTPATIENT)
Dept: ONCOLOGY | Facility: CLINIC | Age: 65
End: 2025-03-14
Payer: MEDICARE

## 2025-03-14 VITALS — SYSTOLIC BLOOD PRESSURE: 98 MMHG | DIASTOLIC BLOOD PRESSURE: 62 MMHG | HEART RATE: 88 BPM

## 2025-03-14 VITALS
SYSTOLIC BLOOD PRESSURE: 108 MMHG | OXYGEN SATURATION: 96 % | DIASTOLIC BLOOD PRESSURE: 73 MMHG | HEART RATE: 92 BPM | BODY MASS INDEX: 25.63 KG/M2 | RESPIRATION RATE: 16 BRPM | WEIGHT: 159.5 LBS | HEIGHT: 66 IN | TEMPERATURE: 98 F

## 2025-03-14 DIAGNOSIS — D80.1 HYPOGAMMAGLOBULINEMIA: Primary | ICD-10-CM

## 2025-03-14 DIAGNOSIS — I87.8 POOR VENOUS ACCESS: ICD-10-CM

## 2025-03-14 DIAGNOSIS — D64.9 ANEMIA, UNSPECIFIED TYPE: ICD-10-CM

## 2025-03-14 DIAGNOSIS — M06.9 RHEUMATOID ARTHRITIS, INVOLVING UNSPECIFIED SITE, UNSPECIFIED WHETHER RHEUMATOID FACTOR PRESENT: ICD-10-CM

## 2025-03-14 DIAGNOSIS — D80.1 HYPOGAMMAGLOBULINEMIA, ACQUIRED: ICD-10-CM

## 2025-03-14 DIAGNOSIS — Z45.2 ENCOUNTER FOR ADJUSTMENT OR MANAGEMENT OF VASCULAR ACCESS DEVICE: ICD-10-CM

## 2025-03-14 DIAGNOSIS — D80.1 HYPOGAMMAGLOBULINEMIA: ICD-10-CM

## 2025-03-14 DIAGNOSIS — Z79.899 HIGH RISK MEDICATION USE: ICD-10-CM

## 2025-03-14 LAB
ALBUMIN SERPL-MCNC: 3.9 G/DL (ref 3.5–5.2)
ALBUMIN/GLOB SERPL: 1.4 G/DL
ALP SERPL-CCNC: 102 U/L (ref 39–117)
ALT SERPL W P-5'-P-CCNC: 14 U/L (ref 1–33)
ANION GAP SERPL CALCULATED.3IONS-SCNC: 12.2 MMOL/L (ref 5–15)
AST SERPL-CCNC: 20 U/L (ref 1–32)
BASOPHILS # BLD AUTO: 0.1 10*3/MM3 (ref 0–0.2)
BASOPHILS NFR BLD AUTO: 1.4 % (ref 0–1.5)
BILIRUB SERPL-MCNC: 0.2 MG/DL (ref 0–1.2)
BUN SERPL-MCNC: 31 MG/DL (ref 8–23)
BUN/CREAT SERPL: 37.3 (ref 7–25)
CALCIUM SPEC-SCNC: 8.7 MG/DL (ref 8.6–10.5)
CHLORIDE SERPL-SCNC: 98 MMOL/L (ref 98–107)
CO2 SERPL-SCNC: 27.8 MMOL/L (ref 22–29)
CREAT SERPL-MCNC: 0.83 MG/DL (ref 0.57–1)
DEPRECATED RDW RBC AUTO: 44 FL (ref 37–54)
EGFRCR SERPLBLD CKD-EPI 2021: 77.9 ML/MIN/1.73
EOSINOPHIL # BLD AUTO: 0.28 10*3/MM3 (ref 0–0.4)
EOSINOPHIL NFR BLD AUTO: 4.1 % (ref 0.3–6.2)
ERYTHROCYTE [DISTWIDTH] IN BLOOD BY AUTOMATED COUNT: 13.2 % (ref 12.3–15.4)
FERRITIN SERPL-MCNC: 36.7 NG/ML (ref 13–150)
FOLATE SERPL-MCNC: 14.72 NG/ML (ref 4.78–24.2)
GLOBULIN UR ELPH-MCNC: 2.7 GM/DL
GLUCOSE SERPL-MCNC: 103 MG/DL (ref 65–99)
HCT VFR BLD AUTO: 30.9 % (ref 34–46.6)
HGB BLD-MCNC: 10.2 G/DL (ref 12–15.9)
IGA1 MFR SER: 263 MG/DL (ref 70–400)
IGG1 SER-MCNC: 911 MG/DL (ref 700–1600)
IGM SERPL-MCNC: 59 MG/DL (ref 40–230)
IMM GRANULOCYTES # BLD AUTO: 0.01 10*3/MM3 (ref 0–0.05)
IMM GRANULOCYTES NFR BLD AUTO: 0.1 % (ref 0–0.5)
IRON 24H UR-MRATE: 64 MCG/DL (ref 37–145)
IRON SATN MFR SERPL: 16 % (ref 20–50)
LYMPHOCYTES # BLD AUTO: 2.19 10*3/MM3 (ref 0.7–3.1)
LYMPHOCYTES NFR BLD AUTO: 31.7 % (ref 19.6–45.3)
MCH RBC QN AUTO: 30.3 PG (ref 26.6–33)
MCHC RBC AUTO-ENTMCNC: 33 G/DL (ref 31.5–35.7)
MCV RBC AUTO: 91.7 FL (ref 79–97)
MONOCYTES # BLD AUTO: 0.41 10*3/MM3 (ref 0.1–0.9)
MONOCYTES NFR BLD AUTO: 5.9 % (ref 5–12)
NEUTROPHILS NFR BLD AUTO: 3.92 10*3/MM3 (ref 1.7–7)
NEUTROPHILS NFR BLD AUTO: 56.8 % (ref 42.7–76)
NRBC BLD AUTO-RTO: 0 /100 WBC (ref 0–0.2)
PLATELET # BLD AUTO: 480 10*3/MM3 (ref 140–450)
PMV BLD AUTO: 9.3 FL (ref 6–12)
POTASSIUM SERPL-SCNC: 4.2 MMOL/L (ref 3.5–5.2)
PROT SERPL-MCNC: 6.6 G/DL (ref 6–8.5)
RBC # BLD AUTO: 3.37 10*6/MM3 (ref 3.77–5.28)
SODIUM SERPL-SCNC: 138 MMOL/L (ref 136–145)
TIBC SERPL-MCNC: 407 MCG/DL (ref 298–536)
TRANSFERRIN SERPL-MCNC: 273 MG/DL (ref 200–360)
VIT B12 BLD-MCNC: 733 PG/ML (ref 211–946)
WBC NRBC COR # BLD AUTO: 6.91 10*3/MM3 (ref 3.4–10.8)

## 2025-03-14 PROCEDURE — 82607 VITAMIN B-12: CPT | Performed by: NURSE PRACTITIONER

## 2025-03-14 PROCEDURE — 25010000002 IMMUNE GLOBULIN (HUMAN) 10 GM/100ML SOLUTION: Performed by: INTERNAL MEDICINE

## 2025-03-14 PROCEDURE — 80053 COMPREHEN METABOLIC PANEL: CPT | Performed by: NURSE PRACTITIONER

## 2025-03-14 PROCEDURE — 84466 ASSAY OF TRANSFERRIN: CPT | Performed by: NURSE PRACTITIONER

## 2025-03-14 PROCEDURE — 83540 ASSAY OF IRON: CPT | Performed by: NURSE PRACTITIONER

## 2025-03-14 PROCEDURE — 82728 ASSAY OF FERRITIN: CPT | Performed by: NURSE PRACTITIONER

## 2025-03-14 PROCEDURE — 25010000002 HEPARIN LOCK FLUSH PER 10 UNITS: Performed by: INTERNAL MEDICINE

## 2025-03-14 PROCEDURE — 63710000001 ACETAMINOPHEN 325 MG TABLET: Performed by: INTERNAL MEDICINE

## 2025-03-14 PROCEDURE — 82784 ASSAY IGA/IGD/IGG/IGM EACH: CPT | Performed by: INTERNAL MEDICINE

## 2025-03-14 PROCEDURE — 82746 ASSAY OF FOLIC ACID SERUM: CPT | Performed by: NURSE PRACTITIONER

## 2025-03-14 PROCEDURE — 85025 COMPLETE CBC W/AUTO DIFF WBC: CPT | Performed by: INTERNAL MEDICINE

## 2025-03-14 PROCEDURE — A9270 NON-COVERED ITEM OR SERVICE: HCPCS | Performed by: INTERNAL MEDICINE

## 2025-03-14 PROCEDURE — 63710000001 DIPHENHYDRAMINE PER 50 MG: Performed by: INTERNAL MEDICINE

## 2025-03-14 RX ORDER — DIPHENHYDRAMINE HCL 25 MG
25 CAPSULE ORAL ONCE
Status: COMPLETED | OUTPATIENT
Start: 2025-03-14 | End: 2025-03-14

## 2025-03-14 RX ORDER — SODIUM CHLORIDE 0.9 % (FLUSH) 0.9 %
10 SYRINGE (ML) INJECTION AS NEEDED
OUTPATIENT
Start: 2025-03-14

## 2025-03-14 RX ORDER — HEPARIN SODIUM (PORCINE) LOCK FLUSH IV SOLN 100 UNIT/ML 100 UNIT/ML
500 SOLUTION INTRAVENOUS AS NEEDED
OUTPATIENT
Start: 2025-03-14

## 2025-03-14 RX ORDER — ACETAMINOPHEN 325 MG/1
650 TABLET ORAL ONCE
Status: COMPLETED | OUTPATIENT
Start: 2025-03-14 | End: 2025-03-14

## 2025-03-14 RX ORDER — SODIUM CHLORIDE 0.9 % (FLUSH) 0.9 %
10 SYRINGE (ML) INJECTION AS NEEDED
Status: DISCONTINUED | OUTPATIENT
Start: 2025-03-14 | End: 2025-03-14 | Stop reason: HOSPADM

## 2025-03-14 RX ORDER — HEPARIN SODIUM (PORCINE) LOCK FLUSH IV SOLN 100 UNIT/ML 100 UNIT/ML
500 SOLUTION INTRAVENOUS AS NEEDED
Status: DISCONTINUED | OUTPATIENT
Start: 2025-03-14 | End: 2025-03-14 | Stop reason: HOSPADM

## 2025-03-14 RX ORDER — FERROUS SULFATE 325(65) MG
1 TABLET ORAL
Qty: 30 TABLET | Refills: 3 | Status: SHIPPED | OUTPATIENT
Start: 2025-03-14

## 2025-03-14 RX ADMIN — Medication 10 ML: at 13:42

## 2025-03-14 RX ADMIN — IMMUNE GLOBULIN (HUMAN) 10 G: 10 INJECTION INTRAVENOUS; SUBCUTANEOUS at 12:21

## 2025-03-14 RX ADMIN — DIPHENHYDRAMINE HYDROCHLORIDE 25 MG: 25 CAPSULE ORAL at 11:56

## 2025-03-14 RX ADMIN — ACETAMINOPHEN 650 MG: 325 TABLET ORAL at 11:56

## 2025-03-14 RX ADMIN — Medication 500 UNITS: at 13:42

## 2025-03-14 NOTE — PROGRESS NOTES
"REASON FOR FOLLOW UP:    Hypogammaglobulinemia; receiving Gamunex infusions every 2 weeks  2.  Iron deficiency anemia vs anemia of chronic disease.  Her anemia improved significantly with oral iron supplementation.  3.  Aberrant T-cell population with absent CD7  4.  Reactive leukocytosis  5.  Reactive thrombocytosis  6.  Rheumatoid arthritis followed by Dr. Ofelia Tinajero      INTERVAL HISTORY:  Paola Reid is a 64 y.o. female who returns today for follow up.    The patient returns the office today for follow-up and continued IVIG which she continues to receive every 3 weeks.  She reports she has only had 1 bout of bronchitis this year.  She does continue to struggle with arthritis pain.  She was recently seen in the emergency department after a fall and hitting her head.  She reports her primary care physician recently made multiple medication adjustments and did discontinue her oral iron.    PE:    Vitals:    03/14/25 1125   BP: 108/73   Pulse: 92   Resp: 16   Temp: 98 °F (36.7 °C)   TempSrc: Oral   SpO2: 96%   Weight: 72.3 kg (159 lb 8 oz)   Height: 167 cm (65.75\")   PainSc: 6    PainLoc: Generalized             3/14/2025    11:27 AM   Current Status   ECOG score 1     General:  No acute distress, awake, alert and oriented.  Walks with a cane today. Kyphosis present.   Skin:  Warm and dry, no visible rash  HEENT:  Normocephalic/atraumatic.   Chest:  Normal respiratory effort  Extremities:  No visible clubbing, cyanosis, or edema  Neuro/psych:  Grossly nonfocal.  Normal mood and affect.      RECENT LABS:  CBC and Differential (03/14/2025 11:17)   Iron Profile (03/14/2025 11:46)  Ferritin (03/14/2025 11:46)  Comprehensive Metabolic Panel (03/14/2025 11:46)    IMAGING:  None reviewed      ASSESSMENT/PLAN:    *Hypogammaglobulinemia with recurring pulmonary infections.  She is doing much better since starting on Gamunex infusions every 2 weeks.  Her allergist is retiring and she has asked us to take over the " infusions.  Her IgG level improved and Dr. Dominguez modified her IVIG dosing to every 3 weeks.  She is currently receiving 10 g every 3 weeks.    Continue IVIG 10 g every 3 weeks.  She will proceed today, 3/14/2025      *Anemia with somewhat ambiguous iron studies.    3/14/2025 hemoglobin is declined at 10.2.  Notably the patient's PCP recently stopped her ferrous sulfate.  Her ferritin is declined at 36 with iron saturation of 16%.  Will therefore resume ferrous sulfate    *Aberrant population of CD7 negative T cells noted on T and B cell analysis.  There is no evidence of monoclonal B-cell population.  We feel this is a reactive T-cell population and not indicative of an underlying T-cell leukemia or lymphoma.    *Positive TONG and elevated markers of inflammation.  Patient has been evaluated by rheumatology and will be following up with Dr. Ofelia Tinajero.  Currently on Cimzia    *Easy bruising, unclear etiology, monitor    *Recent right knee replacement on 10/16/2024    *Hyponatremia  Sodium 138    *Hypokalemia  Recent hyperkalemia at 5.7 associated with acute kidney injury with an elevation in her creatinine to 1.3  She has stopped her oral potassium supplement  Potassium is 3.9    PLAN  Proceed today with IVIG 10 g which is continued every 3 weeks for hypogammaglobulinemia  Resume ferrous sulfate 325 mg once daily  Continue to follow-up closely with rheumatology, Dr. Tinajero  Continue follow-up every 3 weeks for ongoing IVIG therapy  MD follow-up in June with Dr. Carter    Patient is receiving IVIG, tolerating it without difficulty and continuing to receive benefit.  Continue IVIG at current dosage and schedule.    Jerri Gleason, APRN  03/14/2025

## 2025-04-04 ENCOUNTER — INFUSION (OUTPATIENT)
Dept: ONCOLOGY | Facility: HOSPITAL | Age: 65
End: 2025-04-04
Payer: MEDICARE

## 2025-04-04 VITALS
HEIGHT: 66 IN | OXYGEN SATURATION: 100 % | BODY MASS INDEX: 26.13 KG/M2 | SYSTOLIC BLOOD PRESSURE: 112 MMHG | DIASTOLIC BLOOD PRESSURE: 72 MMHG | TEMPERATURE: 99 F | WEIGHT: 162.6 LBS | HEART RATE: 97 BPM | RESPIRATION RATE: 15 BRPM

## 2025-04-04 DIAGNOSIS — D80.1 HYPOGAMMAGLOBULINEMIA: Primary | ICD-10-CM

## 2025-04-04 DIAGNOSIS — Z45.2 ENCOUNTER FOR ADJUSTMENT OR MANAGEMENT OF VASCULAR ACCESS DEVICE: ICD-10-CM

## 2025-04-04 DIAGNOSIS — I87.8 POOR VENOUS ACCESS: ICD-10-CM

## 2025-04-04 LAB
BASOPHILS # BLD AUTO: 0.13 10*3/MM3 (ref 0–0.2)
BASOPHILS NFR BLD AUTO: 1.4 % (ref 0–1.5)
DEPRECATED RDW RBC AUTO: 43 FL (ref 37–54)
EOSINOPHIL # BLD AUTO: 0.33 10*3/MM3 (ref 0–0.4)
EOSINOPHIL NFR BLD AUTO: 3.6 % (ref 0.3–6.2)
ERYTHROCYTE [DISTWIDTH] IN BLOOD BY AUTOMATED COUNT: 13.2 % (ref 12.3–15.4)
HCT VFR BLD AUTO: 33.5 % (ref 34–46.6)
HGB BLD-MCNC: 11.2 G/DL (ref 12–15.9)
IGA1 MFR SER: 273 MG/DL (ref 70–400)
IGG1 SER-MCNC: 1026 MG/DL (ref 700–1600)
IGM SERPL-MCNC: 65 MG/DL (ref 40–230)
IMM GRANULOCYTES # BLD AUTO: 0.03 10*3/MM3 (ref 0–0.05)
IMM GRANULOCYTES NFR BLD AUTO: 0.3 % (ref 0–0.5)
LYMPHOCYTES # BLD AUTO: 2.26 10*3/MM3 (ref 0.7–3.1)
LYMPHOCYTES NFR BLD AUTO: 24.5 % (ref 19.6–45.3)
MCH RBC QN AUTO: 30.2 PG (ref 26.6–33)
MCHC RBC AUTO-ENTMCNC: 33.4 G/DL (ref 31.5–35.7)
MCV RBC AUTO: 90.3 FL (ref 79–97)
MONOCYTES # BLD AUTO: 0.4 10*3/MM3 (ref 0.1–0.9)
MONOCYTES NFR BLD AUTO: 4.3 % (ref 5–12)
NEUTROPHILS NFR BLD AUTO: 6.06 10*3/MM3 (ref 1.7–7)
NEUTROPHILS NFR BLD AUTO: 65.9 % (ref 42.7–76)
NRBC BLD AUTO-RTO: 0 /100 WBC (ref 0–0.2)
PLATELET # BLD AUTO: 376 10*3/MM3 (ref 140–450)
PMV BLD AUTO: 10.1 FL (ref 6–12)
RBC # BLD AUTO: 3.71 10*6/MM3 (ref 3.77–5.28)
WBC NRBC COR # BLD AUTO: 9.21 10*3/MM3 (ref 3.4–10.8)

## 2025-04-04 PROCEDURE — A9270 NON-COVERED ITEM OR SERVICE: HCPCS | Performed by: INTERNAL MEDICINE

## 2025-04-04 PROCEDURE — 82784 ASSAY IGA/IGD/IGG/IGM EACH: CPT | Performed by: INTERNAL MEDICINE

## 2025-04-04 PROCEDURE — 63710000001 ACETAMINOPHEN 325 MG TABLET: Performed by: INTERNAL MEDICINE

## 2025-04-04 PROCEDURE — 96365 THER/PROPH/DIAG IV INF INIT: CPT

## 2025-04-04 PROCEDURE — 85025 COMPLETE CBC W/AUTO DIFF WBC: CPT | Performed by: INTERNAL MEDICINE

## 2025-04-04 PROCEDURE — 25010000002 IMMUNE GLOBULIN (HUMAN) 10 GM/100ML SOLUTION: Performed by: INTERNAL MEDICINE

## 2025-04-04 PROCEDURE — 25010000002 HEPARIN LOCK FLUSH PER 10 UNITS: Performed by: INTERNAL MEDICINE

## 2025-04-04 PROCEDURE — 63710000001 DIPHENHYDRAMINE PER 50 MG: Performed by: INTERNAL MEDICINE

## 2025-04-04 RX ORDER — DIPHENHYDRAMINE HCL 25 MG
25 CAPSULE ORAL ONCE
Status: COMPLETED | OUTPATIENT
Start: 2025-04-04 | End: 2025-04-04

## 2025-04-04 RX ORDER — HEPARIN SODIUM (PORCINE) LOCK FLUSH IV SOLN 100 UNIT/ML 100 UNIT/ML
500 SOLUTION INTRAVENOUS AS NEEDED
Status: DISCONTINUED | OUTPATIENT
Start: 2025-04-04 | End: 2025-04-04 | Stop reason: HOSPADM

## 2025-04-04 RX ORDER — ACETAMINOPHEN 325 MG/1
650 TABLET ORAL ONCE
Status: COMPLETED | OUTPATIENT
Start: 2025-04-04 | End: 2025-04-04

## 2025-04-04 RX ORDER — HEPARIN SODIUM (PORCINE) LOCK FLUSH IV SOLN 100 UNIT/ML 100 UNIT/ML
500 SOLUTION INTRAVENOUS AS NEEDED
OUTPATIENT
Start: 2025-04-04

## 2025-04-04 RX ORDER — SODIUM CHLORIDE 0.9 % (FLUSH) 0.9 %
10 SYRINGE (ML) INJECTION AS NEEDED
OUTPATIENT
Start: 2025-04-04

## 2025-04-04 RX ORDER — SODIUM CHLORIDE 0.9 % (FLUSH) 0.9 %
10 SYRINGE (ML) INJECTION AS NEEDED
Status: DISCONTINUED | OUTPATIENT
Start: 2025-04-04 | End: 2025-04-04 | Stop reason: HOSPADM

## 2025-04-04 RX ADMIN — ACETAMINOPHEN 650 MG: 325 TABLET ORAL at 12:19

## 2025-04-04 RX ADMIN — IMMUNE GLOBULIN (HUMAN) 10 G: 10 INJECTION INTRAVENOUS; SUBCUTANEOUS at 12:40

## 2025-04-04 RX ADMIN — DIPHENHYDRAMINE HYDROCHLORIDE 25 MG: 25 CAPSULE ORAL at 12:19

## 2025-04-04 RX ADMIN — Medication 500 UNITS: at 14:05

## 2025-04-04 RX ADMIN — Medication 10 ML: at 14:05

## 2025-04-25 ENCOUNTER — INFUSION (OUTPATIENT)
Dept: ONCOLOGY | Facility: HOSPITAL | Age: 65
End: 2025-04-25
Payer: MEDICARE

## 2025-04-25 VITALS
SYSTOLIC BLOOD PRESSURE: 149 MMHG | BODY MASS INDEX: 25.8 KG/M2 | WEIGHT: 158.6 LBS | TEMPERATURE: 97.2 F | HEART RATE: 91 BPM | DIASTOLIC BLOOD PRESSURE: 84 MMHG | OXYGEN SATURATION: 100 % | RESPIRATION RATE: 15 BRPM

## 2025-04-25 DIAGNOSIS — I87.8 POOR VENOUS ACCESS: ICD-10-CM

## 2025-04-25 DIAGNOSIS — Z45.2 ENCOUNTER FOR ADJUSTMENT OR MANAGEMENT OF VASCULAR ACCESS DEVICE: ICD-10-CM

## 2025-04-25 DIAGNOSIS — D80.1 HYPOGAMMAGLOBULINEMIA: Primary | ICD-10-CM

## 2025-04-25 LAB
BASOPHILS # BLD AUTO: 0.08 10*3/MM3 (ref 0–0.2)
BASOPHILS NFR BLD AUTO: 0.5 % (ref 0–1.5)
DEPRECATED RDW RBC AUTO: 43.4 FL (ref 37–54)
EOSINOPHIL # BLD AUTO: 0.18 10*3/MM3 (ref 0–0.4)
EOSINOPHIL NFR BLD AUTO: 1.1 % (ref 0.3–6.2)
ERYTHROCYTE [DISTWIDTH] IN BLOOD BY AUTOMATED COUNT: 13.2 % (ref 12.3–15.4)
HCT VFR BLD AUTO: 37.1 % (ref 34–46.6)
HGB BLD-MCNC: 12.3 G/DL (ref 12–15.9)
IGA1 MFR SER: 254 MG/DL (ref 70–400)
IGG1 SER-MCNC: 922 MG/DL (ref 700–1600)
IGM SERPL-MCNC: 67 MG/DL (ref 40–230)
IMM GRANULOCYTES # BLD AUTO: 0.09 10*3/MM3 (ref 0–0.05)
IMM GRANULOCYTES NFR BLD AUTO: 0.6 % (ref 0–0.5)
LYMPHOCYTES # BLD AUTO: 1.81 10*3/MM3 (ref 0.7–3.1)
LYMPHOCYTES NFR BLD AUTO: 11.5 % (ref 19.6–45.3)
MCH RBC QN AUTO: 30 PG (ref 26.6–33)
MCHC RBC AUTO-ENTMCNC: 33.2 G/DL (ref 31.5–35.7)
MCV RBC AUTO: 90.5 FL (ref 79–97)
MONOCYTES # BLD AUTO: 0.58 10*3/MM3 (ref 0.1–0.9)
MONOCYTES NFR BLD AUTO: 3.7 % (ref 5–12)
NEUTROPHILS NFR BLD AUTO: 13 10*3/MM3 (ref 1.7–7)
NEUTROPHILS NFR BLD AUTO: 82.6 % (ref 42.7–76)
NRBC BLD AUTO-RTO: 0 /100 WBC (ref 0–0.2)
PLATELET # BLD AUTO: 379 10*3/MM3 (ref 140–450)
PMV BLD AUTO: 9.3 FL (ref 6–12)
RBC # BLD AUTO: 4.1 10*6/MM3 (ref 3.77–5.28)
WBC NRBC COR # BLD AUTO: 15.74 10*3/MM3 (ref 3.4–10.8)

## 2025-04-25 PROCEDURE — A9270 NON-COVERED ITEM OR SERVICE: HCPCS | Performed by: NURSE PRACTITIONER

## 2025-04-25 PROCEDURE — 63710000001 ACETAMINOPHEN 325 MG TABLET: Performed by: NURSE PRACTITIONER

## 2025-04-25 PROCEDURE — 85025 COMPLETE CBC W/AUTO DIFF WBC: CPT | Performed by: INTERNAL MEDICINE

## 2025-04-25 PROCEDURE — 25010000002 IMMUNE GLOBULIN (HUMAN) 10 GM/100ML SOLUTION: Performed by: NURSE PRACTITIONER

## 2025-04-25 PROCEDURE — 25010000002 HEPARIN LOCK FLUSH PER 10 UNITS: Performed by: INTERNAL MEDICINE

## 2025-04-25 PROCEDURE — 63710000001 DIPHENHYDRAMINE PER 50 MG: Performed by: NURSE PRACTITIONER

## 2025-04-25 PROCEDURE — 96365 THER/PROPH/DIAG IV INF INIT: CPT

## 2025-04-25 PROCEDURE — 82784 ASSAY IGA/IGD/IGG/IGM EACH: CPT | Performed by: INTERNAL MEDICINE

## 2025-04-25 RX ORDER — DIPHENHYDRAMINE HCL 25 MG
25 CAPSULE ORAL ONCE
Status: COMPLETED | OUTPATIENT
Start: 2025-04-25 | End: 2025-04-25

## 2025-04-25 RX ORDER — ACETAMINOPHEN 325 MG/1
650 TABLET ORAL ONCE
Status: COMPLETED | OUTPATIENT
Start: 2025-04-25 | End: 2025-04-25

## 2025-04-25 RX ORDER — HEPARIN SODIUM (PORCINE) LOCK FLUSH IV SOLN 100 UNIT/ML 100 UNIT/ML
500 SOLUTION INTRAVENOUS AS NEEDED
Status: DISCONTINUED | OUTPATIENT
Start: 2025-04-25 | End: 2025-04-25 | Stop reason: HOSPADM

## 2025-04-25 RX ORDER — SODIUM CHLORIDE 0.9 % (FLUSH) 0.9 %
10 SYRINGE (ML) INJECTION AS NEEDED
Status: DISCONTINUED | OUTPATIENT
Start: 2025-04-25 | End: 2025-04-25 | Stop reason: HOSPADM

## 2025-04-25 RX ORDER — SODIUM CHLORIDE 0.9 % (FLUSH) 0.9 %
10 SYRINGE (ML) INJECTION AS NEEDED
OUTPATIENT
Start: 2025-04-25

## 2025-04-25 RX ORDER — HEPARIN SODIUM (PORCINE) LOCK FLUSH IV SOLN 100 UNIT/ML 100 UNIT/ML
500 SOLUTION INTRAVENOUS AS NEEDED
OUTPATIENT
Start: 2025-04-25

## 2025-04-25 RX ADMIN — Medication 10 ML: at 15:12

## 2025-04-25 RX ADMIN — Medication 500 UNITS: at 15:12

## 2025-04-25 RX ADMIN — ACETAMINOPHEN 650 MG: 325 TABLET ORAL at 13:27

## 2025-04-25 RX ADMIN — IMMUNE GLOBULIN (HUMAN) 10 G: 10 INJECTION INTRAVENOUS; SUBCUTANEOUS at 13:50

## 2025-04-25 RX ADMIN — DIPHENHYDRAMINE HYDROCHLORIDE 25 MG: 25 CAPSULE ORAL at 13:27

## 2025-04-25 NOTE — NURSING NOTE
Pt arrived for IVIG stating that she is recovering from Bronchitis and Pneumonia. Pt stated that she is taking Prednisone daily for the recent infection. Pt tolerated infusion without complications and was discharged in a stable condition.

## 2025-06-13 ENCOUNTER — INFUSION (OUTPATIENT)
Dept: ONCOLOGY | Facility: HOSPITAL | Age: 65
End: 2025-06-13
Payer: MEDICARE

## 2025-06-13 ENCOUNTER — LAB (OUTPATIENT)
Dept: OTHER | Facility: HOSPITAL | Age: 65
End: 2025-06-13
Payer: MEDICARE

## 2025-06-13 ENCOUNTER — OFFICE VISIT (OUTPATIENT)
Dept: ONCOLOGY | Facility: CLINIC | Age: 65
End: 2025-06-13
Payer: MEDICARE

## 2025-06-13 VITALS — SYSTOLIC BLOOD PRESSURE: 126 MMHG | DIASTOLIC BLOOD PRESSURE: 79 MMHG | HEART RATE: 106 BPM

## 2025-06-13 VITALS
TEMPERATURE: 97.9 F | HEIGHT: 66 IN | RESPIRATION RATE: 17 BRPM | BODY MASS INDEX: 25.34 KG/M2 | OXYGEN SATURATION: 97 % | HEART RATE: 80 BPM | WEIGHT: 157.7 LBS | DIASTOLIC BLOOD PRESSURE: 88 MMHG | SYSTOLIC BLOOD PRESSURE: 138 MMHG

## 2025-06-13 DIAGNOSIS — Z45.2 ENCOUNTER FOR ADJUSTMENT OR MANAGEMENT OF VASCULAR ACCESS DEVICE: ICD-10-CM

## 2025-06-13 DIAGNOSIS — D50.9 IRON DEFICIENCY ANEMIA, UNSPECIFIED IRON DEFICIENCY ANEMIA TYPE: ICD-10-CM

## 2025-06-13 DIAGNOSIS — D80.1 HYPOGAMMAGLOBULINEMIA: Primary | ICD-10-CM

## 2025-06-13 DIAGNOSIS — I87.8 POOR VENOUS ACCESS: ICD-10-CM

## 2025-06-13 DIAGNOSIS — D80.1 HYPOGAMMAGLOBULINEMIA: ICD-10-CM

## 2025-06-13 DIAGNOSIS — Z79.899 HIGH RISK MEDICATION USE: ICD-10-CM

## 2025-06-13 LAB
BASOPHILS # BLD AUTO: 0.09 10*3/MM3 (ref 0–0.2)
BASOPHILS NFR BLD AUTO: 1 % (ref 0–1.5)
DEPRECATED RDW RBC AUTO: 45.8 FL (ref 37–54)
EOSINOPHIL # BLD AUTO: 0.11 10*3/MM3 (ref 0–0.4)
EOSINOPHIL NFR BLD AUTO: 1.2 % (ref 0.3–6.2)
ERYTHROCYTE [DISTWIDTH] IN BLOOD BY AUTOMATED COUNT: 13.8 % (ref 12.3–15.4)
HCT VFR BLD AUTO: 38.9 % (ref 34–46.6)
HGB BLD-MCNC: 12.9 G/DL (ref 12–15.9)
IGA1 MFR SER: 284 MG/DL (ref 70–400)
IGG1 SER-MCNC: 851 MG/DL (ref 700–1600)
IGM SERPL-MCNC: 69 MG/DL (ref 40–230)
IMM GRANULOCYTES # BLD AUTO: 0.02 10*3/MM3 (ref 0–0.05)
IMM GRANULOCYTES NFR BLD AUTO: 0.2 % (ref 0–0.5)
LYMPHOCYTES # BLD AUTO: 2.32 10*3/MM3 (ref 0.7–3.1)
LYMPHOCYTES NFR BLD AUTO: 25.2 % (ref 19.6–45.3)
MCH RBC QN AUTO: 30 PG (ref 26.6–33)
MCHC RBC AUTO-ENTMCNC: 33.2 G/DL (ref 31.5–35.7)
MCV RBC AUTO: 90.5 FL (ref 79–97)
MONOCYTES # BLD AUTO: 0.65 10*3/MM3 (ref 0.1–0.9)
MONOCYTES NFR BLD AUTO: 7.1 % (ref 5–12)
NEUTROPHILS NFR BLD AUTO: 6 10*3/MM3 (ref 1.7–7)
NEUTROPHILS NFR BLD AUTO: 65.3 % (ref 42.7–76)
NRBC BLD AUTO-RTO: 0 /100 WBC (ref 0–0.2)
PLATELET # BLD AUTO: 481 10*3/MM3 (ref 140–450)
PMV BLD AUTO: 9.6 FL (ref 6–12)
RBC # BLD AUTO: 4.3 10*6/MM3 (ref 3.77–5.28)
WBC NRBC COR # BLD AUTO: 9.19 10*3/MM3 (ref 3.4–10.8)

## 2025-06-13 PROCEDURE — 25010000002 ALTEPLASE 2 MG RECONSTITUTED SOLUTION: Performed by: NURSE PRACTITIONER

## 2025-06-13 PROCEDURE — A9270 NON-COVERED ITEM OR SERVICE: HCPCS | Performed by: NURSE PRACTITIONER

## 2025-06-13 PROCEDURE — 96365 THER/PROPH/DIAG IV INF INIT: CPT

## 2025-06-13 PROCEDURE — 63710000001 DIPHENHYDRAMINE PER 50 MG: Performed by: NURSE PRACTITIONER

## 2025-06-13 PROCEDURE — 85025 COMPLETE CBC W/AUTO DIFF WBC: CPT | Performed by: INTERNAL MEDICINE

## 2025-06-13 PROCEDURE — 36593 DECLOT VASCULAR DEVICE: CPT

## 2025-06-13 PROCEDURE — 25010000002 HEPARIN LOCK FLUSH PER 10 UNITS: Performed by: INTERNAL MEDICINE

## 2025-06-13 PROCEDURE — 25010000002 IMMUNE GLOBULIN (HUMAN) 10 GM/100ML SOLUTION: Performed by: NURSE PRACTITIONER

## 2025-06-13 PROCEDURE — 63710000001 ACETAMINOPHEN 325 MG TABLET: Performed by: NURSE PRACTITIONER

## 2025-06-13 PROCEDURE — 82784 ASSAY IGA/IGD/IGG/IGM EACH: CPT | Performed by: INTERNAL MEDICINE

## 2025-06-13 RX ORDER — HEPARIN SODIUM (PORCINE) LOCK FLUSH IV SOLN 100 UNIT/ML 100 UNIT/ML
500 SOLUTION INTRAVENOUS AS NEEDED
Status: DISCONTINUED | OUTPATIENT
Start: 2025-06-13 | End: 2025-06-13 | Stop reason: HOSPADM

## 2025-06-13 RX ORDER — DIPHENHYDRAMINE HCL 25 MG
25 CAPSULE ORAL ONCE
Status: CANCELLED | OUTPATIENT
Start: 2025-06-13

## 2025-06-13 RX ORDER — HEPARIN SODIUM (PORCINE) LOCK FLUSH IV SOLN 100 UNIT/ML 100 UNIT/ML
500 SOLUTION INTRAVENOUS AS NEEDED
OUTPATIENT
Start: 2025-06-13

## 2025-06-13 RX ORDER — SODIUM CHLORIDE 0.9 % (FLUSH) 0.9 %
10 SYRINGE (ML) INJECTION AS NEEDED
Status: DISCONTINUED | OUTPATIENT
Start: 2025-06-13 | End: 2025-06-13 | Stop reason: HOSPADM

## 2025-06-13 RX ORDER — SODIUM CHLORIDE 9 MG/ML
250 INJECTION, SOLUTION INTRAVENOUS ONCE
Status: CANCELLED | OUTPATIENT
Start: 2025-06-13

## 2025-06-13 RX ORDER — DIPHENHYDRAMINE HYDROCHLORIDE 50 MG/ML
50 INJECTION, SOLUTION INTRAMUSCULAR; INTRAVENOUS AS NEEDED
Status: CANCELLED | OUTPATIENT
Start: 2025-06-13

## 2025-06-13 RX ORDER — ACETAMINOPHEN 325 MG/1
650 TABLET ORAL ONCE
Status: COMPLETED | OUTPATIENT
Start: 2025-06-13 | End: 2025-06-13

## 2025-06-13 RX ORDER — DIPHENHYDRAMINE HCL 25 MG
25 CAPSULE ORAL ONCE
Status: COMPLETED | OUTPATIENT
Start: 2025-06-13 | End: 2025-06-13

## 2025-06-13 RX ORDER — FAMOTIDINE 10 MG/ML
20 INJECTION, SOLUTION INTRAVENOUS AS NEEDED
Status: CANCELLED | OUTPATIENT
Start: 2025-06-13

## 2025-06-13 RX ORDER — ACETAMINOPHEN 325 MG/1
650 TABLET ORAL ONCE
Status: CANCELLED | OUTPATIENT
Start: 2025-06-13

## 2025-06-13 RX ORDER — HYDROCORTISONE SODIUM SUCCINATE 100 MG/2ML
100 INJECTION INTRAMUSCULAR; INTRAVENOUS AS NEEDED
Status: CANCELLED | OUTPATIENT
Start: 2025-06-13

## 2025-06-13 RX ORDER — SODIUM CHLORIDE 0.9 % (FLUSH) 0.9 %
10 SYRINGE (ML) INJECTION AS NEEDED
OUTPATIENT
Start: 2025-06-13

## 2025-06-13 RX ADMIN — DIPHENHYDRAMINE HYDROCHLORIDE 25 MG: 25 CAPSULE ORAL at 13:47

## 2025-06-13 RX ADMIN — ALTEPLASE: 2.2 INJECTION, POWDER, LYOPHILIZED, FOR SOLUTION INTRAVENOUS at 13:36

## 2025-06-13 RX ADMIN — Medication 500 UNITS: at 16:00

## 2025-06-13 RX ADMIN — Medication 10 ML: at 16:00

## 2025-06-13 RX ADMIN — IMMUNE GLOBULIN (HUMAN) 10 G: 10 INJECTION INTRAVENOUS; SUBCUTANEOUS at 14:39

## 2025-06-13 RX ADMIN — ACETAMINOPHEN 650 MG: 325 TABLET ORAL at 13:47

## 2025-06-13 NOTE — NURSING NOTE
1412: Negative blood return from port. Will continue to monitor.     1435: negative blood return from port.  IV placed in left hand for IVIG infusion. Will continue to monitor for blood return from port    1515: +blood return from port. Brisk return. Wasted 10ml blood/activase.  Pt requested IVIG be finished through her port today. IVIG switched from her left hand to the right port at this time. Informed EVELINE Guthrie.  No need for port dye study that was ordered. Informed scheduling and they are cancelling the dye study.  Pt aware and v/u. Port flushes easily.

## 2025-06-13 NOTE — PROGRESS NOTES
"REASON FOR FOLLOW UP:    Hypogammaglobulinemia; receiving Gamunex infusions every 2 weeks  2.  Iron deficiency anemia vs anemia of chronic disease.  Her anemia improved significantly with oral iron supplementation.  3.  Aberrant T-cell population with absent CD7  4.  Reactive leukocytosis  5.  Reactive thrombocytosis  6.  Rheumatoid arthritis followed by Dr. Ofelia Tinajero      INTERVAL HISTORY:  Paola Reid is a 64 y.o. female who returns today for follow up.    The patient returns the office today in anticipation of IVIG which she typically receives every 3 weeks.  She did miss her appointment 3 weeks ago when she had a bronchitis.  She thankfully recovered this with 1 round of antibiotics.  She continues to follow-up with rheumatology for management of her rheumatoid arthritis.  She reports her symptoms are currently uncontrolled though she is on a dose escalation of a new therapy which she started 4 months ago.  Notably, her Mediport did not function appropriately at rheumatology last week.  We again could not obtain blood return today  PE:    Vitals:    06/13/25 1320   BP: 138/88   Pulse: 80   Resp: 17   Temp: 97.9 °F (36.6 °C)   TempSrc: Temporal   SpO2: 97%   Weight: 71.5 kg (157 lb 11.2 oz)   Height: 167 cm (65.75\")   PainSc: 4    PainLoc: Neck           6/13/2025     1:57 PM   Current Status   ECOG score 1     General:  No acute distress, awake, alert and oriented.  Walks with a cane today. Kyphosis present.   Skin:  Warm and dry, no visible rash  HEENT:  Normocephalic/atraumatic.   Chest:  Normal respiratory effort  Extremities:  No visible clubbing, cyanosis, or edema  Neuro/psych:  Grossly nonfocal.  Normal mood and affect.      RECENT LABS:  CBC and Differential (06/13/2025 13:50)     IMAGING:  None reviewed      ASSESSMENT/PLAN:    *Hypogammaglobulinemia with recurring pulmonary infections.  She is doing much better since starting on Gamunex infusions every 2 weeks.  Her allergist is retiring and " she has asked us to take over the infusions.  Her IgG level improved and Dr. Dominguez modified her IVIG dosing to every 3 weeks.  She is currently receiving 10 g every 3 weeks.    Continue IVIG 10 g every 3 weeks.  She will proceed today, 6/13/2025      *Anemia with somewhat ambiguous iron studies.    3/14/2025 hemoglobin is declined at 10.2.  Notably the patient's PCP recently stopped her ferrous sulfate.  Her ferritin is declined at 36 with iron saturation of 16%.  Will therefore resume ferrous sulfate  Hemoglobin is normal today, 6/13/2025 at 12.9    *Aberrant population of CD7 negative T cells noted on T and B cell analysis.  There is no evidence of monoclonal B-cell population.  We feel this is a reactive T-cell population and not indicative of an underlying T-cell leukemia or lymphoma.    *Positive TONG and elevated markers of inflammation.  Patient has been evaluated by rheumatology and will be following up with Dr. Ofelia Tinajero.  Currently on Cimzia    *Easy bruising, unclear etiology, monitor    *Recent right knee replacement on 10/16/2024    *Mediport  Right chest Mediport malfunctioning today, 6/13/2025, unable to obtain blood return  Proceed with port dye study.  Today's treatment was given via peripheral IV    *Hypokalemia  Recent hyperkalemia at 5.7 associated with acute kidney injury with an elevation in her creatinine to 1.3  She has stopped her oral potassium supplement  Potassium most recently 3.9    PLAN  Proceed today with IVIG 10 g which is continued every 3 weeks for hypogammaglobulinemia  Continue ferrous sulfate 325 mg once daily  Mediport dye study as it is not providing blood return  Continue to follow-up closely with rheumatology, Dr. Tinajero  Continue follow-up every 3 weeks for ongoing IVIG therapy  MD follow-up with Dr. Carter in 12 weeks with ongoing IVIG    Patient is receiving IVIG, tolerating it without difficulty and continuing to receive benefit.  Continue IVIG at current dosage and  schedule.    Addendum: After 90 minutes of Activase instilled Mediport did provide blood return.,  Dye study canceled.    Jerri Gleason, APRN  06/13/2025

## 2025-07-03 ENCOUNTER — DOCUMENTATION (OUTPATIENT)
Dept: ONCOLOGY | Facility: CLINIC | Age: 65
End: 2025-07-03
Payer: MEDICARE

## 2025-07-03 NOTE — PROGRESS NOTES
Patient is receiving IVIG, tolerating it without difficulty and continuing to receive benefit.  Continue IVIG at current dosage and schedule.    GYPSY

## 2025-07-25 ENCOUNTER — TELEPHONE (OUTPATIENT)
Dept: ONCOLOGY | Facility: CLINIC | Age: 65
End: 2025-07-25

## 2025-07-28 ENCOUNTER — OUTSIDE FACILITY SERVICE (OUTPATIENT)
Age: 65
End: 2025-07-28

## 2025-07-28 PROCEDURE — OUTSIDEPOS PR OUTSIDE POS PLACEHOLDER: Performed by: PHYSICAL MEDICINE & REHABILITATION

## 2025-07-29 ENCOUNTER — TELEPHONE (OUTPATIENT)
Dept: ONCOLOGY | Facility: CLINIC | Age: 65
End: 2025-07-29
Payer: MEDICARE

## 2025-07-29 ENCOUNTER — OUTSIDE FACILITY SERVICE (OUTPATIENT)
Age: 65
End: 2025-07-29

## 2025-07-29 PROCEDURE — OUTSIDEPOS PR OUTSIDE POS PLACEHOLDER: Performed by: PHYSICAL MEDICINE & REHABILITATION

## 2025-07-29 NOTE — TELEPHONE ENCOUNTER
Caller: Paola Reid    Relationship: Self    Best call back number: [phone]       Who are you requesting to speak with (clinical staff, provider,  specific staff member): CLINICAL      What was the call regarding: PATIENT INQUIRING ABOUT RECEIVING AN INFUSION FOR IVIG THIS FRIDAY  AT [facility] AS SHE HAS MISSED THE LAST 2

## 2025-07-29 NOTE — TELEPHONE ENCOUNTER
Returned call to patient to let her know someone from scheduling will be calling her to get this set up. That she will need a hospital follow up appt. No answer. Left detailed voicemail with my direct line [phone] should she have any questions. Tigist Burden RN

## 2025-08-07 ENCOUNTER — TELEPHONE (OUTPATIENT)
Dept: ONCOLOGY | Facility: CLINIC | Age: 65
End: 2025-08-07
Payer: MEDICARE

## 2025-08-08 ENCOUNTER — OFFICE VISIT (OUTPATIENT)
Dept: ONCOLOGY | Facility: CLINIC | Age: 65
End: 2025-08-08
Payer: MEDICARE

## 2025-08-08 ENCOUNTER — INFUSION (OUTPATIENT)
Dept: ONCOLOGY | Facility: HOSPITAL | Age: 65
End: 2025-08-08
Payer: MEDICARE

## 2025-08-08 VITALS
DIASTOLIC BLOOD PRESSURE: 79 MMHG | HEIGHT: 66 IN | HEART RATE: 75 BPM | TEMPERATURE: 98 F | SYSTOLIC BLOOD PRESSURE: 129 MMHG | BODY MASS INDEX: 26.93 KG/M2 | OXYGEN SATURATION: 98 % | WEIGHT: 167.6 LBS | RESPIRATION RATE: 16 BRPM

## 2025-08-08 DIAGNOSIS — D80.1 HYPOGAMMAGLOBULINEMIA, ACQUIRED: Primary | ICD-10-CM

## 2025-08-08 DIAGNOSIS — Z45.2 ENCOUNTER FOR ADJUSTMENT OR MANAGEMENT OF VASCULAR ACCESS DEVICE: ICD-10-CM

## 2025-08-08 DIAGNOSIS — D80.1 HYPOGAMMAGLOBULINEMIA: ICD-10-CM

## 2025-08-08 DIAGNOSIS — D64.9 ANEMIA, UNSPECIFIED TYPE: ICD-10-CM

## 2025-08-08 DIAGNOSIS — D80.1 HYPOGAMMAGLOBULINEMIA: Primary | ICD-10-CM

## 2025-08-08 DIAGNOSIS — I87.8 POOR VENOUS ACCESS: ICD-10-CM

## 2025-08-08 LAB
BASOPHILS # BLD AUTO: 0.06 10*3/MM3 (ref 0–0.2)
BASOPHILS NFR BLD AUTO: 0.4 % (ref 0–1.5)
DEPRECATED RDW RBC AUTO: 49.4 FL (ref 37–54)
EOSINOPHIL # BLD AUTO: 0.42 10*3/MM3 (ref 0–0.4)
EOSINOPHIL NFR BLD AUTO: 2.9 % (ref 0.3–6.2)
ERYTHROCYTE [DISTWIDTH] IN BLOOD BY AUTOMATED COUNT: 14.8 % (ref 12.3–15.4)
FERRITIN SERPL-MCNC: 46.6 NG/ML (ref 13–150)
HCT VFR BLD AUTO: 28.6 % (ref 34–46.6)
HGB BLD-MCNC: 9 G/DL (ref 12–15.9)
IGA1 MFR SER: 191 MG/DL (ref 70–400)
IGG1 SER-MCNC: 669 MG/DL (ref 700–1600)
IGM SERPL-MCNC: 46 MG/DL (ref 40–230)
IMM GRANULOCYTES # BLD AUTO: 0.06 10*3/MM3 (ref 0–0.05)
IMM GRANULOCYTES NFR BLD AUTO: 0.4 % (ref 0–0.5)
IRON 24H UR-MRATE: 20 MCG/DL (ref 37–145)
IRON SATN MFR SERPL: 6 % (ref 20–50)
LYMPHOCYTES # BLD AUTO: 1.75 10*3/MM3 (ref 0.7–3.1)
LYMPHOCYTES NFR BLD AUTO: 12.1 % (ref 19.6–45.3)
MCH RBC QN AUTO: 29.8 PG (ref 26.6–33)
MCHC RBC AUTO-ENTMCNC: 31.5 G/DL (ref 31.5–35.7)
MCV RBC AUTO: 94.7 FL (ref 79–97)
MONOCYTES # BLD AUTO: 0.35 10*3/MM3 (ref 0.1–0.9)
MONOCYTES NFR BLD AUTO: 2.4 % (ref 5–12)
NEUTROPHILS NFR BLD AUTO: 11.87 10*3/MM3 (ref 1.7–7)
NEUTROPHILS NFR BLD AUTO: 81.8 % (ref 42.7–76)
NRBC BLD AUTO-RTO: 0 /100 WBC (ref 0–0.2)
PLATELET # BLD AUTO: 334 10*3/MM3 (ref 140–450)
PMV BLD AUTO: 8.6 FL (ref 6–12)
RBC # BLD AUTO: 3.02 10*6/MM3 (ref 3.77–5.28)
TIBC SERPL-MCNC: 329 MCG/DL (ref 298–536)
TRANSFERRIN SERPL-MCNC: 221 MG/DL (ref 200–360)
WBC NRBC COR # BLD AUTO: 14.51 10*3/MM3 (ref 3.4–10.8)

## 2025-08-08 PROCEDURE — 85025 COMPLETE CBC W/AUTO DIFF WBC: CPT

## 2025-08-08 PROCEDURE — 25010000002 IMMUNE GLOBULIN (HUMAN) 10 GM/100ML SOLUTION

## 2025-08-08 PROCEDURE — 82728 ASSAY OF FERRITIN: CPT

## 2025-08-08 PROCEDURE — 63710000001 ACETAMINOPHEN 325 MG TABLET

## 2025-08-08 PROCEDURE — 96365 THER/PROPH/DIAG IV INF INIT: CPT

## 2025-08-08 PROCEDURE — 63710000001 DIPHENHYDRAMINE PER 50 MG

## 2025-08-08 PROCEDURE — 36415 COLL VENOUS BLD VENIPUNCTURE: CPT

## 2025-08-08 PROCEDURE — A9270 NON-COVERED ITEM OR SERVICE: HCPCS

## 2025-08-08 PROCEDURE — 84466 ASSAY OF TRANSFERRIN: CPT

## 2025-08-08 PROCEDURE — 82784 ASSAY IGA/IGD/IGG/IGM EACH: CPT | Performed by: INTERNAL MEDICINE

## 2025-08-08 PROCEDURE — 25010000002 HEPARIN LOCK FLUSH PER 10 UNITS: Performed by: INTERNAL MEDICINE

## 2025-08-08 PROCEDURE — 83540 ASSAY OF IRON: CPT

## 2025-08-08 RX ORDER — FERROUS SULFATE 325(65) MG
1 TABLET ORAL
Qty: 30 TABLET | Refills: 3 | Status: SHIPPED | OUTPATIENT
Start: 2025-08-08

## 2025-08-08 RX ORDER — ACETAMINOPHEN 325 MG/1
650 TABLET ORAL ONCE
Status: COMPLETED | OUTPATIENT
Start: 2025-08-08 | End: 2025-08-08

## 2025-08-08 RX ORDER — SODIUM CHLORIDE 0.9 % (FLUSH) 0.9 %
10 SYRINGE (ML) INJECTION AS NEEDED
OUTPATIENT
Start: 2025-08-08

## 2025-08-08 RX ORDER — HEPARIN SODIUM (PORCINE) LOCK FLUSH IV SOLN 100 UNIT/ML 100 UNIT/ML
500 SOLUTION INTRAVENOUS AS NEEDED
OUTPATIENT
Start: 2025-08-08

## 2025-08-08 RX ORDER — DIPHENHYDRAMINE HCL 25 MG
25 CAPSULE ORAL ONCE
Status: COMPLETED | OUTPATIENT
Start: 2025-08-08 | End: 2025-08-08

## 2025-08-08 RX ORDER — HEPARIN SODIUM (PORCINE) LOCK FLUSH IV SOLN 100 UNIT/ML 100 UNIT/ML
500 SOLUTION INTRAVENOUS AS NEEDED
Status: DISCONTINUED | OUTPATIENT
Start: 2025-08-08 | End: 2025-08-08 | Stop reason: HOSPADM

## 2025-08-08 RX ORDER — SODIUM CHLORIDE 0.9 % (FLUSH) 0.9 %
10 SYRINGE (ML) INJECTION AS NEEDED
Status: DISCONTINUED | OUTPATIENT
Start: 2025-08-08 | End: 2025-08-08 | Stop reason: HOSPADM

## 2025-08-08 RX ADMIN — Medication 10 ML: at 11:47

## 2025-08-08 RX ADMIN — IMMUNE GLOBULIN (HUMAN) 10 G: 10 INJECTION INTRAVENOUS; SUBCUTANEOUS at 10:34

## 2025-08-08 RX ADMIN — ACETAMINOPHEN 650 MG: 325 TABLET ORAL at 10:15

## 2025-08-08 RX ADMIN — DIPHENHYDRAMINE HYDROCHLORIDE 25 MG: 25 CAPSULE ORAL at 10:15

## 2025-08-08 RX ADMIN — Medication 500 UNITS: at 11:47

## 2025-08-29 ENCOUNTER — INFUSION (OUTPATIENT)
Dept: ONCOLOGY | Facility: HOSPITAL | Age: 65
End: 2025-08-29
Payer: MEDICARE

## 2025-08-29 ENCOUNTER — DOCUMENTATION (OUTPATIENT)
Dept: ONCOLOGY | Facility: CLINIC | Age: 65
End: 2025-08-29
Payer: MEDICARE

## 2025-08-29 VITALS
DIASTOLIC BLOOD PRESSURE: 66 MMHG | HEIGHT: 66 IN | SYSTOLIC BLOOD PRESSURE: 101 MMHG | TEMPERATURE: 97.1 F | BODY MASS INDEX: 25.46 KG/M2 | OXYGEN SATURATION: 99 % | WEIGHT: 158.4 LBS | HEART RATE: 83 BPM | RESPIRATION RATE: 15 BRPM

## 2025-08-29 DIAGNOSIS — Z45.2 ENCOUNTER FOR ADJUSTMENT OR MANAGEMENT OF VASCULAR ACCESS DEVICE: ICD-10-CM

## 2025-08-29 DIAGNOSIS — I87.8 POOR VENOUS ACCESS: ICD-10-CM

## 2025-08-29 DIAGNOSIS — D80.1 HYPOGAMMAGLOBULINEMIA: Primary | ICD-10-CM

## 2025-08-29 LAB
BASOPHILS # BLD AUTO: 0.1 10*3/MM3 (ref 0–0.2)
BASOPHILS NFR BLD AUTO: 1.5 % (ref 0–1.5)
DEPRECATED RDW RBC AUTO: 45.6 FL (ref 37–54)
EOSINOPHIL # BLD AUTO: 0.35 10*3/MM3 (ref 0–0.4)
EOSINOPHIL NFR BLD AUTO: 5.3 % (ref 0.3–6.2)
ERYTHROCYTE [DISTWIDTH] IN BLOOD BY AUTOMATED COUNT: 13.8 % (ref 12.3–15.4)
HCT VFR BLD AUTO: 31.9 % (ref 34–46.6)
HGB BLD-MCNC: 10.5 G/DL (ref 12–15.9)
IGA1 MFR SER: 231 MG/DL (ref 70–400)
IGG1 SER-MCNC: 874 MG/DL (ref 700–1600)
IGM SERPL-MCNC: 56 MG/DL (ref 40–230)
IMM GRANULOCYTES # BLD AUTO: 0.01 10*3/MM3 (ref 0–0.05)
IMM GRANULOCYTES NFR BLD AUTO: 0.2 % (ref 0–0.5)
LYMPHOCYTES # BLD AUTO: 2.2 10*3/MM3 (ref 0.7–3.1)
LYMPHOCYTES NFR BLD AUTO: 33.2 % (ref 19.6–45.3)
MCH RBC QN AUTO: 29.8 PG (ref 26.6–33)
MCHC RBC AUTO-ENTMCNC: 32.9 G/DL (ref 31.5–35.7)
MCV RBC AUTO: 90.6 FL (ref 79–97)
MONOCYTES # BLD AUTO: 0.35 10*3/MM3 (ref 0.1–0.9)
MONOCYTES NFR BLD AUTO: 5.3 % (ref 5–12)
NEUTROPHILS NFR BLD AUTO: 3.61 10*3/MM3 (ref 1.7–7)
NEUTROPHILS NFR BLD AUTO: 54.5 % (ref 42.7–76)
NRBC BLD AUTO-RTO: 0 /100 WBC (ref 0–0.2)
PLATELET # BLD AUTO: 360 10*3/MM3 (ref 140–450)
PMV BLD AUTO: 9.7 FL (ref 6–12)
RBC # BLD AUTO: 3.52 10*6/MM3 (ref 3.77–5.28)
WBC NRBC COR # BLD AUTO: 6.62 10*3/MM3 (ref 3.4–10.8)

## 2025-08-29 PROCEDURE — 63710000001 ACETAMINOPHEN 325 MG TABLET: Performed by: NURSE PRACTITIONER

## 2025-08-29 PROCEDURE — 82784 ASSAY IGA/IGD/IGG/IGM EACH: CPT | Performed by: INTERNAL MEDICINE

## 2025-08-29 PROCEDURE — 96365 THER/PROPH/DIAG IV INF INIT: CPT

## 2025-08-29 PROCEDURE — A9270 NON-COVERED ITEM OR SERVICE: HCPCS | Performed by: NURSE PRACTITIONER

## 2025-08-29 PROCEDURE — 63710000001 DIPHENHYDRAMINE PER 50 MG: Performed by: NURSE PRACTITIONER

## 2025-08-29 PROCEDURE — 85025 COMPLETE CBC W/AUTO DIFF WBC: CPT | Performed by: INTERNAL MEDICINE

## 2025-08-29 PROCEDURE — 25010000002 HEPARIN LOCK FLUSH PER 10 UNITS: Performed by: INTERNAL MEDICINE

## 2025-08-29 PROCEDURE — 25010000002 IMMUNE GLOBULIN (HUMAN) 10 GM/100ML SOLUTION: Performed by: NURSE PRACTITIONER

## 2025-08-29 RX ORDER — DIPHENHYDRAMINE HCL 25 MG
25 CAPSULE ORAL ONCE
Status: COMPLETED | OUTPATIENT
Start: 2025-08-29 | End: 2025-08-29

## 2025-08-29 RX ORDER — ACETAMINOPHEN 325 MG/1
650 TABLET ORAL ONCE
Status: COMPLETED | OUTPATIENT
Start: 2025-08-29 | End: 2025-08-29

## 2025-08-29 RX ORDER — HEPARIN SODIUM (PORCINE) LOCK FLUSH IV SOLN 100 UNIT/ML 100 UNIT/ML
500 SOLUTION INTRAVENOUS AS NEEDED
OUTPATIENT
Start: 2025-08-29

## 2025-08-29 RX ORDER — SODIUM CHLORIDE 0.9 % (FLUSH) 0.9 %
10 SYRINGE (ML) INJECTION AS NEEDED
Status: DISCONTINUED | OUTPATIENT
Start: 2025-08-29 | End: 2025-08-29 | Stop reason: HOSPADM

## 2025-08-29 RX ORDER — SODIUM CHLORIDE 9 MG/ML
250 INJECTION, SOLUTION INTRAVENOUS ONCE
Status: DISCONTINUED | OUTPATIENT
Start: 2025-08-29 | End: 2025-08-29 | Stop reason: HOSPADM

## 2025-08-29 RX ORDER — HEPARIN SODIUM (PORCINE) LOCK FLUSH IV SOLN 100 UNIT/ML 100 UNIT/ML
500 SOLUTION INTRAVENOUS AS NEEDED
Status: DISCONTINUED | OUTPATIENT
Start: 2025-08-29 | End: 2025-08-29 | Stop reason: HOSPADM

## 2025-08-29 RX ORDER — SODIUM CHLORIDE 0.9 % (FLUSH) 0.9 %
10 SYRINGE (ML) INJECTION AS NEEDED
OUTPATIENT
Start: 2025-08-29

## 2025-08-29 RX ADMIN — ACETAMINOPHEN 650 MG: 325 TABLET ORAL at 12:30

## 2025-08-29 RX ADMIN — Medication 10 ML: at 14:06

## 2025-08-29 RX ADMIN — DIPHENHYDRAMINE HYDROCHLORIDE 25 MG: 25 CAPSULE ORAL at 12:30

## 2025-08-29 RX ADMIN — Medication 500 UNITS: at 14:07

## 2025-08-29 RX ADMIN — IMMUNE GLOBULIN (HUMAN) 10 G: 10 INJECTION INTRAVENOUS; SUBCUTANEOUS at 12:47
